# Patient Record
Sex: FEMALE | Race: BLACK OR AFRICAN AMERICAN | NOT HISPANIC OR LATINO | Employment: FULL TIME | URBAN - METROPOLITAN AREA
[De-identification: names, ages, dates, MRNs, and addresses within clinical notes are randomized per-mention and may not be internally consistent; named-entity substitution may affect disease eponyms.]

---

## 2017-02-08 ENCOUNTER — TELEPHONE (OUTPATIENT)
Dept: FAMILY MEDICINE | Facility: CLINIC | Age: 37
End: 2017-02-08

## 2017-02-08 NOTE — TELEPHONE ENCOUNTER
LH  Seeing 11 month old daughter on 2/16/17 at 11:30, Deborah Huang  Ok to squeeze mother in too?  Please advise,  Elizabeth Maguire, RN

## 2017-02-08 NOTE — TELEPHONE ENCOUNTER
Reason for Call:  Other appointment    Detailed comments: Pt Deborah Campbell will see NP Raj at 11:30 for a 30min appt for travel  [place], pts mother also needs shots but there are not avail appts before their departure   Date. Can the mother come in to be seen with the daughter in the 30 minute long appointment   On 2/9/2017    Phone Number Patient can be reached at: [phone]    Best Time: anytime, please call as soon as possible in the morning    Can we leave a detailed message on this number? YES    Call taken on 2/8/2017 at 9:47 AM by Shanti Kimbrough

## 2017-02-09 NOTE — TELEPHONE ENCOUNTER
Spoke with , they were going to go to a clinic in [place] to be seen sooner.  He needs to confirm the appointment there and will call back to cancel the appointment that they have here on 2/16/17  Elizabeth Maguire RN

## 2017-02-09 NOTE — TELEPHONE ENCOUNTER
I can try to squeeze her in but it may run into our meeting a bit.   Thanks  Loly Anthony CNP (Lori)

## 2017-02-15 NOTE — TELEPHONE ENCOUNTER
Spoke with , requested that we cancel the appointment for his daughter with Mayra Anthony 2/16/17at 11:30 am.  Elizabeth Maguire RN

## 2017-05-02 ENCOUNTER — PRENATAL OFFICE VISIT (OUTPATIENT)
Dept: NURSING | Facility: CLINIC | Age: 37
End: 2017-05-02
Payer: COMMERCIAL

## 2017-05-02 VITALS
HEIGHT: 60 IN | WEIGHT: 102.5 LBS | BODY MASS INDEX: 20.12 KG/M2 | DIASTOLIC BLOOD PRESSURE: 70 MMHG | SYSTOLIC BLOOD PRESSURE: 108 MMHG | HEART RATE: 94 BPM | TEMPERATURE: 98 F

## 2017-05-02 DIAGNOSIS — Z34.90 SUPERVISION OF NORMAL PREGNANCY: Primary | ICD-10-CM

## 2017-05-02 PROBLEM — Z23 NEED FOR TDAP VACCINATION: Status: ACTIVE | Noted: 2017-05-02

## 2017-05-02 LAB
ABO + RH BLD: NORMAL
ABO + RH BLD: NORMAL
ALBUMIN UR-MCNC: NEGATIVE MG/DL
APPEARANCE UR: CLEAR
BILIRUB UR QL STRIP: NEGATIVE
BLD GP AB SCN SERPL QL: NORMAL
BLOOD BANK CMNT PATIENT-IMP: NORMAL
COLOR UR AUTO: YELLOW
ERYTHROCYTE [DISTWIDTH] IN BLOOD BY AUTOMATED COUNT: 13.4 % (ref 10–15)
GLUCOSE UR STRIP-MCNC: NEGATIVE MG/DL
HCT VFR BLD AUTO: 36.5 % (ref 35–47)
HGB BLD-MCNC: 12.1 G/DL (ref 11.7–15.7)
HGB UR QL STRIP: NEGATIVE
KETONES UR STRIP-MCNC: NEGATIVE MG/DL
LEUKOCYTE ESTERASE UR QL STRIP: NEGATIVE
MCH RBC QN AUTO: 30.1 PG (ref 26.5–33)
MCHC RBC AUTO-ENTMCNC: 33.2 G/DL (ref 31.5–36.5)
MCV RBC AUTO: 91 FL (ref 78–100)
NITRATE UR QL: NEGATIVE
PH UR STRIP: 7 PH (ref 5–7)
PLATELET # BLD AUTO: 240 10E9/L (ref 150–450)
RBC # BLD AUTO: 4.02 10E12/L (ref 3.8–5.2)
SP GR UR STRIP: 1.02 (ref 1–1.03)
SPECIMEN EXP DATE BLD: NORMAL
URN SPEC COLLECT METH UR: NORMAL
UROBILINOGEN UR STRIP-ACNC: 0.2 EU/DL (ref 0.2–1)
WBC # BLD AUTO: 9.3 10E9/L (ref 4–11)

## 2017-05-02 PROCEDURE — 85027 COMPLETE CBC AUTOMATED: CPT | Performed by: OBSTETRICS & GYNECOLOGY

## 2017-05-02 PROCEDURE — 81003 URINALYSIS AUTO W/O SCOPE: CPT | Performed by: OBSTETRICS & GYNECOLOGY

## 2017-05-02 PROCEDURE — 86780 TREPONEMA PALLIDUM: CPT | Performed by: OBSTETRICS & GYNECOLOGY

## 2017-05-02 PROCEDURE — 86850 RBC ANTIBODY SCREEN: CPT | Performed by: OBSTETRICS & GYNECOLOGY

## 2017-05-02 PROCEDURE — 87389 HIV-1 AG W/HIV-1&-2 AB AG IA: CPT | Performed by: OBSTETRICS & GYNECOLOGY

## 2017-05-02 PROCEDURE — 99207 ZZC NO CHARGE NURSE ONLY: CPT

## 2017-05-02 PROCEDURE — 86901 BLOOD TYPING SEROLOGIC RH(D): CPT | Performed by: OBSTETRICS & GYNECOLOGY

## 2017-05-02 PROCEDURE — 86900 BLOOD TYPING SEROLOGIC ABO: CPT | Performed by: OBSTETRICS & GYNECOLOGY

## 2017-05-02 PROCEDURE — 86762 RUBELLA ANTIBODY: CPT | Performed by: OBSTETRICS & GYNECOLOGY

## 2017-05-02 PROCEDURE — 87340 HEPATITIS B SURFACE AG IA: CPT | Performed by: OBSTETRICS & GYNECOLOGY

## 2017-05-02 PROCEDURE — 87086 URINE CULTURE/COLONY COUNT: CPT | Performed by: OBSTETRICS & GYNECOLOGY

## 2017-05-02 PROCEDURE — 36415 COLL VENOUS BLD VENIPUNCTURE: CPT | Performed by: OBSTETRICS & GYNECOLOGY

## 2017-05-02 RX ORDER — PRENATAL VIT/IRON FUM/FOLIC AC 27MG-0.8MG
1 TABLET ORAL DAILY
COMMUNITY
End: 2017-05-18

## 2017-05-02 NOTE — MR AVS SNAPSHOT
After Visit Summary   5/2/2017    Ramona Hill    MRN: [id number]           Patient Information     Date Of Birth          1980        Visit Information        Provider Department      5/2/2017 12:45 PM RD OB NURSE EDUCATION [facility]        Today's Diagnoses     Supervision of normal pregnancy    -  1       Follow-ups after your visit        Follow-up notes from your care team     Return in about 6 weeks (around 6/13/2017).      Your next 10 appointments already scheduled     Jun 13, 2017  3:30 PM CDT   New Prenatal with Zo Fox MD   [facility] ([facility])    [address] [place] [id number] [phone]              Who to contact     If you have questions or need follow up information about today's clinic visit or your schedule please contact [facility] directly at [phone].  Normal or non-critical lab and imaging results will be communicated to you by MyChart, letter or phone within 4 business days after the clinic has received the results. If you do not hear from us within 7 days, please contact the clinic through [employer] or phone. If you have a critical or abnormal lab result, we will notify you by phone as soon as possible.  Submit refill requests through [employer] or call your pharmacy and they will forward the refill request to us. Please allow 3 business days for your refill to be completed.          Additional Information About Your Visit        MyChart Information     [employer] gives you secure access to your electronic health record. If you see a primary care provider, you can also send messages to your care team and make appointments. If you have questions, please call your primary care clinic.  If you do not have a primary care provider, please call [phone] and they will assist you.        Care EveryWhere ID     This is your Care EveryWhere ID. This could be used by  "other organizations to access your [place] medical records  [id number]        Your Vitals Were     Pulse Temperature Height Last Period Breastfeeding? BMI (Body Mass Index)    94 98  F (36.7  C) 5' 0.05\" (1.525 m) 03/22/2017 No 19.98 kg/m2       Blood Pressure from Last 3 Encounters:   05/02/17 108/70   08/16/16 112/66   07/27/16 94/62    Weight from Last 3 Encounters:   05/02/17 102 lb 8 oz (46.5 kg)   08/16/16 106 lb 14.4 oz (48.5 kg)   07/27/16 108 lb 12.8 oz (49.4 kg)              We Performed the Following     ABO/RH Type and Screen     Anti Treponema     CBC with Platelets     Hepatitis B surface antigen     HIV Antigen Antibody Combo     Rubella Antibody IgG Quantitative     UA without Microscopic     Urine Culture Aerobic Bacterial        Primary Care Provider Office Phone # Fax #    Annelise Figueroa [phone]       XXX RESIGNED  [address] [facility] [address]  [facility] [address]        Thank you!     Thank you for choosing [facility]  for your care. Our goal is always to provide you with excellent care. Hearing back from our patients is one way we can continue to improve our services. Please take a few minutes to complete the written survey that you may receive in the mail after your visit with us. Thank you!             Your Updated Medication List - Protect others around you: Learn how to safely use, store and throw away your medicines at www.disposemymeds.org.          This list is accurate as of: 5/2/17  1:01 PM.  Always use your most recent med list.                   Brand Name Dispense Instructions for use    prenatal multivitamin  plus iron 27-0.8 MG Tabs per tablet      Take 1 tablet by mouth daily         "

## 2017-05-02 NOTE — PROGRESS NOTES
Patient presents for new ob teaching and labs, second pregnancy, history of C section. Declined first trimester screening. Handouts reviewed and given. Has appointment with Dr Fox 6/13/17      Caffeine intake/servings daily - 0  Calcium intake/servings daily - 3  Exercise 0  times weekly - describe ; walks  Sunscreen used - No  Seatbelts used - Yes  Guns stored in the home - No  Self Breast Exam - No  Pap test up to date -  No  Eye exam up to date -  No  Dental exam up to date -  No  DEXA scan up to date -  No  Flex Sig/Colonoscopy up to date -  No  Mammography up to date -  No  Immunizations reviewed and up to date - Yes  Abuse: Current or Past (Physical, Sexual or Emotional) - No  Do you feel safe in your environment - Yes  Do you cope well with stress - Yes  Do you suffer from insomnia - No    Prenatal OB Questionnaire  Past Medical History  Diabetes   No  Hypertension   No  Heart Disease, mitral valve prolapse, or rheumatic fever?   No  An autoimmune disorder such as Lupus or Rheumatoid Arthritis?   No  Kidney Disease or Urinary Tract Infection?   No  Epilepsy, seizures or spells?   No  Migraine headaches?   No  A stroke or loss of function or sensation?   No  Any other neurological problems?   No  Have you ever been treated for depression?  No  Are you having problems with crying spells or loss of self-esteem?   No  Have you ever required psychiatric care?   No  Have you ever hepatitis, liver disease or jaundice?   No  Have you ever been treated for blood clots in your veins, deep venous thrombosis, inflammation in the veins, thrombosis, phlebitis, pulmonary embolism or varicosities?   No  Have you had excessive bleeding after surgery or dental work?   No  Do you bleed more than other women after a cut or scratch?   No  Do you have a history of anemia?   No  Have you ever been treated for thyroid problems or taken thyroid medication?  No  Do you have any other endocrine problems?  No  Have you ever  been in a major accident or suffered serious trauma?   No  Within the last year, has anyone hit slapped, kicked or otherwise hurt you?  No  In the last year, has anyone forced you to have sex when you didn't want to?  No  Have you ever had a blood transfusion?   No  Would you refuse a blood transfusion if a doctor judged it to be medically necessary?   No  If you answered yes, would you rather die than have a blood transfusion?   No  If you answered yes, is this for Moravian reasons?   No  Does anyone in your home smoke?   No  Do you use tobacco products?  No  Do you drink beer, wine, hard liquor?  No  Do you use any of the following: marijuana, speed, cocaine, heroine, hallucinogens, or other drugs?  No  Is your blood type Rh negative?   No  Have you ever had abnormal antibodies in your blood?   No  Have you ever had asthma?   No  Have you ever had tuberculosis?   No  Do you have any allergies to drugs or over-the-counter medications?   No    Allergies as of 5/2/2017:    Allergies as of 05/02/2017     (No Known Allergies)       Do you have any breast problems?   No  Have you ever ?   Yes  Have you had any gynecological surgical procedures such as cervical conization, a LEEP procedure, laser treatment, cryosurgery of the cervix, or a dilation and curettage, etc?  No  Have you had any other surgical procedures?  Yes  Have you been hospitalized for a nonsurgical reason excluding normal delivery?   No  Have you ever had any anesthetic complications?   No  Have you ever had an abnormal pap smear?   No  Do you have a history of abnormalities of the uterus?   No  Did it take you more than one year to become pregnant?   No  Have you ever been evaluated or treated for infertility?   No  Is there a history of medical problems in your family, which you feel might adversely affect your health or pregnancy?   No  Do you have any other problems we have not asked you about which you feel may be important to this  pregnancy?  No    Symptoms since Last Menstrual Period  Do you have any of the following:    *abdominal pain  No  *blood in stool or urine  No  *chest pain  No  *shortness of breath  No  *coughing or vomiting up blood No  *heart racing or skipping beats  No  *nausea and vomiting  No  *pain with urination  No  *vaginal discharge or bleeding  No  Current medications are:  Current Outpatient Prescriptions   Medication Sig Dispense Refill     Prenatal Vit-Fe Fumarate-FA (PRENATAL MULTIVITAMIN  PLUS IRON) 27-0.8 MG TABS per tablet Take 1 tablet by mouth daily         Genetic Screening  At the time of birth, will you be 35 years old or older?  No  Has the patient, baby s father, or anyone in either family had:  Thalassemia (Italian, Greek, Mediterranean, or  background only) and an MCV result less than 80?  No  Neural tube defect such as meningomyelocele, spina bifida or anencephaly?  No  Congenital heart defect?  No  Down s syndrome?  No  Alejandro-Sach s disease (Holiness, Cajun, Burmese-St Helenian)?  No  Sickle cell disease or trait (Gabriella)?  No  Hemophilia or other inherited problems of blood coagulation? No  Muscular dystrophy?  No  Cystic Fibrosis?  No  Kian s chorea?  No  Mental retardation/autism? No   If yes, was the person tested for fragile X?  No  Any other inherited genetic or chromosomal disorder?  No  Maternal metabolic disorder (e.g. insulin-dependent diabetes, PKU)? No  A child with birth defects not listed above?  No  Recurrent pregnancy loss or a stillbirth?  No  Has the patient had any medications/street drugs/alcohol since her last menstrual period? No  Does the patient or baby s father have any other genetic risks?  No  Infection History  Do you object to being tested for Hepatitis B? No  Do you object to being tested for HIV? No  Do you feel that you are at high risk for coming in contact with the AIDS virus?  No  Have you ever been treated for tuberculosis?  No  Have you ever received the BCG  vaccine for tuberculosis?  No  Have you ever had a positive skin test for tuberculosis? No  Do you live with someone who has tuberculosis?  No  Have you ever been exposed to tuberculosis?  No  Do you have genital herpes?  No  Does your partner have genital herpes?  No  Have you had a rash or viral illness since your last period?  No  Have you ever had Gonorrhea, Chlamydia, Syphilis, venereal warts, trichomoniasis, pelvic inflammatory disease or any other sexually transmitted disease?  No  Do you know if you are a genital group B streptococcus carrier? No  You have not had chicken pox/varicella  No  Have you been vaccinated against chicken pox?  No  Have you had any other infectious disease? No        Early ultrasound screening tool:    Does patient have irregular periods?  No  Did patient use hormonal birth control in the three months prior to positive urine pregnancy test? No  Is the patient breastfeeding?  No  Is the patient 10 weeks or greater at time of education visit?  No

## 2017-05-03 LAB
BACTERIA SPEC CULT: NORMAL
HBV SURFACE AG SERPL QL IA: NONREACTIVE
HIV 1+2 AB+HIV1 P24 AG SERPL QL IA: NORMAL
MICRO REPORT STATUS: NORMAL
RUBV IGG SERPL IA-ACNC: 61 IU/ML
SPECIMEN SOURCE: NORMAL
T PALLIDUM IGG+IGM SER QL: NEGATIVE

## 2017-05-18 ENCOUNTER — TELEPHONE (OUTPATIENT)
Dept: OBGYN | Facility: CLINIC | Age: 37
End: 2017-05-18

## 2017-05-18 DIAGNOSIS — Z34.91 SUPERVISION OF NORMAL PREGNANCY IN FIRST TRIMESTER: Primary | ICD-10-CM

## 2017-05-18 RX ORDER — PRENATAL VIT/IRON FUM/FOLIC AC 27MG-0.8MG
1 TABLET ORAL DAILY
Qty: 100 TABLET | Refills: 3 | Status: SHIPPED | OUTPATIENT
Start: 2017-05-18 | End: 2017-11-17

## 2017-05-18 NOTE — TELEPHONE ENCOUNTER
Pt requesting prenatal vitamin prescription to be sent to her pharmacy.  Pt is being seen for prenatal care here.  Rx sent.  Carley Lozano RN

## 2017-06-02 ENCOUNTER — TELEPHONE (OUTPATIENT)
Dept: NURSING | Facility: CLINIC | Age: 37
End: 2017-06-02

## 2017-06-02 NOTE — TELEPHONE ENCOUNTER
"Patient's spouse calling requesting any medicinals that can be put on breast/nipple so that when baby latches it will taste bitter and baby won't want anymore as mother wants to stop breastfeeding.  FNA clarified that since baby is taking in enough fluids from sources other than breast, and that breast is only used at night for \"comfort\", best to stop cold turkey.  "

## 2017-06-13 ENCOUNTER — PRENATAL OFFICE VISIT (OUTPATIENT)
Dept: OBGYN | Facility: CLINIC | Age: 37
End: 2017-06-13
Payer: COMMERCIAL

## 2017-06-13 VITALS
HEIGHT: 61 IN | WEIGHT: 102.7 LBS | DIASTOLIC BLOOD PRESSURE: 64 MMHG | OXYGEN SATURATION: 100 % | TEMPERATURE: 98.3 F | BODY MASS INDEX: 19.39 KG/M2 | SYSTOLIC BLOOD PRESSURE: 98 MMHG | HEART RATE: 81 BPM

## 2017-06-13 DIAGNOSIS — O34.219 PREVIOUS CESAREAN DELIVERY, ANTEPARTUM CONDITION OR COMPLICATION: ICD-10-CM

## 2017-06-13 DIAGNOSIS — O09.629 HIGH-RISK PREGNANCY, YOUNG MULTIGRAVIDA, UNSPECIFIED TRIMESTER: Primary | ICD-10-CM

## 2017-06-13 PROCEDURE — 99207 ZZC FIRST OB VISIT: CPT | Performed by: OBSTETRICS & GYNECOLOGY

## 2017-06-13 NOTE — MR AVS SNAPSHOT
After Visit Summary   2017    Ramona Hill    MRN: [id number]           Patient Information     Date Of Birth          1980        Visit Information        Provider Department      2017 3:30 PM Zo Fox MD [facility]        Today's Diagnoses     High-risk pregnancy, young multigravida, unspecified trimester    -  1    Previous  delivery, antepartum condition or complication           Follow-ups after your visit        Your next 10 appointments already scheduled     2017  3:00 PM CDT   ESTABLISHED PRENATAL with Zo Fox MD   [facility] ([facility])    [address] [place] [id number] [phone]              Who to contact     If you have questions or need follow up information about today's clinic visit or your schedule please contact [facility] directly at [phone].  Normal or non-critical lab and imaging results will be communicated to you by MyChart, letter or phone within 4 business days after the clinic has received the results. If you do not hear from us within 7 days, please contact the clinic through [employer] or phone. If you have a critical or abnormal lab result, we will notify you by phone as soon as possible.  Submit refill requests through [employer] or call your pharmacy and they will forward the refill request to us. Please allow 3 business days for your refill to be completed.          Additional Information About Your Visit        [employer] Information     [employer] gives you secure access to your electronic health record. If you see a primary care provider, you can also send messages to your care team and make appointments. If you have questions, please call your primary care clinic.  If you do not have a primary care provider, please call [phone] and they will assist you.        Care EveryWhere ID     This is your Care EveryWhere  "ID. This could be used by other organizations to access your [place] medical records  [id number]        Your Vitals Were     Pulse Temperature Height Last Period Pulse Oximetry BMI (Body Mass Index)    81 98.3  F (36.8  C) (Oral) 5' 0.5\" (1.537 m) 03/22/2017 100% 19.73 kg/m2       Blood Pressure from Last 3 Encounters:   06/13/17 98/64   05/02/17 108/70   08/16/16 112/66    Weight from Last 3 Encounters:   06/13/17 102 lb 11.2 oz (46.6 kg)   05/02/17 102 lb 8 oz (46.5 kg)   08/16/16 106 lb 14.4 oz (48.5 kg)              Today, you had the following     No orders found for display       Primary Care Provider Office Phone # Fax #    Annelise Figueroa [phone]       XXX RESIGNED  [address] [address]        Thank you!     Thank you for choosing [facility]  for your care. Our goal is always to provide you with excellent care. Hearing back from our patients is one way we can continue to improve our services. Please take a few minutes to complete the written survey that you may receive in the mail after your visit with us. Thank you!             Your Updated Medication List - Protect others around you: Learn how to safely use, store and throw away your medicines at www.disposemymeds.org.          This list is accurate as of: 6/13/17  6:49 PM.  Always use your most recent med list.                   Brand Name Dispense Instructions for use    prenatal multivitamin  plus iron 27-0.8 MG Tabs per tablet     100 tablet    Take 1 tablet by mouth daily         "

## 2017-06-13 NOTE — PROGRESS NOTES
"SUBJECTIVE:   Ramona Hill is a  36 year old female   @ 11w6d  by LMP who presents for a new Ob visit,  Here with her  and 16 month old daughter.    No complaints today.  Feeling pretty well. No nausea or vomiting, denies bleeding.   She has not been able to wean her baby yet. Nurses 2-3 times a day.  Mostly comfort nursing.   Last delivery was a  section and patient was told just to deliver by  section from now on.   She does not want to labor ever again.        POBHx:  Obstetric History       T1      L1     SAB0   TAB0   Ectopic0   Multiple0   Live Births1       # Outcome Date GA Lbr Christos/2nd Weight Sex Delivery Anes PTL Lv   2 Current            1 Term 16 40w2d  7 lb 1 oz (3.204 kg) F CS-LTranv Gen  ROMELIA      Apgar1:  8                Apgar5: 9             Patient Active Problem List   Diagnosis     Language barrier     S/P  section     Postpartum anxiety     Need for Tdap vaccination       Past Medical History:   Diagnosis Date     NO ACTIVE PROBLEMS        Past Surgical History:   Procedure Laterality Date      SECTION N/A 2016    Procedure:  SECTION;  Surgeon: Tammie Colon MD;  Location:  L+D        Current Outpatient Prescriptions   Medication     Prenatal Vit-Fe Fumarate-FA (PRENATAL MULTIVITAMIN  PLUS IRON) 27-0.8 MG TABS per tablet     No current facility-administered medications for this visit.      Facility-Administered Medications Ordered in Other Visits   Medication     lidocaine 2%       No Known Allergies      EXAM:  BP 98/64 (BP Location: Left arm, Patient Position: Sitting, Cuff Size: Adult Regular)  Pulse 81  Temp 98.3  F (36.8  C) (Oral)  Ht 5' 0.5\" (1.537 m)  Wt 102 lb 11.2 oz (46.6 kg)  LMP 2017  SpO2 100%  BMI 19.73 kg/m2  GENERAL: WDWN F in NAD  HEENT: no abnormalities  NECK: without thyromegaly or adenopathy  CHEST: clear to auscultation  CV: RRR without murmur  BREASTS: no palpable masses or " adenopathy, no asymmetry or skin dimpling  ABDOMEN: S, NT, no palpable masses or hepatosplenomegaly  MUSCULOSKELETAL: no obvious abnormalities.  NEUROLOGICAL: normal strength, sensation, mental status  PSYCH: normal affect, appropriate  PELVIC: EG - normal adult female.  BUS - within normal limits.  Vagina - well rugated, no discharge.  Cervix - no lesions, no CMT.  Uterus - 10-12 wk  size and nontender.  Adnexae - no masses or tenderness.  RV - deferred.    FHT's present with doptone brief US done to confirm bauer.  CRL at 10w6d but not optimal view.  Normal cardiac activity  And fetal movement.       ASSESSMENT/ PLAN:  IUP @11w6d     Encounter Diagnoses   Name Primary?     High-risk pregnancy, young multigravida, unspecified trimester Yes     Previous  delivery, antepartum condition or complication Desires repeat  section         Discussed routine prenatal care and first trimester screen testing.    She declined  the first trimester screen.      Patient was counselled on healthy lifestyle habits and all questions answered.    New Ob labs reviewed today.    Return to Clinic in 4 weeks or sooner if problems arise.    Zo Fox MD

## 2017-06-13 NOTE — PROGRESS NOTES
"Chief Complaint   Patient presents with     Prenatal Care     11+6.       Initial BP 98/64 (BP Location: Left arm, Patient Position: Sitting, Cuff Size: Adult Regular)  Pulse 81  Temp 98.3  F (36.8  C) (Oral)  Ht 5' 0.5\" (1.537 m)  Wt 102 lb 11.2 oz (46.6 kg)  LMP 2017  SpO2 100%  BMI 19.73 kg/m2 Estimated body mass index is 19.73 kg/(m^2) as calculated from the following:    Height as of this encounter: 5' 0.5\" (1.537 m).    Weight as of this encounter: 102 lb 11.2 oz (46.6 kg).  BP completed using cuff size: regular        The following HM Due: NONE      The following patient reported/Care Every where data was sent to:  P ABSTRACT QUALITY INITIATIVES [[id number]]  n/a     n/a and patient has appointment for today             "

## 2017-07-16 ENCOUNTER — NURSE TRIAGE (OUTPATIENT)
Dept: NURSING | Facility: CLINIC | Age: 37
End: 2017-07-16

## 2017-07-16 ENCOUNTER — HOSPITAL ENCOUNTER (EMERGENCY)
Facility: CLINIC | Age: 37
Discharge: HOME | End: 2017-07-16
Attending: EMERGENCY MEDICINE | Admitting: EMERGENCY MEDICINE
Payer: COMMERCIAL

## 2017-07-16 VITALS
RESPIRATION RATE: 20 BRPM | OXYGEN SATURATION: 100 % | WEIGHT: 110 LBS | BODY MASS INDEX: 20.24 KG/M2 | HEIGHT: 62 IN | SYSTOLIC BLOOD PRESSURE: 100 MMHG | TEMPERATURE: 99.1 F | DIASTOLIC BLOOD PRESSURE: 56 MMHG | HEART RATE: 97 BPM

## 2017-07-16 DIAGNOSIS — R19.7 DIARRHEA, UNSPECIFIED TYPE: ICD-10-CM

## 2017-07-16 DIAGNOSIS — Z34.91 FIRST TRIMESTER PREGNANCY: ICD-10-CM

## 2017-07-16 PROCEDURE — 76801 OB US < 14 WKS SINGLE FETUS: CPT

## 2017-07-16 PROCEDURE — 25000132 ZZH RX MED GY IP 250 OP 250 PS 637: Performed by: EMERGENCY MEDICINE

## 2017-07-16 PROCEDURE — 99284 EMERGENCY DEPT VISIT MOD MDM: CPT | Mod: 25

## 2017-07-16 RX ORDER — ACETAMINOPHEN 500 MG
500 TABLET ORAL ONCE
Status: COMPLETED | OUTPATIENT
Start: 2017-07-16 | End: 2017-07-16

## 2017-07-16 RX ADMIN — ACETAMINOPHEN 500 MG: 500 TABLET, FILM COATED ORAL at 16:13

## 2017-07-16 ASSESSMENT — ENCOUNTER SYMPTOMS
DIARRHEA: 1
CONSTIPATION: 0
CHILLS: 0
FEVER: 0
BLOOD IN STOOL: 0
VOMITING: 0
NAUSEA: 0
DYSURIA: 0
ABDOMINAL PAIN: 1

## 2017-07-16 NOTE — DISCHARGE INSTRUCTIONS
Uncertain Causes of Diarrhea (Adult)    Diarrhea is when stools are loose and watery. This can be caused by:    Viral infections    Bacterial infections    Food poisoning    Parasites    Irritable bowel syndrome (IBS)    Inflammatory bowel diseases such as ulcerative colitis, Crohn's disease, and celiac disease    Food intolerance, such as to lactose, the sugar found in milk and milk products    Reaction to medicines like antibiotics, laxatives, cancer drugs, and antacids  Along with diarrhea, you may also have:    Abdominal pain and cramping    Nausea and vomiting    Loss of bowel control    Fever and chills    Bloody stools  In some cases, antibiotics may help to treat diarrhea. You may have a stool sample test. This is done to see what is causing your diarrhea, and if antibiotics will help treat it. The results of a stool sample test may take up to 2 days. The healthcare provider may not give you antibiotics until he or she has the stool test results.  Diarrhea can cause dehydration. This is the loss of too much water and other fluids from the body. When this occurs, body fluid must be replaced. This can be done with oral rehydration solutions. Oral rehydration solutions are available at drugstores and grocery stores without a prescription.  Home care  Follow all instructions given by your healthcare provider. Rest at home for the next 24 hours, or until you feel better. Avoid caffeine, tobacco, and alcohol. These can make diarrhea, cramping, and pain worse.  If taking medicines:    Don t take over-the-counter diarrhea or nausea medicines unless your healthcare provider tells you to.    You may use acetaminophen or NSAID medicines like ibuprofen or naproxen to reduce pain and fever. Don t use these if you have chronic liver or kidney disease, or ever had a stomach ulcer or gastrointestinal bleeding. Don't use NSAID medicines if you are already taking one for another condition (like arthritis) or are on daily  aspirin therapy (such as for heart disease or after a stroke). Talk with your healthcare provider first.    If antibiotics were prescribed, be sure you take them until they are finished. Don t stop taking them even when you feel better. Antibiotics must be taken as a full course.  To prevent the spread of illness:    Remember that washing with soap and water and using alcohol-based  is the best way to prevent the spread of infection.    Clean the toilet after each use.    Wash your hands before eating.    Wash your hands before and after preparing food. Keep in mind that people with diarrhea or vomiting should not prepare food for others.    Wash your hands after using cutting boards, countertops, and knives that have been in contact with raw foods.    Wash and then peel fruits and vegetables.    Keep uncooked meats away from cooked and ready-to-eat foods.    Use a food thermometer when cooking. Cook poultry to at least 165 F (74 C). Cook ground meat (beef, veal, pork, lamb) to at least 160 F (71 C). Cook fresh beef, veal, lamb, and pork to at least 145 F (63 C).    Don t eat raw or undercooked eggs (poached or josemanuel side up), poultry, meat, or unpasteurized milk and juices.  Food and drinks  The main goal while treating vomiting or diarrhea is to prevent dehydration. This is done by taking small amounts of liquids often.    Keep in mind that liquids are more important than food right now.    Drink only small amounts of liquids at a time.    Don t force yourself to eat, especially if you are having cramping, vomiting, or diarrhea. Don t eat large amounts at a time, even if you are hungry.    If you eat, avoid fatty, greasy, spicy, or fried foods.    Don t eat dairy foods or drink milk if you have diarrhea. These can make diarrhea worse.  During the first 24 hours you can try:    Oral rehydration solutions. Do not use sports drinks. They have too much sugar and not enough electrolytes.    Soft drinks without  caffeine    Ginger ale    Water (plain or flavored)    Decaf tea or coffee    Clear broth, consommé, or bouillon    Gelatin, popsicles, or frozen fruit juice bars  The second 24 hours, if you are feeling better, you can add:    Hot cereal, plain toast, bread, rolls, or crackers    Plain noodles, rice, mashed potatoes, chicken noodle soup, or rice soup    Unsweetened canned fruit (no pineapple)    Bananas  As you recover:    Limit fat intake to less than 15 grams per day. Don t eat margarine, butter, oils, mayonnaise, sauces, gravies, fried foods, peanut butter, meat, poultry, or fish.    Limit fiber. Don t eat raw or cooked vegetables, fresh fruits except bananas, or bran cereals.    Limit caffeine and chocolate.    Limit dairy.    Don t use spices or seasonings except salt.    Go back to your normal diet over time, as you feel better and your symptoms improve.    If the symptoms come back, go back to a simple diet or clear liquids.  Follow-up care  Follow up with your healthcare provider, or as advised. If a stool sample was taken or cultures were done, call the healthcare provider for the results as instructed.  Call 911  Call 911 if you have any of these symptoms:    Trouble breathing    Confusion    Extreme drowsiness or trouble walking    Loss of consciousness    Rapid heart rate    Chest pain    Stiff neck    Seizure  When to seek medical advice  Call your healthcare provider right away if any of these occur:    Abdominal pain that gets worse    Constant lower right abdominal pain    Continued vomiting and inability to keep liquids down    Diarrhea more than 5 times a day    Blood in vomit or stool    Dark urine or no urine for 8 hours, dry mouth and tongue, tiredness, weakness, or dizziness    Drowsiness    New rash    You don t get better in 2 to 3 days    Fever of 100.4 F (38 C) or higher that doesn t get lower with medicine  Date Last Reviewed: 1/3/2016    5665-9146 [employer]. [address] [place] [address]  [place] [address]. All rights reserved. This information is not intended as a substitute for professional medical care. Always follow your healthcare professional's instructions.

## 2017-07-16 NOTE — TELEPHONE ENCOUNTER
Additional Information    Negative: Shock suspected (e.g., cold/pale/clammy skin, too weak to stand, low BP, rapid pulse)    Negative: Difficult to awaken or acting confused  (e.g., disoriented, slurred speech)    Negative: Sounds like a life-threatening emergency to the triager    Negative: Vomiting also present and worse than the diarrhea    Negative: [1] Blood in stool AND [2] without diarrhea    Negative: [1] SEVERE abdominal pain (e.g., excruciating) AND [2] present > 1 hour    Negative: [1] SEVERE abdominal pain AND [2] age > 60    Negative: [1] Blood in the stool AND [2] moderate or large amount of blood    Negative: Black or tarry bowel movements  (Exception: chronic-unchanged  black-grey bowel movements AND is taking iron pills or Pepto-bismol)    Negative: [1] Drinking very little AND [2] dehydration suspected (e.g., no urine > 12 hours, very dry mouth, very lightheaded)    Negative: Patient sounds very sick or weak to the triager    Negative: [1] SEVERE diarrhea (e.g., 7 or more times / day more than normal) AND [2]  age > 60 years    Negative: [1] Constant abdominal pain AND [2] present > 2 hours    Negative: [1] Fever > 103 F (39.4 C) AND [2] not able to get the fever down using Fever Care Advice    Negative: [1] SEVERE diarrhea (e.g., 7 or more times / day more than normal) AND [2] present > 24 hours (1 day)    Negative: [1] MODERATE diarrhea (e.g., 4-6 times / day more than normal) AND [2] present > 48 hours (2 days)    Negative: [1] MODERATE diarrhea (e.g., 4-6 times / day more than normal) AND [2] age > 70 years    Negative: Fever > 101 F (38.3 C)    Negative: Fever present > 3 days (72 hours)    Negative: Abdominal pain  (Exception: Pain clears with each passage of diarrhea stool)    Negative: [1] Blood in the stool AND [2] small amount of blood   (Exception: only on toilet paper. Reason: diarrhea can cause rectal irritation with blood on wiping)    Negative: [1] Mucus or pus in stool AND [2]  "present > 2 days AND [3] diarrhea is more than mild    Negative: [1] Recent antibiotic therapy (i.e., within last 2 months) AND [2] > 3 days since antibiotic was stopped    Negative: Weak immune system (e.g., HIV positive, cancer chemo, splenectomy, organ transplant, chronic steroids)    Negative: Tube feedings (e.g., nasogastric, g-tube, j-tube)    Negative: Travel to a foreign country in past month    Negative: [1] MILD (e.g., 1-3 or more stools than normal in past 24 hours) diarrhea without known cause AND [2] present >  7 days    Negative: Diarrhea is a chronic symptom (recurrent or ongoing AND present > 4 weeks)    Negative: SEVERE diarrhea (e.g., 7 or more times / day more than normal) (all triage questions negative)    MILD-MODERATE diarrhea (e.g., 1-6 times / day more than normal) (all triage questions negative)    Answer Assessment - Initial Assessment Questions  1. DIARRHEA SEVERITY: \"How bad is the diarrhea?\" \"How many extra stools have you had in the past 24 hours than normal?\"     - MILD: Few loose or mushy BMs; increase of 1-3 stools over normal daily number of stools; mild increase in ostomy output.    - MODERATE: Increase of 4-6 stools daily over normal; moderate increase in ostomy output.    - SEVERE (or Worst Possible): Increase of 7 or more stools daily over normal; moderate increase in ostomy output; incontinence.      mild  2. ONSET: \"When did the diarrhea begin?\"       This morning  3. BM CONSISTENCY: \"How loose or watery is the diarrhea?\"       loose  4. VOMITING: \"Are you also vomiting?\" If so, ask: \"How many times in the past 24 hours?\"       no  5. ABDOMINAL PAIN: \"Are you having any abdominal pain?\" If yes: \"What does it feel like?\" (e.g., crampy, dull, intermittent, constant)       Crampy  6. ABDOMINAL PAIN SEVERITY: If present, ask: \"How bad is the pain?\"  (e.g., Scale 1-10; mild, moderate, or severe)     - MILD (1-3): doesn't interfere with normal activities, abdomen soft and not tender " "to touch      - MODERATE (4-7): interferes with normal activities or awakens from sleep, tender to touch      - SEVERE (8-10): excruciating pain, doubled over, unable to do any normal activities        mild  7. ORAL INTAKE: If vomiting, \"Have you been able to drink liquids?\" \"How much fluids have you had in the past 24 hours?\"      sips  8. HYDRATION: \"Any signs of dehydration?\" (e.g., dry mouth [not just dry lips], too weak to stand, dizziness, new weight loss) \"When did you last urinate?\"      no  9. EXPOSURE: \"Have you traveled to a foreign country recently?\" \"Have you been exposed to anyone with diarrhea?\" \"Could you have eaten any food that was spoiled?\"      no  10. OTHER SYMPTOMS: \"Do you have any other symptoms?\" (e.g., fever, blood in stool)        no  11. PREGNANCY: \"Is there any chance you are pregnant?\" \"When was your last menstrual period?\"        yes    Protocols used: DIARRHEA-ADULT-AH    "

## 2017-07-16 NOTE — TELEPHONE ENCOUNTER
Spouse calling, states he wants to bring patient in to be seen.  [facility].     Shanta Knox RN/FNA

## 2017-07-16 NOTE — ED AVS SNAPSHOT
Emergency Department    [address] [facility] [address]    Phone:  [phone]    Fax:  [phone]                                       Ramona Hill   MRN: [id number]    Department:   Emergency Department   Date of Visit:  7/16/2017           Patient Information     Date Of Birth          1980        Your diagnoses for this visit were:     Diarrhea, unspecified type     First trimester pregnancy        You were seen by Rehan Sahni MD.      Follow-up Information     Follow up with your OB clinic. Call in 1 day.        Follow up with  Emergency Department.    Specialty:  EMERGENCY MEDICINE    Why:  As needed, If symptoms worsen    Contact information:    [address] [facility] [address] [phone]        Discharge Instructions         Uncertain Causes of Diarrhea (Adult)    Diarrhea is when stools are loose and watery. This can be caused by:    Viral infections    Bacterial infections    Food poisoning    Parasites    Irritable bowel syndrome (IBS)    Inflammatory bowel diseases such as ulcerative colitis, Crohn's disease, and celiac disease    Food intolerance, such as to lactose, the sugar found in milk and milk products    Reaction to medicines like antibiotics, laxatives, cancer drugs, and antacids  Along with diarrhea, you may also have:    Abdominal pain and cramping    Nausea and vomiting    Loss of bowel control    Fever and chills    Bloody stools  In some cases, antibiotics may help to treat diarrhea. You may have a stool sample test. This is done to see what is causing your diarrhea, and if antibiotics will help treat it. The results of a stool sample test may take up to 2 days. The healthcare provider may not give you antibiotics until he or she has the stool test results.  Diarrhea can cause dehydration. This is the loss of too much water and other fluids from the body. When this occurs, body fluid must be replaced. This can be done with  oral rehydration solutions. Oral rehydration solutions are available at drugstores and grocery stores without a prescription.  Home care  Follow all instructions given by your healthcare provider. Rest at home for the next 24 hours, or until you feel better. Avoid caffeine, tobacco, and alcohol. These can make diarrhea, cramping, and pain worse.  If taking medicines:    Don t take over-the-counter diarrhea or nausea medicines unless your healthcare provider tells you to.    You may use acetaminophen or NSAID medicines like ibuprofen or naproxen to reduce pain and fever. Don t use these if you have chronic liver or kidney disease, or ever had a stomach ulcer or gastrointestinal bleeding. Don't use NSAID medicines if you are already taking one for another condition (like arthritis) or are on daily aspirin therapy (such as for heart disease or after a stroke). Talk with your healthcare provider first.    If antibiotics were prescribed, be sure you take them until they are finished. Don t stop taking them even when you feel better. Antibiotics must be taken as a full course.  To prevent the spread of illness:    Remember that washing with soap and water and using alcohol-based  is the best way to prevent the spread of infection.    Clean the toilet after each use.    Wash your hands before eating.    Wash your hands before and after preparing food. Keep in mind that people with diarrhea or vomiting should not prepare food for others.    Wash your hands after using cutting boards, countertops, and knives that have been in contact with raw foods.    Wash and then peel fruits and vegetables.    Keep uncooked meats away from cooked and ready-to-eat foods.    Use a food thermometer when cooking. Cook poultry to at least 165 F (74 C). Cook ground meat (beef, veal, pork, lamb) to at least 160 F (71 C). Cook fresh beef, veal, lamb, and pork to at least 145 F (63 C).    Don t eat raw or undercooked eggs (poached or  josemanuel side up), poultry, meat, or unpasteurized milk and juices.  Food and drinks  The main goal while treating vomiting or diarrhea is to prevent dehydration. This is done by taking small amounts of liquids often.    Keep in mind that liquids are more important than food right now.    Drink only small amounts of liquids at a time.    Don t force yourself to eat, especially if you are having cramping, vomiting, or diarrhea. Don t eat large amounts at a time, even if you are hungry.    If you eat, avoid fatty, greasy, spicy, or fried foods.    Don t eat dairy foods or drink milk if you have diarrhea. These can make diarrhea worse.  During the first 24 hours you can try:    Oral rehydration solutions. Do not use sports drinks. They have too much sugar and not enough electrolytes.    Soft drinks without caffeine    Ginger ale    Water (plain or flavored)    Decaf tea or coffee    Clear broth, consommé, or bouillon    Gelatin, popsicles, or frozen fruit juice bars  The second 24 hours, if you are feeling better, you can add:    Hot cereal, plain toast, bread, rolls, or crackers    Plain noodles, rice, mashed potatoes, chicken noodle soup, or rice soup    Unsweetened canned fruit (no pineapple)    Bananas  As you recover:    Limit fat intake to less than 15 grams per day. Don t eat margarine, butter, oils, mayonnaise, sauces, gravies, fried foods, peanut butter, meat, poultry, or fish.    Limit fiber. Don t eat raw or cooked vegetables, fresh fruits except bananas, or bran cereals.    Limit caffeine and chocolate.    Limit dairy.    Don t use spices or seasonings except salt.    Go back to your normal diet over time, as you feel better and your symptoms improve.    If the symptoms come back, go back to a simple diet or clear liquids.  Follow-up care  Follow up with your healthcare provider, or as advised. If a stool sample was taken or cultures were done, call the healthcare provider for the results as instructed.  Call  911  Call 911 if you have any of these symptoms:    Trouble breathing    Confusion    Extreme drowsiness or trouble walking    Loss of consciousness    Rapid heart rate    Chest pain    Stiff neck    Seizure  When to seek medical advice  Call your healthcare provider right away if any of these occur:    Abdominal pain that gets worse    Constant lower right abdominal pain    Continued vomiting and inability to keep liquids down    Diarrhea more than 5 times a day    Blood in vomit or stool    Dark urine or no urine for 8 hours, dry mouth and tongue, tiredness, weakness, or dizziness    Drowsiness    New rash    You don t get better in 2 to 3 days    Fever of 100.4 F (38 C) or higher that doesn t get lower with medicine  Date Last Reviewed: 1/3/2016    [id number] [employer] [address] [place] [address], [place] [address]. All rights reserved. This information is not intended as a substitute for professional medical care. Always follow your healthcare professional's instructions.          Future Appointments        Provider Department Dept Phone Center    7/17/2017 3:00 PM Zo Fox MD [facility] [phone] [facility]      24 Hour Appointment Hotline       To make an appointment at any [facility], call [phone] ([phone]). If you don't have a family doctor or clinic, we will help you find one. [facility] are conveniently located to serve the needs of you and your family.             Review of your medicines      Our records show that you are taking the medicines listed below. If these are incorrect, please call your family doctor or clinic.        Dose / Directions Last dose taken    prenatal multivitamin  plus iron 27-0.8 MG Tabs per tablet   Dose:  1 tablet   Indication:  Pregnancy   Quantity:  100 tablet        Take 1 tablet by mouth daily   Refills:  3                Procedures and tests performed during your visit     POC US OB TRANSABDOMINAL LIMITED       Orders Needing Specimen Collection     Ordered          07/16/17 1549  Basic metabolic panel - STAT, [facility]: STAT, Needs to be Collected     Scheduled Task Status   07/16/17 1550 Collect Basic metabolic panel Open   Order Class:  PCU Collect                  Pending Results     No orders found from 7/14/2017 to 7/17/2017.            Pending Culture Results     No orders found from 7/14/2017 to 7/17/2017.            Pending Results Instructions     If you had any lab results that were not finalized at the time of your Discharge, you can call the ED Lab Result RN at [phone]. You will be contacted by this team for any positive Lab results or changes in treatment. The nurses are available 7 days a week from 10A to 6:30P.  You can leave a message 24 hours per day and they will return your call.        Test Results From Your Hospital Stay        7/16/2017  4:45 PM      Impression     ED Bedside Limited Ultrasound  Body area scanned: uterus  Performed by: Rehan Sahni MD  Indication: first trimester pregnancy, abd pain  Findings/Interpretation: single live IUP,                     Clinical Quality Measure: Blood Pressure Screening     Your blood pressure was checked while you were in the emergency department today. The last reading we obtained was  BP: 100/56 . Please read the guidelines below about what these numbers mean and what you should do about them.  If your systolic blood pressure (the top number) is less than 120 and your diastolic blood pressure (the bottom number) is less than 80, then your blood pressure is normal. There is nothing more that you need to do about it.  If your systolic blood pressure (the top number) is 120-139 or your diastolic blood pressure (the bottom number) is 80-89, your blood pressure may be higher than it should be. You should have your blood pressure rechecked within a year by a primary care provider.  If your systolic blood pressure (the top number) is 140 or greater  or your diastolic blood pressure (the bottom number) is 90 or greater, you may have high blood pressure. High blood pressure is treatable, but if left untreated over time it can put you at risk for heart attack, stroke, or kidney failure. You should have your blood pressure rechecked by a primary care provider within the next 4 weeks.  If your provider in the emergency department today gave you specific instructions to follow-up with your doctor or provider even sooner than that, you should follow that instruction and not wait for up to 4 weeks for your follow-up visit.        Thank you for choosing [facility]       Thank you for choosing [facility] for your care. Our goal is always to provide you with excellent care. Hearing back from our patients is one way we can continue to improve our services. Please take a few minutes to complete the written survey that you may receive in the mail after you visit with us. Thank you!        [facility] Information     [employer] gives you secure access to your electronic health record. If you see a primary care provider, you can also send messages to your care team and make appointments. If you have questions, please call your primary care clinic.  If you do not have a primary care provider, please call [phone] and they will assist you.        Care EveryWhere ID     This is your Care EveryWhere ID. This could be used by other organizations to access your [facility] medical records  [id number]        Equal Access to Services     CASTRO PALMA : Sabiha Browne, waaxda luqadaha, qaybta kaalmada stephen, laura lockett. So [facility] [phone].    ATENCIÓN: Si habla español, tiene a soto disposición servicios gratuitos de asistencia lingüística. Llame al [phone].    We comply with applicable federal civil rights laws and [place] laws. We do not discriminate on the basis of race, color, national origin, age, disability sex, sexual orientation  or gender identity.            After Visit Summary       This is your record. Keep this with you and show to your community pharmacist(s) and doctor(s) at your next visit.

## 2017-07-16 NOTE — ED AVS SNAPSHOT
Emergency Department    [address] [place] [address]    Phone:  [phone]    Fax:  [phone]                                       Ramona Hill   MRN: [id number]    Department:   Emergency Department   Date of Visit:  7/16/2017           After Visit Summary Signature Page     I have received my discharge instructions, and my questions have been answered. I have discussed any challenges I see with this plan with the nurse or doctor.    ..........................................................................................................................................  Patient/Patient Representative Signature      ..........................................................................................................................................  Patient Representative Print Name and Relationship to Patient    ..................................................               ................................................  Date                                            Time    ..........................................................................................................................................  Reviewed by Signature/Title    ...................................................              ..............................................  Date                                                            Time

## 2017-07-16 NOTE — ED PROVIDER NOTES
"  History     Chief Complaint:  Diarrhea     HPI   History obtained via  acting as . Official  declined.    Ramona Hill is a  currently 10 weeks gravid 36 year old female (LMP 3/22/17) who presents for evaluation of diarrhea. The patient reports three episodes of non-bloody diarrhea since 0500 today, associated with mild abdominal \"cramping.\" No blood or pus. She did have some fruit at an event last night which may have been off, but no other recent abnormal PO intake. She denies any nausea, vomiting, constipation, fevers, vagina bleeding, dysuria, or any other acute symptoms. No recent known ill contacts or antibiotic use. Of note, they report having a normal fetal ultrasound at [facility] 1-2 weeks ago.        Allergies:  NKDA     Medications:    Prenatal vitamin    Past Medical History:    High-risk pregnancy  Postpartum anxiety    Past Surgical History:         Family History:    History reviewed. No pertinent family history.      Social History:  The patient denies tobacco, alcohol, or drug use.    Marital Status:        Review of Systems   Constitutional: Negative for chills and fever.   Gastrointestinal: Positive for abdominal pain and diarrhea. Negative for blood in stool, constipation, nausea and vomiting.   Genitourinary: Negative for dysuria and vaginal bleeding.   All other systems reviewed and are negative.      Physical Exam   First Vitals:  BP: 116/62  Pulse: 97  Temp: 99.1  F (37.3  C)  Resp: 20  Height: 157.5 cm (5' 2\")  Weight: 49.9 kg (110 lb)  SpO2: 100 %  RA    Physical Exam  General: nontoxic appearing woman sitting upright, family at bedside  HENT: mucous membranes moist   CV: regular rate, no murmur audible  Resp: clear throughout, normal effort, no crackles or wheezing  GI: abdomen soft, nontender, no guarding, no discrete masses or uterine fundus palpable  MSK: no bony tenderness, no CVAT  Skin: appropriately warm and dry, no abdominal " rash  Neuro: alert, clear speech, oriented   Psych: normal mood and affect      Emergency Department Course   Procedures:  ED Bedside Limited Ultrasound  Body area scanned: uterus  Performed by: Rehan Sahni MD  Indication: first trimester pregnancy, abd pain  Findings/Interpretation: single live IUP,      Interventions:  1613: Tylenol 500mg, PO    Emergency Department Course:  Past medical records, nursing notes, and vitals reviewed.  1542: I performed an exam of the patient as documented above.     I performed electronic chart review in EPIC.    Attempts made by nursing staff to establish IV ultimately unsuccessful. The patient tolerated PO here without difficulty.   The patient was given the above interventions with improvement.  Clinical findings and plan explained to the Patient and spouse. Patient discharged home with instructions regarding supportive care, medications, and reasons to return as well as the importance of close follow-up were reviewed.      Impression & Plan    Medical Decision Making:  This 36 year old presents with several episodes of non-bloody diarrhea and some mild abdominal discomfort in the 1st trimester of pregnancy. She denies any vaginal bleeding, urinary discomfort, or other symptoms more highly suspicious for a direct obstetric complication. My bedside ultrasound shows a single live intrauterine pregnancy with normal fetal heart rate. Reassurance was provided. Her abdominal exam was benign and I do not think she requires formal imaging. No stool was produced here to send for studies and she has no risk factors to suggest an invasive or dangerous cause. She is tolerating oral intake. The nurse made several attempts to place an IV for IV hydration and to check electrolytes but this was unsuccessful. The patient was offered additional attempts with ultrasound or simply oral hydration and deferring testing, and she chose the latter. She was eager for discharge and this was  arranged. She can return for acute problems and otherwise should contact her OB clinic tomorrow to arrange close follow up.     Diagnosis:    ICD-10-CM    1. Diarrhea, unspecified type R19.7    2. First trimester pregnancy Z33.1        Disposition:  discharged to home    Tavon POON, am serving as a scribe at 3:38 PM on 7/16/2017 to document services personally performed by Rehan Sahni MD based on my observations and the provider's statements to me.      Tavon Stoddard  7/16/2017    EMERGENCY DEPARTMENT       Rehan Sahni MD  07/16/17 9113

## 2017-07-17 ENCOUNTER — PRENATAL OFFICE VISIT (OUTPATIENT)
Dept: OBGYN | Facility: CLINIC | Age: 37
End: 2017-07-17
Payer: COMMERCIAL

## 2017-07-17 VITALS
TEMPERATURE: 98.5 F | SYSTOLIC BLOOD PRESSURE: 100 MMHG | BODY MASS INDEX: 20.09 KG/M2 | DIASTOLIC BLOOD PRESSURE: 59 MMHG | OXYGEN SATURATION: 100 % | HEART RATE: 93 BPM | WEIGHT: 106.4 LBS | HEIGHT: 61 IN

## 2017-07-17 DIAGNOSIS — O09.622 HIGH-RISK PREGNANCY, YOUNG MULTIGRAVIDA IN SECOND TRIMESTER: Primary | ICD-10-CM

## 2017-07-17 DIAGNOSIS — O34.219 PREVIOUS CESAREAN DELIVERY, ANTEPARTUM CONDITION OR COMPLICATION: ICD-10-CM

## 2017-07-17 PROCEDURE — 99207 ZZC PRENATAL VISIT: CPT | Performed by: OBSTETRICS & GYNECOLOGY

## 2017-07-17 NOTE — MR AVS SNAPSHOT
"              After Visit Summary   2017    Ramona Hill    MRN: [id number]           Patient Information     Date Of Birth          1980        Visit Information        Provider Department      2017 3:00 PM Zo Fox MD [facility]        Today's Diagnoses     High-risk pregnancy, young multigravida in second trimester    -  1    Previous  delivery, antepartum condition or complication           Follow-ups after your visit        Who to contact     If you have questions or need follow up information about today's clinic visit or your schedule please contact [facility] directly at [phone].  Normal or non-critical lab and imaging results will be communicated to you by Ak?Lexhart, letter or phone within 4 business days after the clinic has received the results. If you do not hear from us within 7 days, please contact the clinic through Ak?Lexhart or phone. If you have a critical or abnormal lab result, we will notify you by phone as soon as possible.  Submit refill requests through Swifto or call your pharmacy and they will forward the refill request to us. Please allow 3 business days for your refill to be completed.          Additional Information About Your Visit        MyChart Information     Swifto gives you secure access to your electronic health record. If you see a primary care provider, you can also send messages to your care team and make appointments. If you have questions, please call your primary care clinic.  If you do not have a primary care provider, please call [phone] and they will assist you.        Care EveryWhere ID     This is your Care EveryWhere ID. This could be used by other organizations to access your [place] medical records  [id number]        Your Vitals Were     Pulse Temperature Height Last Period Pulse Oximetry BMI (Body Mass Index)    93 98.5  F (36.9  C) (Oral) 5' 0.5\" (1.537 m) 2017 100% 20.44 kg/m2    "    Blood Pressure from Last 3 Encounters:   07/17/17 100/59   07/16/17 100/56   06/13/17 98/64    Weight from Last 3 Encounters:   07/17/17 106 lb 6.4 oz (48.3 kg)   07/16/17 110 lb (49.9 kg)   06/13/17 102 lb 11.2 oz (46.6 kg)              Today, you had the following     No orders found for display       Primary Care Provider Office Phone # Fax #    Annelise Figueroa [phone]       XXX RESIGNED  [address] [facility] [address]  [facility] [address]        Equal Access to Services     [facility]: Hadii aad ku hadasho Sokylie, waaxda luqadaha, qaybta kaalmada adeedinyada, laura park . So [facility] [phone].    ATENCIÓN: Si habla español, tiene a soto disposición servicios gratuitos de asistencia lingüística. [facility] [phone].    We comply with applicable federal civil rights laws and [place] laws. We do not discriminate on the basis of race, color, national origin, age, disability sex, sexual orientation or gender identity.            Thank you!     Thank you for choosing [facility]  for your care. Our goal is always to provide you with excellent care. Hearing back from our patients is one way we can continue to improve our services. Please take a few minutes to complete the written survey that you may receive in the mail after your visit with us. Thank you!             Your Updated Medication List - Protect others around you: Learn how to safely use, store and throw away your medicines at www.disposemymeds.org.          This list is accurate as of: 7/17/17  3:52 PM.  Always use your most recent med list.                   Brand Name Dispense Instructions for use Diagnosis    prenatal multivitamin  plus iron 27-0.8 MG Tabs per tablet     100 tablet    Take 1 tablet by mouth daily    Supervision of normal pregnancy in first trimester

## 2017-07-18 ASSESSMENT — PATIENT HEALTH QUESTIONNAIRE - PHQ9: SUM OF ALL RESPONSES TO PHQ QUESTIONS 1-9: 1

## 2017-08-21 ENCOUNTER — PRENATAL OFFICE VISIT (OUTPATIENT)
Dept: OBGYN | Facility: CLINIC | Age: 37
End: 2017-08-21
Payer: COMMERCIAL

## 2017-08-21 ENCOUNTER — RADIANT APPOINTMENT (OUTPATIENT)
Dept: ULTRASOUND IMAGING | Facility: CLINIC | Age: 37
End: 2017-08-21
Attending: OBSTETRICS & GYNECOLOGY
Payer: COMMERCIAL

## 2017-08-21 VITALS
TEMPERATURE: 97.9 F | BODY MASS INDEX: 20.94 KG/M2 | HEART RATE: 77 BPM | SYSTOLIC BLOOD PRESSURE: 90 MMHG | WEIGHT: 109 LBS | DIASTOLIC BLOOD PRESSURE: 62 MMHG

## 2017-08-21 DIAGNOSIS — O34.219 PREVIOUS CESAREAN DELIVERY, ANTEPARTUM CONDITION OR COMPLICATION: ICD-10-CM

## 2017-08-21 DIAGNOSIS — O09.622 HIGH-RISK PREGNANCY, YOUNG MULTIGRAVIDA IN SECOND TRIMESTER: Primary | ICD-10-CM

## 2017-08-21 DIAGNOSIS — O09.622 HIGH-RISK PREGNANCY, YOUNG MULTIGRAVIDA IN SECOND TRIMESTER: ICD-10-CM

## 2017-08-21 PROCEDURE — 76805 OB US >/= 14 WKS SNGL FETUS: CPT | Performed by: OBSTETRICS & GYNECOLOGY

## 2017-08-21 PROCEDURE — 99207 ZZC PRENATAL VISIT: CPT | Performed by: OBSTETRICS & GYNECOLOGY

## 2017-08-21 NOTE — MR AVS SNAPSHOT
After Visit Summary   2017    Ramona Hill    MRN: [id number]           Patient Information     Date Of Birth          1980        Visit Information        Provider Department      2017 4:30 PM Zo Fox MD [facility]        Today's Diagnoses     High-risk pregnancy, young multigravida in second trimester    -  1    Previous  delivery, antepartum condition or complication           Follow-ups after your visit        Your next 10 appointments already scheduled     Sep 18, 2017  5:00 PM CDT   ESTABLISHED PRENATAL with Zo Fox MD   [facility] ([facility])    [address] [place] [address] [address]  [facility] [address] [phone]            Oct 16, 2017  2:00 PM CDT   ESTABLISHED PRENATAL with Zo Fox MD   [facility] ([facility])    [address] [place] [address] [address]  [facility] [address]   [phone]              Future tests that were ordered for you today     Open Future Orders        Priority Expected Expires Ordered    Vitamin D Deficiency Routine  2018    Glucose tolerance gest screen 1 hour Routine  2018    OB hemoglobin Routine  2018            Who to contact     If you have questions or need follow up information about today's clinic visit or your schedule please contact [facility] directly at [phone].  Normal or non-critical lab and imaging results will be communicated to you by MyChart, letter or phone within 4 business days after the clinic has received the results. If you do not hear from us within 7 days, please contact the clinic through MyChart or phone. If you have a critical or abnormal lab result, we will notify you by phone as soon as possible.  Submit refill requests through Ombu or call your pharmacy and they will forward the refill request to us. Please allow 3  business days for your refill to be completed.          Additional Information About Your Visit        MyChart Information     Espinelahart gives you secure access to your electronic health record. If you see a primary care provider, you can also send messages to your care team and make appointments. If you have questions, please call your primary care clinic.  If you do not have a primary care provider, please call [phone] and they will assist you.        Care EveryWhere ID     This is your Care EveryWhere ID. This could be used by other organizations to access your [place] medical records  [id number]        Your Vitals Were     Pulse Temperature Last Period BMI (Body Mass Index)          77 97.9  F (36.6  C) (Oral) 03/22/2017 20.94 kg/m2         Blood Pressure from Last 3 Encounters:   08/21/17 90/62   07/17/17 100/59   07/16/17 100/56    Weight from Last 3 Encounters:   08/21/17 109 lb (49.4 kg)   07/17/17 106 lb 6.4 oz (48.3 kg)   07/16/17 110 lb (49.9 kg)               Primary Care Provider Office Phone # Fax #    Annelise Figueroa [phone]       XXX RESIGNED  [address] [facility] [address]  [facility] [address]        Equal Access to Services     CASTRO PALMA : Hadii aad ku hadasho Soomaali, waaxda luqadaha, qaybta kaalmada adeegyada, waxay idiin hayrosalien yair park . So [facility] [phone].    ATENCIÓN: Si habla español, tiene a soto disposición servicios gratuitos de asistencia lingüística. Llame al [phone].    We comply with applicable federal civil rights laws and [place] laws. We do not discriminate on the basis of race, color, national origin, age, disability sex, sexual orientation or gender identity.            Thank you!     Thank you for choosing [facility]  for your care. Our goal is always to provide you with excellent care. Hearing back from our patients is one way we can continue to improve our services. Please take a few minutes to complete the  written survey that you may receive in the mail after your visit with us. Thank you!             Your Updated Medication List - Protect others around you: Learn how to safely use, store and throw away your medicines at www.disposemymeds.org.          This list is accurate as of: 8/21/17  7:23 PM.  Always use your most recent med list.                   Brand Name Dispense Instructions for use Diagnosis    prenatal multivitamin plus iron 27-0.8 MG Tabs per tablet     100 tablet    Take 1 tablet by mouth daily    Supervision of normal pregnancy in first trimester

## 2017-08-21 NOTE — PROGRESS NOTES
21w5d  No complaints. Feeling much better.  Had normal survey.  Another girl.   Reviewed weight gain.  discussed calcium intake, (does not drink milk).     Plans repeat  section for delivery.   KATHLEEN

## 2017-09-18 ENCOUNTER — PRENATAL OFFICE VISIT (OUTPATIENT)
Dept: OBGYN | Facility: CLINIC | Age: 37
End: 2017-09-18
Payer: COMMERCIAL

## 2017-09-18 VITALS
HEIGHT: 61 IN | SYSTOLIC BLOOD PRESSURE: 105 MMHG | DIASTOLIC BLOOD PRESSURE: 73 MMHG | WEIGHT: 119.5 LBS | OXYGEN SATURATION: 100 % | HEART RATE: 83 BPM | BODY MASS INDEX: 22.56 KG/M2

## 2017-09-18 DIAGNOSIS — O34.219 PREVIOUS CESAREAN DELIVERY, ANTEPARTUM CONDITION OR COMPLICATION: ICD-10-CM

## 2017-09-18 DIAGNOSIS — O09.622 HIGH-RISK PREGNANCY, YOUNG MULTIGRAVIDA IN SECOND TRIMESTER: Primary | ICD-10-CM

## 2017-09-18 LAB
ERYTHROCYTE [DISTWIDTH] IN BLOOD BY AUTOMATED COUNT: 14.1 % (ref 10–15)
GLUCOSE 1H P 50 G GLC PO SERPL-MCNC: 117 MG/DL (ref 60–129)
HCT VFR BLD AUTO: 33 % (ref 35–47)
HGB BLD-MCNC: 11.1 G/DL (ref 11.7–15.7)
HGB BLD-MCNC: 11.1 G/DL (ref 11.7–15.7)
MCH RBC QN AUTO: 33.1 PG (ref 26.5–33)
MCHC RBC AUTO-ENTMCNC: 33.6 G/DL (ref 31.5–36.5)
MCV RBC AUTO: 99 FL (ref 78–100)
PLATELET # BLD AUTO: 153 10E9/L (ref 150–450)
RBC # BLD AUTO: 3.35 10E12/L (ref 3.8–5.2)
WBC # BLD AUTO: 13.3 10E9/L (ref 4–11)

## 2017-09-18 PROCEDURE — 99207 ZZC PRENATAL VISIT: CPT | Performed by: OBSTETRICS & GYNECOLOGY

## 2017-09-18 PROCEDURE — 36415 COLL VENOUS BLD VENIPUNCTURE: CPT | Performed by: OBSTETRICS & GYNECOLOGY

## 2017-09-18 PROCEDURE — 82950 GLUCOSE TEST: CPT | Performed by: OBSTETRICS & GYNECOLOGY

## 2017-09-18 PROCEDURE — 85027 COMPLETE CBC AUTOMATED: CPT | Performed by: OBSTETRICS & GYNECOLOGY

## 2017-09-18 PROCEDURE — 00000218 ZZHCL STATISTIC OBHBG - HEMOGLOBIN: Performed by: OBSTETRICS & GYNECOLOGY

## 2017-09-18 NOTE — MR AVS SNAPSHOT
After Visit Summary   2017    Ramona Hill    MRN: [id number]           Patient Information     Date Of Birth          1980        Visit Information        Provider Department      2017 5:00 PM Zo Fox MD [facility]        Today's Diagnoses     High-risk pregnancy, young multigravida in second trimester    -  1    Previous  delivery, antepartum condition or complication           Follow-ups after your visit        Your next 10 appointments already scheduled     Oct 16, 2017  2:00 PM CDT   ESTABLISHED PRENATAL with Zo Fox MD   [facility] [place]    [address] [place] [address] [phone]              Who to contact     If you have questions or need follow up information about today's clinic visit or your schedule please contact [facility] directly at [phone].  Normal or non-critical lab and imaging results will be communicated to you by MyChart, letter or phone within 4 business days after the clinic has received the results. If you do not hear from us within 7 days, please contact the clinic through Accelitechart or phone. If you have a critical or abnormal lab result, we will notify you by phone as soon as possible.  Submit refill requests through Kadient or call your pharmacy and they will forward the refill request to us. Please allow 3 business days for your refill to be completed.          Additional Information About Your Visit        MyChart Information     Kadient gives you secure access to your electronic health record. If you see a primary care provider, you can also send messages to your care team and make appointments. If you have questions, please call your primary care clinic.  If you do not have a primary care provider, please call [phone] and they will assist you.        Care EveryWhere ID     This is your Care EveryWhere ID.  "This could be used by other organizations to access your [place] medical records  [id number]        Your Vitals Were     Pulse Height Last Period Pulse Oximetry BMI (Body Mass Index)       83 5' 0.5\" (1.537 m) 03/22/2017 100% 22.95 kg/m2        Blood Pressure from Last 3 Encounters:   09/18/17 105/73   08/21/17 90/62   07/17/17 100/59    Weight from Last 3 Encounters:   09/18/17 119 lb 8 oz (54.2 kg)   08/21/17 109 lb (49.4 kg)   07/17/17 106 lb 6.4 oz (48.3 kg)              We Performed the Following     CBC with platelets     Glucose tolerance gest screen 1 hour     OB hemoglobin     Vitamin D Deficiency        Primary Care Provider Office Phone # Fax #    Annelise Figueroa [phone]       XXX RESIGNED  [address]  [facility] [address]        Equal Access to Services     CASTRO PALMA : Hadii aad ku hadasho Soomaali, waaxda luqadaha, qaybta kaalmada adeegyada, waxay goldenin hayrosalien yair park . So [facility] [phone].    ATENCIÓN: Si favio pearson, tiene a soto disposición servicios gratuitos de asistencia lingüística. Llame al [phone].    We comply with applicable federal civil rights laws and [place] laws. We do not discriminate on the basis of race, color, national origin, age, disability sex, sexual orientation or gender identity.            Thank you!     Thank you for choosing [facility]  for your care. Our goal is always to provide you with excellent care. Hearing back from our patients is one way we can continue to improve our services. Please take a few minutes to complete the written survey that you may receive in the mail after your visit with us. Thank you!             Your Updated Medication List - Protect others around you: Learn how to safely use, store and throw away your medicines at www.disposemymeds.org.          This list is accurate as of: 9/18/17  5:40 PM.  Always use your most recent med list.                   Brand Name " Dispense Instructions for use Diagnosis    prenatal multivitamin plus iron 27-0.8 MG Tabs per tablet     100 tablet    Take 1 tablet by mouth daily    Supervision of normal pregnancy in first trimester

## 2017-09-18 NOTE — PROGRESS NOTES
25w5d  No complaints.  Appetite is picking up and so is her weight.    Baby is moving. GCT today.  Has noted more nose bleeds.    discussed repeat  section at 39 wks and will get that scheduled.  KATHLEEN

## 2017-10-16 ENCOUNTER — PRENATAL OFFICE VISIT (OUTPATIENT)
Dept: OBGYN | Facility: CLINIC | Age: 37
End: 2017-10-16
Payer: MEDICAID

## 2017-10-16 VITALS
TEMPERATURE: 98.6 F | HEART RATE: 95 BPM | BODY MASS INDEX: 23.33 KG/M2 | SYSTOLIC BLOOD PRESSURE: 107 MMHG | DIASTOLIC BLOOD PRESSURE: 63 MMHG | OXYGEN SATURATION: 99 % | WEIGHT: 123.6 LBS | HEIGHT: 61 IN

## 2017-10-16 DIAGNOSIS — Z23 NEED FOR TDAP VACCINATION: ICD-10-CM

## 2017-10-16 DIAGNOSIS — O09.893 SUPERVISION OF OTHER HIGH RISK PREGNANCIES, THIRD TRIMESTER: Primary | ICD-10-CM

## 2017-10-16 DIAGNOSIS — O34.219 PREVIOUS CESAREAN DELIVERY, ANTEPARTUM CONDITION OR COMPLICATION: ICD-10-CM

## 2017-10-16 DIAGNOSIS — Z23 NEED FOR PROPHYLACTIC VACCINATION AND INOCULATION AGAINST INFLUENZA: ICD-10-CM

## 2017-10-16 PROCEDURE — 90472 IMMUNIZATION ADMIN EACH ADD: CPT | Performed by: OBSTETRICS & GYNECOLOGY

## 2017-10-16 PROCEDURE — 90471 IMMUNIZATION ADMIN: CPT | Performed by: OBSTETRICS & GYNECOLOGY

## 2017-10-16 PROCEDURE — 90686 IIV4 VACC NO PRSV 0.5 ML IM: CPT | Performed by: OBSTETRICS & GYNECOLOGY

## 2017-10-16 PROCEDURE — 90715 TDAP VACCINE 7 YRS/> IM: CPT | Performed by: OBSTETRICS & GYNECOLOGY

## 2017-10-16 PROCEDURE — 99207 ZZC PRENATAL VISIT: CPT | Performed by: OBSTETRICS & GYNECOLOGY

## 2017-10-16 NOTE — PROGRESS NOTES
29w5d   No complaints.  Baby is moving well.  Passed GCT.   discussed tdap and flu shot.  Will get both today.    Reviewed normal weight gain.   KATHLEEN

## 2017-10-16 NOTE — MR AVS SNAPSHOT
"              After Visit Summary   10/16/2017    Ramona Hill    MRN: [id number]           Patient Information     Date Of Birth          1980        Visit Information        Provider Department      10/16/2017 2:00 PM Zo Fox MD [facility]        Today's Diagnoses     Supervision of other high risk pregnancies, third trimester    -  1    Previous  delivery, antepartum condition or complication           Follow-ups after your visit        Who to contact     If you have questions or need follow up information about today's clinic visit or your schedule please contact [facility] directly at [phone].  Normal or non-critical lab and imaging results will be communicated to you by Femta Pharmaceuticalshart, letter or phone within 4 business days after the clinic has received the results. If you do not hear from us within 7 days, please contact the clinic through Femta Pharmaceuticalshart or phone. If you have a critical or abnormal lab result, we will notify you by phone as soon as possible.  Submit refill requests through FourthWall Media or call your pharmacy and they will forward the refill request to us. Please allow 3 business days for your refill to be completed.          Additional Information About Your Visit        MyChart Information     FourthWall Media gives you secure access to your electronic health record. If you see a primary care provider, you can also send messages to your care team and make appointments. If you have questions, please call your primary care clinic.  If you do not have a primary care provider, please call [phone] and they will assist you.        Care EveryWhere ID     This is your Care EveryWhere ID. This could be used by other organizations to access your [place] medical records  [id number]        Your Vitals Were     Pulse Temperature Height Last Period Pulse Oximetry BMI (Body Mass Index)    95 98.6  F (37  C) (Oral) 5' 0.5\" (1.537 m) 2017 99% 23.74 " kg/m2       Blood Pressure from Last 3 Encounters:   10/16/17 107/63   09/18/17 105/73   08/21/17 90/62    Weight from Last 3 Encounters:   10/16/17 123 lb 9.6 oz (56.1 kg)   09/18/17 119 lb 8 oz (54.2 kg)   08/21/17 109 lb (49.4 kg)              Today, you had the following     No orders found for display       Primary Care Provider Office Phone # Fax #    Annelise Figueroa [phone]       XXX RESIGNED  [address]  [facility] [address]        Equal Access to Services     [facility]: Hadii aad thelma hadasho Sokylie, waaxda luqadaha, qaybta kaalmada stephen, laura park . So [facility] [phone].    ATENCIÓN: Si habla español, tiene a soto disposición servicios gratuitos de asistencia lingüística. LlSam[facility] [phone].    We comply with applicable federal civil rights laws and [place] laws. We do not discriminate on the basis of race, color, national origin, age, disability, sex, sexual orientation, or gender identity.            Thank you!     Thank you for choosing [facility]  for your care. Our goal is always to provide you with excellent care. Hearing back from our patients is one way we can continue to improve our services. Please take a few minutes to complete the written survey that you may receive in the mail after your visit with us. Thank you!             Your Updated Medication List - Protect others around you: Learn how to safely use, store and throw away your medicines at www.disposemymeds.org.          This list is accurate as of: 10/16/17  2:44 PM.  Always use your most recent med list.                   Brand Name Dispense Instructions for use Diagnosis    prenatal multivitamin plus iron 27-0.8 MG Tabs per tablet     100 tablet    Take 1 tablet by mouth daily    Supervision of normal pregnancy in first trimester

## 2017-11-17 DIAGNOSIS — Z34.81 ENCOUNTER FOR SUPERVISION OF OTHER NORMAL PREGNANCY IN FIRST TRIMESTER: ICD-10-CM

## 2017-11-17 RX ORDER — PRENATAL VIT/IRON FUM/FOLIC AC 27MG-0.8MG
1 TABLET ORAL DAILY
Qty: 100 TABLET | Refills: 3 | Status: SHIPPED | OUTPATIENT
Start: 2017-11-17 | End: 2018-02-19

## 2017-11-17 NOTE — TELEPHONE ENCOUNTER
Pharmacy sent refill request for prenatal vitamin.  Pt is coming to regularly scheduled prenatal visits.  Next visit 11/20/17.  Refill sent per protocol.  Carley Lozano RN

## 2017-11-20 ENCOUNTER — PRENATAL OFFICE VISIT (OUTPATIENT)
Dept: OBGYN | Facility: CLINIC | Age: 37
End: 2017-11-20
Payer: MEDICAID

## 2017-11-20 VITALS
DIASTOLIC BLOOD PRESSURE: 67 MMHG | HEIGHT: 61 IN | SYSTOLIC BLOOD PRESSURE: 105 MMHG | BODY MASS INDEX: 23.88 KG/M2 | HEART RATE: 87 BPM | OXYGEN SATURATION: 99 % | WEIGHT: 126.5 LBS | TEMPERATURE: 96.9 F

## 2017-11-20 DIAGNOSIS — O34.219 PREVIOUS CESAREAN DELIVERY, ANTEPARTUM CONDITION OR COMPLICATION: ICD-10-CM

## 2017-11-20 DIAGNOSIS — O09.623 HIGH-RISK PREGNANCY, YOUNG MULTIGRAVIDA, THIRD TRIMESTER: Primary | ICD-10-CM

## 2017-11-20 PROBLEM — O09.622 HIGH-RISK PREGNANCY, YOUNG MULTIGRAVIDA IN SECOND TRIMESTER: Status: RESOLVED | Noted: 2017-07-17 | Resolved: 2017-11-20

## 2017-11-20 PROCEDURE — T1013 SIGN LANG/ORAL INTERPRETER: HCPCS | Mod: U3 | Performed by: OBSTETRICS & GYNECOLOGY

## 2017-11-20 PROCEDURE — 99207 ZZC PRENATAL VISIT: CPT | Performed by: OBSTETRICS & GYNECOLOGY

## 2017-11-20 NOTE — LETTER
Ramona Hill was scheduled for surgery 2017     Surgeon: Zo Fox MD  Procedure: cesearan section--repeat  Diagnosis: prev  section   Day & Time Preference: at 10:30  Assistance Requested:No  Length of surgery:1-2 hours  Surgery Instructions:Morning Admit     No Known Allergies

## 2017-11-20 NOTE — MR AVS SNAPSHOT
"              After Visit Summary   2017    Ramona Hill    MRN: [id number]           Patient Information     Date Of Birth          1980        Visit Information        Provider Department      2017 4:45 PM Zo Fox MD; [employer] [facility]        Today's Diagnoses     High-risk pregnancy, young multigravida, third trimester    -  1    Previous  delivery, antepartum condition or complication           Follow-ups after your visit        Who to contact     If you have questions or need follow up information about today's clinic visit or your schedule please contact [facility] directly at [phone].  Normal or non-critical lab and imaging results will be communicated to you by MyChart, letter or phone within 4 business days after the clinic has received the results. If you do not hear from us within 7 days, please contact the clinic through [email or web address] or phone. If you have a critical or abnormal lab result, we will notify you by phone as soon as possible.  Submit refill requests through [employer] or call your pharmacy and they will forward the refill request to us. Please allow 3 business days for your refill to be completed.          Additional Information About Your Visit        MyChart Information     [employer] gives you secure access to your electronic health record. If you see a primary care provider, you can also send messages to your care team and make appointments. If you have questions, please call your primary care clinic.  If you do not have a primary care provider, please call [phone] and they will assist you.        Care EveryWhere ID     This is your Care EveryWhere ID. This could be used by other organizations to access your [facility] medical records  [id number]        Your Vitals Were     Pulse Temperature Height Last Period Pulse Oximetry BMI (Body Mass Index)    87 96.9  F (36.1  C) (Oral) 5' 0.5\" " (1.537 m) 03/22/2017 99% 24.3 kg/m2       Blood Pressure from Last 3 Encounters:   11/20/17 105/67   10/16/17 107/63   09/18/17 105/73    Weight from Last 3 Encounters:   11/20/17 126 lb 8 oz (57.4 kg)   10/16/17 123 lb 9.6 oz (56.1 kg)   09/18/17 119 lb 8 oz (54.2 kg)              Today, you had the following     No orders found for display       Primary Care Provider Office Phone # Fax #    Annelise Figueroa [phone]       XXX RESIGNED  [address] [facility] [address]  [facility] [address]        Equal Access to Services     CASTRO PALMA : Hadii jatin renee hadasho Sokylie, waaxda luqadaha, qaybta kaalmada adeegyada, waxvivien franksin ghada park . So [facility] [phone].    ATENCIÓN: Si habla español, tiene a soto disposición servicios gratuitos de asistencia lingüística. Peter al [phone].    We comply with applicable federal civil rights laws and [place] laws. We do not discriminate on the basis of race, color, national origin, age, disability, sex, sexual orientation, or gender identity.            Thank you!     Thank you for choosing [facility]  for your care. Our goal is always to provide you with excellent care. Hearing back from our patients is one way we can continue to improve our services. Please take a few minutes to complete the written survey that you may receive in the mail after your visit with us. Thank you!             Your Updated Medication List - Protect others around you: Learn how to safely use, store and throw away your medicines at www.disposemymeds.org.          This list is accurate as of: 11/20/17  6:59 PM.  Always use your most recent med list.                   Brand Name Dispense Instructions for use Diagnosis    prenatal multivitamin plus iron 27-0.8 MG Tabs per tablet     100 tablet    Take 1 tablet by mouth daily    Encounter for supervision of other normal pregnancy in first trimester

## 2017-11-21 NOTE — PROGRESS NOTES
34w5d   No complaints.  Baby is moving well.  Interpretor is here today but usually we don't use one.  Patient and  are comfortable without and I agree.   We discussed timing of repeat  section.  Will get scheduled for 39+2 wks.   Baby feels breech today and patient reports more pressure in her pelvis.  FH lagging a little today as well.  Might be position of baby.  Will reassess at [place]. KATHLEEN

## 2017-11-22 RX ORDER — SODIUM CHLORIDE, SODIUM LACTATE, POTASSIUM CHLORIDE, CALCIUM CHLORIDE 600; 310; 30; 20 MG/100ML; MG/100ML; MG/100ML; MG/100ML
INJECTION, SOLUTION INTRAVENOUS CONTINUOUS
Status: CANCELLED | OUTPATIENT
Start: 2017-12-22

## 2017-11-22 RX ORDER — CEFAZOLIN SODIUM 2 G/100ML
2 INJECTION, SOLUTION INTRAVENOUS
Status: CANCELLED | OUTPATIENT
Start: 2017-12-22

## 2017-11-22 RX ORDER — CEFAZOLIN SODIUM 1 G/3ML
1 INJECTION, POWDER, FOR SOLUTION INTRAMUSCULAR; INTRAVENOUS SEE ADMIN INSTRUCTIONS
Status: CANCELLED | OUTPATIENT
Start: 2017-12-22

## 2017-11-22 RX ORDER — CITRIC ACID/SODIUM CITRATE 334-500MG
30 SOLUTION, ORAL ORAL
Status: CANCELLED | OUTPATIENT
Start: 2017-12-22

## 2017-12-04 ENCOUNTER — PRENATAL OFFICE VISIT (OUTPATIENT)
Dept: OBGYN | Facility: CLINIC | Age: 37
End: 2017-12-04
Payer: COMMERCIAL

## 2017-12-04 VITALS
BODY MASS INDEX: 24.45 KG/M2 | DIASTOLIC BLOOD PRESSURE: 68 MMHG | TEMPERATURE: 97.9 F | WEIGHT: 129.5 LBS | OXYGEN SATURATION: 99 % | SYSTOLIC BLOOD PRESSURE: 110 MMHG | HEIGHT: 61 IN | HEART RATE: 95 BPM

## 2017-12-04 DIAGNOSIS — O09.623 HIGH-RISK PREGNANCY, YOUNG MULTIGRAVIDA, THIRD TRIMESTER: Primary | ICD-10-CM

## 2017-12-04 DIAGNOSIS — Z98.891 S/P CESAREAN SECTION: ICD-10-CM

## 2017-12-04 LAB — HGB BLD-MCNC: 11.8 G/DL (ref 11.7–15.7)

## 2017-12-04 PROCEDURE — 87653 STREP B DNA AMP PROBE: CPT | Performed by: OBSTETRICS & GYNECOLOGY

## 2017-12-04 PROCEDURE — T1013 SIGN LANG/ORAL INTERPRETER: HCPCS | Mod: U3 | Performed by: OBSTETRICS & GYNECOLOGY

## 2017-12-04 PROCEDURE — 87186 SC STD MICRODIL/AGAR DIL: CPT | Performed by: OBSTETRICS & GYNECOLOGY

## 2017-12-04 PROCEDURE — 36416 COLLJ CAPILLARY BLOOD SPEC: CPT | Performed by: OBSTETRICS & GYNECOLOGY

## 2017-12-04 PROCEDURE — 00000218 ZZHCL STATISTIC OBHBG - HEMOGLOBIN: Performed by: OBSTETRICS & GYNECOLOGY

## 2017-12-04 PROCEDURE — 99207 ZZC PRENATAL VISIT: CPT | Performed by: OBSTETRICS & GYNECOLOGY

## 2017-12-04 ASSESSMENT — PATIENT HEALTH QUESTIONNAIRE - PHQ9: SUM OF ALL RESPONSES TO PHQ QUESTIONS 1-9: 0

## 2017-12-04 NOTE — PROGRESS NOTES
Handouts given    Breastfeeding Recourse List    Rx or a Healthy Labor, Birth, and Recovery  Using Nitrous Oxide During Labor and Delivery   Labor and Birth Position Guide    Labs today    GBS, if not already positive  OB hemoglobin

## 2017-12-04 NOTE — PROGRESS NOTES
36w5d  No complaints.  Baby is moving well.   No ctx's.  GBS and hgb today.    discussed repeat  section, process and what to expect.   They requested staff to do  section, ok for resident assist.  KATHLEEN

## 2017-12-04 NOTE — MR AVS SNAPSHOT
After Visit Summary   2017    Ramona Hill    MRN: [id number]           Patient Information     Date Of Birth          1980        Visit Information        Provider Department      2017 4:45 PM Zo Fox MD; [employer] [facility]        Today's Diagnoses     High-risk pregnancy, young multigravida, third trimester    -  1    S/P  section           Follow-ups after your visit        Your next 10 appointments already scheduled     Dec 12, 2017  2:30 PM CST   ESTABLISHED PRENATAL with Jory Cueva MD   [facility]    [address] [place] [address] [phone]            Dec 19, 2017  4:00 PM CST   LAB with RD LAB   [facility]    [address] [place] [address] [phone]           Please do not eat 10-12 hours before your appointment if you are coming in fasting for labs on lipids, cholesterol, or glucose (sugar). This does not apply to pregnant women. Water, hot tea and black coffee (with nothing added) are okay. Do not drink other fluids, diet soda or chew gum.            Dec 21, 2017  3:45 PM CST   ESTABLISHED PRENATAL with Zo Fox MD   [facility]    [address] [facility] [address] [phone]            Dec 22, 2017   Procedure with Zo Fox MD   [facility] (--)    [address] [place] [address] [phone]              Who to contact     If you have questions or need follow up information about today's clinic visit or your schedule please contact [facility] directly at [phone].  Normal or non-critical lab and imaging results will be communicated to you by MyChart, letter or phone within 4 business days after the clinic has received the results.  "If you do not hear from us within 7 days, please contact the clinic through [employer] or phone. If you have a critical or abnormal lab result, we will notify you by phone as soon as possible.  Submit refill requests through [employer] or call your pharmacy and they will forward the refill request to us. Please allow 3 business days for your refill to be completed.          Additional Information About Your Visit        [employer] Information     [employer] gives you secure access to your electronic health record. If you see a primary care provider, you can also send messages to your care team and make appointments. If you have questions, please call your primary care clinic.  If you do not have a primary care provider, please call [phone] and they will assist you.        Care EveryWhere ID     This is your Care EveryWhere ID. This could be used by other organizations to access your [facility] medical records  [id number]        Your Vitals Were     Pulse Temperature Height Last Period Pulse Oximetry BMI (Body Mass Index)    95 97.9  F (36.6  C) (Oral) 5' 0.5\" (1.537 m) 03/22/2017 99% 24.87 kg/m2       Blood Pressure from Last 3 Encounters:   12/04/17 110/68   11/20/17 105/67   10/16/17 107/63    Weight from Last 3 Encounters:   12/04/17 129 lb 8 oz (58.7 kg)   11/20/17 126 lb 8 oz (57.4 kg)   10/16/17 123 lb 9.6 oz (56.1 kg)              We Performed the Following     Group B strep PCR     OB hemoglobin     Referral sensitivity        Primary Care Provider Office Phone # Fax #    Annelise Figueroa [phone]       XXX RESIGNED  [address] [facility] [address]  [facility] [address]        Equal Access to Services     RAUL PALMA AH: Hadii jatin Browne, terra hines, laura carr. So [facility] [phone].    ATENCIÓN: Si habla español, tiene a soto disposición servicios gratuitos de asistencia lingüística. Llame al [phone].    We comply with " applicable federal civil rights laws and [place] laws. We do not discriminate on the basis of race, color, national origin, age, disability, sex, sexual orientation, or gender identity.            Thank you!     Thank you for choosing [facility] [place] [place]  for your care. Our goal is always to provide you with excellent care. Hearing back from our patients is one way we can continue to improve our services. Please take a few minutes to complete the written survey that you may receive in the mail after your visit with us. Thank you!             Your Updated Medication List - Protect others around you: Learn how to safely use, store and throw away your medicines at www.disposemymeds.org.          This list is accurate as of: 12/4/17 11:59 PM.  Always use your most recent med list.                   Brand Name Dispense Instructions for use Diagnosis    prenatal multivitamin plus iron 27-0.8 MG Tabs per tablet     100 tablet    Take 1 tablet by mouth daily    Encounter for supervision of other normal pregnancy in first trimester

## 2017-12-05 LAB
GP B STREP DNA SPEC QL NAA+PROBE: POSITIVE
SPECIMEN SOURCE: ABNORMAL

## 2017-12-08 LAB
BACTERIA SPEC CULT: ABNORMAL
SPECIMEN SOURCE: ABNORMAL

## 2017-12-12 ENCOUNTER — PRENATAL OFFICE VISIT (OUTPATIENT)
Dept: OBGYN | Facility: CLINIC | Age: 37
End: 2017-12-12
Payer: COMMERCIAL

## 2017-12-12 VITALS
HEART RATE: 93 BPM | WEIGHT: 129.9 LBS | TEMPERATURE: 96.6 F | DIASTOLIC BLOOD PRESSURE: 64 MMHG | SYSTOLIC BLOOD PRESSURE: 108 MMHG | BODY MASS INDEX: 24.95 KG/M2

## 2017-12-12 DIAGNOSIS — O34.219 PREVIOUS CESAREAN DELIVERY, ANTEPARTUM CONDITION OR COMPLICATION: Primary | ICD-10-CM

## 2017-12-12 PROCEDURE — T1013 SIGN LANG/ORAL INTERPRETER: HCPCS | Mod: U3 | Performed by: OBSTETRICS & GYNECOLOGY

## 2017-12-12 PROCEDURE — 99207 ZZC PRENATAL VISIT: CPT | Performed by: OBSTETRICS & GYNECOLOGY

## 2017-12-12 NOTE — MR AVS SNAPSHOT
After Visit Summary   2017    Ramona Hill    MRN: [id number]           Patient Information     Date Of Birth          1980        Visit Information        Provider Department      2017 2:15 PM Natalya Roman; Jory Cueva MD [facility]        Today's Diagnoses     Previous  delivery, antepartum condition or complication    -  1       Follow-ups after your visit        Your next 10 appointments already scheduled     Dec 19, 2017  4:00 PM CST   LAB with RD LAB   [facility] ([facility]    [address] [place] [address] [phone]           Please do not eat 10-12 hours before your appointment if you are coming in fasting for labs on lipids, cholesterol, or glucose (sugar). This does not apply to pregnant women. Water, hot tea and black coffee (with nothing added) are okay. Do not drink other fluids, diet soda or chew gum.            Dec 21, 2017  3:45 PM CST   ESTABLISHED PRENATAL with Zo Fox MD   [facility]    [address] [facility] [address] [phone]            Dec 22, 2017   Procedure with Zo Fox MD   [facility] (--)    [address] [place] [address] [phone]              Who to contact     If you have questions or need follow up information about today's clinic visit or your schedule please contact [facility] directly at [phone].  Normal or non-critical lab and imaging results will be communicated to you by MyChart, letter or phone within 4 business days after the clinic has received the results. If you do not hear from us within 7 days, please contact the clinic through MyChart or phone. If you have a critical or abnormal lab result, we will notify you by phone as soon as possible.  Submit refill requests through [employer] or call your pharmacy and  they will forward the refill request to us. Please allow 3 business days for your refill to be completed.          Additional Information About Your Visit        [employer] Information     [employer] gives you secure access to your electronic health record. If you see a primary care provider, you can also send messages to your care team and make appointments. If you have questions, please call your primary care clinic.  If you do not have a primary care provider, please call [phone] and they will assist you.        Care EveryWhere ID     This is your Care EveryWhere ID. This could be used by other organizations to access your [facility] medical records  [id number]        Your Vitals Were     Pulse Temperature Last Period BMI (Body Mass Index)          93 96.6  F (35.9  C) (Oral) 03/22/2017 24.95 kg/m2         Blood Pressure from Last 3 Encounters:   12/12/17 108/64   12/04/17 110/68   11/20/17 105/67    Weight from Last 3 Encounters:   12/12/17 129 lb 14.4 oz (58.9 kg)   12/04/17 129 lb 8 oz (58.7 kg)   11/20/17 126 lb 8 oz (57.4 kg)              Today, you had the following     No orders found for display       Primary Care Provider Office Phone # Fax #    Annelise Figueroa [phone]       XXX RESIGNED  [address] [facility] [address]  [facility] [address]        Equal Access to Services     [facility]: Hadii aad ku hadasho Sokylie, waaxda luqadaha, qaybta kaalmada stephen, laura park . So [facility] [phone].    ATENCIÓN: Si habla español, tiene a soto disposición servicios gratuitos de asistencia lingüística. Llame al [phone].    We comply with applicable federal civil rights laws and [place] laws. We do not discriminate on the basis of race, color, national origin, age, disability, sex, sexual orientation, or gender identity.            Thank you!     Thank you for choosing [facility]  for your care. Our goal is always to provide you with  excellent care. Hearing back from our patients is one way we can continue to improve our services. Please take a few minutes to complete the written survey that you may receive in the mail after your visit with us. Thank you!             Your Updated Medication List - Protect others around you: Learn how to safely use, store and throw away your medicines at www.disposemymeds.org.          This list is accurate as of: 12/12/17  2:48 PM.  Always use your most recent med list.                   Brand Name Dispense Instructions for use Diagnosis    prenatal multivitamin plus iron 27-0.8 MG Tabs per tablet     100 tablet    Take 1 tablet by mouth daily    Encounter for supervision of other normal pregnancy in first trimester

## 2017-12-18 DIAGNOSIS — O34.219 PREVIOUS CESAREAN DELIVERY, ANTEPARTUM CONDITION OR COMPLICATION: ICD-10-CM

## 2017-12-18 DIAGNOSIS — Z01.818 PRE-OP EXAM: Primary | ICD-10-CM

## 2017-12-19 ENCOUNTER — ANESTHESIA EVENT (OUTPATIENT)
Dept: OBGYN | Facility: CLINIC | Age: 37
End: 2017-12-19
Payer: COMMERCIAL

## 2017-12-19 DIAGNOSIS — O09.622 HIGH-RISK PREGNANCY, YOUNG MULTIGRAVIDA IN SECOND TRIMESTER: ICD-10-CM

## 2017-12-19 DIAGNOSIS — Z01.818 PRE-OP EXAM: ICD-10-CM

## 2017-12-19 DIAGNOSIS — O34.219 PREVIOUS CESAREAN DELIVERY, ANTEPARTUM CONDITION OR COMPLICATION: ICD-10-CM

## 2017-12-19 PROBLEM — O99.820 GBS (GROUP B STREPTOCOCCUS CARRIER), +RV CULTURE, CURRENTLY PREGNANT: Status: ACTIVE | Noted: 2017-12-19

## 2017-12-19 LAB
ERYTHROCYTE [DISTWIDTH] IN BLOOD BY AUTOMATED COUNT: 13.7 % (ref 10–15)
HCT VFR BLD AUTO: 37.5 % (ref 35–47)
HGB BLD-MCNC: 12.8 G/DL (ref 11.7–15.7)
MCH RBC QN AUTO: 34 PG (ref 26.5–33)
MCHC RBC AUTO-ENTMCNC: 34.1 G/DL (ref 31.5–36.5)
MCV RBC AUTO: 100 FL (ref 78–100)
PLATELET # BLD AUTO: 154 10E9/L (ref 150–450)
RBC # BLD AUTO: 3.77 10E12/L (ref 3.8–5.2)
WBC # BLD AUTO: 15 10E9/L (ref 4–11)

## 2017-12-19 PROCEDURE — 86900 BLOOD TYPING SEROLOGIC ABO: CPT | Performed by: OBSTETRICS & GYNECOLOGY

## 2017-12-19 PROCEDURE — 86850 RBC ANTIBODY SCREEN: CPT | Performed by: OBSTETRICS & GYNECOLOGY

## 2017-12-19 PROCEDURE — T1013 SIGN LANG/ORAL INTERPRETER: HCPCS | Mod: U3 | Performed by: OBSTETRICS & GYNECOLOGY

## 2017-12-19 PROCEDURE — 36415 COLL VENOUS BLD VENIPUNCTURE: CPT | Performed by: OBSTETRICS & GYNECOLOGY

## 2017-12-19 PROCEDURE — 86780 TREPONEMA PALLIDUM: CPT | Performed by: OBSTETRICS & GYNECOLOGY

## 2017-12-19 PROCEDURE — 82306 VITAMIN D 25 HYDROXY: CPT | Performed by: OBSTETRICS & GYNECOLOGY

## 2017-12-19 PROCEDURE — 85027 COMPLETE CBC AUTOMATED: CPT | Performed by: OBSTETRICS & GYNECOLOGY

## 2017-12-19 PROCEDURE — 86901 BLOOD TYPING SEROLOGIC RH(D): CPT | Performed by: OBSTETRICS & GYNECOLOGY

## 2017-12-19 NOTE — ANESTHESIA PREPROCEDURE EVALUATION
Anesthesia Evaluation     . Pt has had prior anesthetic. Type: Regional    History of anesthetic complications (Prior  on 2016 with difficult spinal (5 attempts with redirects), was converted to GETA due to urgent nature of procedure at time. Prior Airway  with CMAC 3 (Easy intubation))          ROS/MED HX    ENT/Pulmonary:  - neg pulmonary ROS     Neurologic:  - neg neurologic ROS     Cardiovascular:  - neg cardiovascular ROS       METS/Exercise Tolerance:     Hematologic:  - neg hematologic  ROS       Musculoskeletal:  - neg musculoskeletal ROS       GI/Hepatic:     (+) GERD       Renal/Genitourinary:  - ROS Renal section negative       Endo:  - neg endo ROS       Psychiatric:     (+) psychiatric history (Post-partum anxiety) anxiety      Infectious Disease: Comment:   -GBS positive          Malignancy:      - no malignancy   Other: Comment:   -, >34 y/o at time of delivery  -Planning C/S at 39w2d on 2017                    Physical Exam  Normal systems: dental    Airway   Mallampati: III  TM distance: >3 FB  Neck ROM: full    Dental     Cardiovascular       Pulmonary     Other findings:   -Appears to have interspace between L2-L4, good palpation of spinous process                Anesthesia Plan      History & Physical Review  History and physical reviewed and following examination; no interval change.    ASA Status:  2 .        Plan for Spinal, Periph. Nerve Block for postop pain and MAC (GETA as backup with known history of difficult spinal. Discussed all risks and benefits) with Other induction. Maintenance will be Other.    PONV prophylaxis:  Ondansetron (or other 5HT-3)    -Plan for spinal, prior known difficult spinal placement during prior C/S (converted to general anesthetic with ETT). Discussed in detail with patient in presence of Belarusian  the options for surgery including spinal (risks and benefits, including failed block or one sided block), GETA and TAP Block post op  (patient consented to procedures). Patient had all questions answered.      Postoperative Care  Postoperative pain management:  Multi-modal analgesia, IV analgesics and Peripheral nerve block (Single Shot).      Consents  Anesthetic plan, risks, benefits and alternatives discussed with:  Patient and Spouse (In presence of English ).  Use of blood products discussed: No .   .        Lab Results   Component Value Date    WBC 15.0 (H) 12/19/2017    HGB 12.8 12/19/2017    HCT 37.5 12/19/2017     12/19/2017

## 2017-12-20 LAB
ABO + RH BLD: NORMAL
ABO + RH BLD: NORMAL
BLD GP AB SCN SERPL QL: NORMAL
BLOOD BANK CMNT PATIENT-IMP: NORMAL
SPECIMEN EXP DATE BLD: NORMAL

## 2017-12-21 LAB
DEPRECATED CALCIDIOL+CALCIFEROL SERPL-MC: 21 UG/L (ref 20–75)
T PALLIDUM IGG+IGM SER QL: NEGATIVE

## 2017-12-22 ENCOUNTER — OFFICE VISIT (OUTPATIENT)
Dept: INTERPRETER SERVICES | Facility: CLINIC | Age: 37
End: 2017-12-22
Payer: COMMERCIAL

## 2017-12-22 ENCOUNTER — ANESTHESIA (OUTPATIENT)
Dept: OBGYN | Facility: CLINIC | Age: 37
End: 2017-12-22
Payer: COMMERCIAL

## 2017-12-22 ENCOUNTER — HOSPITAL ENCOUNTER (INPATIENT)
Facility: CLINIC | Age: 37
LOS: 3 days | Discharge: HOME HEALTH | End: 2017-12-25
Attending: OBSTETRICS & GYNECOLOGY | Admitting: OBSTETRICS & GYNECOLOGY
Payer: COMMERCIAL

## 2017-12-22 DIAGNOSIS — Z98.891 S/P CESAREAN SECTION: Primary | ICD-10-CM

## 2017-12-22 PROCEDURE — 71000015 ZZH RECOVERY PHASE 1 LEVEL 2 EA ADDTL HR: Performed by: OBSTETRICS & GYNECOLOGY

## 2017-12-22 PROCEDURE — 25000125 ZZHC RX 250: Performed by: STUDENT IN AN ORGANIZED HEALTH CARE EDUCATION/TRAINING PROGRAM

## 2017-12-22 PROCEDURE — 37000009 ZZH ANESTHESIA TECHNICAL FEE, EACH ADDTL 15 MIN: Performed by: OBSTETRICS & GYNECOLOGY

## 2017-12-22 PROCEDURE — 27210794 ZZH OR GENERAL SUPPLY STERILE: Performed by: OBSTETRICS & GYNECOLOGY

## 2017-12-22 PROCEDURE — 59510 CESAREAN DELIVERY: CPT | Mod: GC | Performed by: OBSTETRICS & GYNECOLOGY

## 2017-12-22 PROCEDURE — 36000057 ZZH SURGERY LEVEL 3 1ST 30 MIN - UMMC: Performed by: OBSTETRICS & GYNECOLOGY

## 2017-12-22 PROCEDURE — 40000170 ZZH STATISTIC PRE-PROCEDURE ASSESSMENT II: Performed by: OBSTETRICS & GYNECOLOGY

## 2017-12-22 PROCEDURE — T1013 SIGN LANG/ORAL INTERPRETER: HCPCS | Mod: U3

## 2017-12-22 PROCEDURE — 25000128 H RX IP 250 OP 636

## 2017-12-22 PROCEDURE — 25000128 H RX IP 250 OP 636: Performed by: STUDENT IN AN ORGANIZED HEALTH CARE EDUCATION/TRAINING PROGRAM

## 2017-12-22 PROCEDURE — 37000008 ZZH ANESTHESIA TECHNICAL FEE, 1ST 30 MIN: Performed by: OBSTETRICS & GYNECOLOGY

## 2017-12-22 PROCEDURE — 0HB7XZZ EXCISION OF ABDOMEN SKIN, EXTERNAL APPROACH: ICD-10-PCS | Performed by: OBSTETRICS & GYNECOLOGY

## 2017-12-22 PROCEDURE — 12000030 ZZH R&B OB INTERMEDIATE UMMC

## 2017-12-22 PROCEDURE — 36000059 ZZH SURGERY LEVEL 3 EA 15 ADDTL MIN UMMC: Performed by: OBSTETRICS & GYNECOLOGY

## 2017-12-22 PROCEDURE — C9290 INJ, BUPIVACAINE LIPOSOME: HCPCS | Performed by: STUDENT IN AN ORGANIZED HEALTH CARE EDUCATION/TRAINING PROGRAM

## 2017-12-22 PROCEDURE — 25000132 ZZH RX MED GY IP 250 OP 250 PS 637: Performed by: STUDENT IN AN ORGANIZED HEALTH CARE EDUCATION/TRAINING PROGRAM

## 2017-12-22 PROCEDURE — C1765 ADHESION BARRIER: HCPCS | Performed by: OBSTETRICS & GYNECOLOGY

## 2017-12-22 PROCEDURE — 27110028 ZZH OR GENERAL SUPPLY NON-STERILE: Performed by: OBSTETRICS & GYNECOLOGY

## 2017-12-22 PROCEDURE — 71000014 ZZH RECOVERY PHASE 1 LEVEL 2 FIRST HR: Performed by: OBSTETRICS & GYNECOLOGY

## 2017-12-22 RX ORDER — BISACODYL 10 MG
10 SUPPOSITORY, RECTAL RECTAL DAILY PRN
Status: DISCONTINUED | OUTPATIENT
Start: 2017-12-24 | End: 2017-12-25 | Stop reason: HOSPADM

## 2017-12-22 RX ORDER — LIDOCAINE 40 MG/G
CREAM TOPICAL
Status: DISCONTINUED | OUTPATIENT
Start: 2017-12-22 | End: 2017-12-25 | Stop reason: HOSPADM

## 2017-12-22 RX ORDER — DEXTROSE, SODIUM CHLORIDE, SODIUM LACTATE, POTASSIUM CHLORIDE, AND CALCIUM CHLORIDE 5; .6; .31; .03; .02 G/100ML; G/100ML; G/100ML; G/100ML; G/100ML
INJECTION, SOLUTION INTRAVENOUS CONTINUOUS
Status: DISCONTINUED | OUTPATIENT
Start: 2017-12-22 | End: 2017-12-25 | Stop reason: HOSPADM

## 2017-12-22 RX ORDER — CEFAZOLIN SODIUM 2 G/100ML
2 INJECTION, SOLUTION INTRAVENOUS
Status: DISCONTINUED | OUTPATIENT
Start: 2017-12-22 | End: 2017-12-22 | Stop reason: HOSPADM

## 2017-12-22 RX ORDER — KETOROLAC TROMETHAMINE 30 MG/ML
30 INJECTION, SOLUTION INTRAMUSCULAR; INTRAVENOUS EVERY 6 HOURS
Status: COMPLETED | OUTPATIENT
Start: 2017-12-22 | End: 2017-12-23

## 2017-12-22 RX ORDER — NALOXONE HYDROCHLORIDE 0.4 MG/ML
.1-.4 INJECTION, SOLUTION INTRAMUSCULAR; INTRAVENOUS; SUBCUTANEOUS
Status: DISCONTINUED | OUTPATIENT
Start: 2017-12-22 | End: 2017-12-22

## 2017-12-22 RX ORDER — SODIUM CHLORIDE, SODIUM LACTATE, POTASSIUM CHLORIDE, CALCIUM CHLORIDE 600; 310; 30; 20 MG/100ML; MG/100ML; MG/100ML; MG/100ML
INJECTION, SOLUTION INTRAVENOUS
Status: COMPLETED
Start: 2017-12-22 | End: 2017-12-22

## 2017-12-22 RX ORDER — ACETAMINOPHEN 325 MG/1
975 TABLET ORAL EVERY 8 HOURS
Status: DISCONTINUED | OUTPATIENT
Start: 2017-12-22 | End: 2017-12-25 | Stop reason: HOSPADM

## 2017-12-22 RX ORDER — OXYTOCIN/0.9 % SODIUM CHLORIDE 30/500 ML
100 PLASTIC BAG, INJECTION (ML) INTRAVENOUS CONTINUOUS
Status: DISCONTINUED | OUTPATIENT
Start: 2017-12-22 | End: 2017-12-25 | Stop reason: HOSPADM

## 2017-12-22 RX ORDER — SODIUM CHLORIDE, SODIUM LACTATE, POTASSIUM CHLORIDE, CALCIUM CHLORIDE 600; 310; 30; 20 MG/100ML; MG/100ML; MG/100ML; MG/100ML
INJECTION, SOLUTION INTRAVENOUS CONTINUOUS
Status: DISCONTINUED | OUTPATIENT
Start: 2017-12-22 | End: 2017-12-22 | Stop reason: HOSPADM

## 2017-12-22 RX ORDER — MISOPROSTOL 200 UG/1
400 TABLET ORAL
Status: DISCONTINUED | OUTPATIENT
Start: 2017-12-22 | End: 2017-12-25 | Stop reason: HOSPADM

## 2017-12-22 RX ORDER — SIMETHICONE 80 MG
80 TABLET,CHEWABLE ORAL 4 TIMES DAILY PRN
Status: DISCONTINUED | OUTPATIENT
Start: 2017-12-22 | End: 2017-12-25 | Stop reason: HOSPADM

## 2017-12-22 RX ORDER — MORPHINE SULFATE 1 MG/ML
0.2 INJECTION, SOLUTION EPIDURAL; INTRATHECAL; INTRAVENOUS ONCE
Status: DISCONTINUED | OUTPATIENT
Start: 2017-12-22 | End: 2017-12-25 | Stop reason: HOSPADM

## 2017-12-22 RX ORDER — ONDANSETRON 4 MG/1
4 TABLET, ORALLY DISINTEGRATING ORAL EVERY 30 MIN PRN
Status: DISCONTINUED | OUTPATIENT
Start: 2017-12-22 | End: 2017-12-22 | Stop reason: HOSPADM

## 2017-12-22 RX ORDER — FENTANYL CITRATE 50 UG/ML
25-50 INJECTION, SOLUTION INTRAMUSCULAR; INTRAVENOUS
Status: DISCONTINUED | OUTPATIENT
Start: 2017-12-22 | End: 2017-12-22 | Stop reason: HOSPADM

## 2017-12-22 RX ORDER — HYDROCORTISONE 2.5 %
CREAM (GRAM) TOPICAL 3 TIMES DAILY PRN
Status: DISCONTINUED | OUTPATIENT
Start: 2017-12-22 | End: 2017-12-25 | Stop reason: HOSPADM

## 2017-12-22 RX ORDER — OXYCODONE HYDROCHLORIDE 5 MG/1
5-10 TABLET ORAL
Status: DISCONTINUED | OUTPATIENT
Start: 2017-12-22 | End: 2017-12-25 | Stop reason: HOSPADM

## 2017-12-22 RX ORDER — MORPHINE SULFATE 1 MG/ML
INJECTION, SOLUTION EPIDURAL; INTRATHECAL; INTRAVENOUS PRN
Status: DISCONTINUED | OUTPATIENT
Start: 2017-12-22 | End: 2017-12-22

## 2017-12-22 RX ORDER — BUPIVACAINE HYDROCHLORIDE 2.5 MG/ML
INJECTION, SOLUTION EPIDURAL; INFILTRATION; INTRACAUDAL PRN
Status: DISCONTINUED | OUTPATIENT
Start: 2017-12-22 | End: 2017-12-22

## 2017-12-22 RX ORDER — AMOXICILLIN 250 MG
1-2 CAPSULE ORAL 2 TIMES DAILY
Status: DISCONTINUED | OUTPATIENT
Start: 2017-12-22 | End: 2017-12-25 | Stop reason: HOSPADM

## 2017-12-22 RX ORDER — NALOXONE HYDROCHLORIDE 0.4 MG/ML
.1-.4 INJECTION, SOLUTION INTRAMUSCULAR; INTRAVENOUS; SUBCUTANEOUS
Status: CANCELLED | OUTPATIENT
Start: 2017-12-22 | End: 2017-12-23

## 2017-12-22 RX ORDER — NALOXONE HYDROCHLORIDE 0.4 MG/ML
.1-.4 INJECTION, SOLUTION INTRAMUSCULAR; INTRAVENOUS; SUBCUTANEOUS
Status: DISCONTINUED | OUTPATIENT
Start: 2017-12-22 | End: 2017-12-25 | Stop reason: HOSPADM

## 2017-12-22 RX ORDER — OXYTOCIN 10 [USP'U]/ML
10 INJECTION, SOLUTION INTRAMUSCULAR; INTRAVENOUS
Status: DISCONTINUED | OUTPATIENT
Start: 2017-12-22 | End: 2017-12-25 | Stop reason: HOSPADM

## 2017-12-22 RX ORDER — BUPIVACAINE HYDROCHLORIDE 7.5 MG/ML
INJECTION, SOLUTION INTRASPINAL PRN
Status: DISCONTINUED | OUTPATIENT
Start: 2017-12-22 | End: 2017-12-22

## 2017-12-22 RX ORDER — ONDANSETRON 2 MG/ML
4 INJECTION INTRAMUSCULAR; INTRAVENOUS EVERY 30 MIN PRN
Status: DISCONTINUED | OUTPATIENT
Start: 2017-12-22 | End: 2017-12-22 | Stop reason: HOSPADM

## 2017-12-22 RX ORDER — ONDANSETRON 2 MG/ML
4 INJECTION INTRAMUSCULAR; INTRAVENOUS EVERY 6 HOURS PRN
Status: DISCONTINUED | OUTPATIENT
Start: 2017-12-22 | End: 2017-12-25 | Stop reason: HOSPADM

## 2017-12-22 RX ORDER — EPHEDRINE SULFATE 50 MG/ML
INJECTION, SOLUTION INTRAMUSCULAR; INTRAVENOUS; SUBCUTANEOUS PRN
Status: DISCONTINUED | OUTPATIENT
Start: 2017-12-22 | End: 2017-12-22

## 2017-12-22 RX ORDER — CITRIC ACID/SODIUM CITRATE 334-500MG
30 SOLUTION, ORAL ORAL
Status: DISCONTINUED | OUTPATIENT
Start: 2017-12-22 | End: 2017-12-22 | Stop reason: HOSPADM

## 2017-12-22 RX ORDER — SODIUM CHLORIDE, SODIUM LACTATE, POTASSIUM CHLORIDE, CALCIUM CHLORIDE 600; 310; 30; 20 MG/100ML; MG/100ML; MG/100ML; MG/100ML
INJECTION, SOLUTION INTRAVENOUS CONTINUOUS PRN
Status: DISCONTINUED | OUTPATIENT
Start: 2017-12-22 | End: 2017-12-22

## 2017-12-22 RX ORDER — EPHEDRINE SULFATE 50 MG/ML
5 INJECTION, SOLUTION INTRAMUSCULAR; INTRAVENOUS; SUBCUTANEOUS
Status: DISCONTINUED | OUTPATIENT
Start: 2017-12-22 | End: 2017-12-25 | Stop reason: HOSPADM

## 2017-12-22 RX ORDER — LANOLIN 100 %
OINTMENT (GRAM) TOPICAL
Status: DISCONTINUED | OUTPATIENT
Start: 2017-12-22 | End: 2017-12-25 | Stop reason: HOSPADM

## 2017-12-22 RX ORDER — NALBUPHINE HYDROCHLORIDE 10 MG/ML
2.5-5 INJECTION, SOLUTION INTRAMUSCULAR; INTRAVENOUS; SUBCUTANEOUS EVERY 6 HOURS PRN
Status: DISCONTINUED | OUTPATIENT
Start: 2017-12-22 | End: 2017-12-25 | Stop reason: HOSPADM

## 2017-12-22 RX ORDER — LIDOCAINE 40 MG/G
CREAM TOPICAL
Status: DISCONTINUED | OUTPATIENT
Start: 2017-12-22 | End: 2017-12-24

## 2017-12-22 RX ORDER — DIPHENHYDRAMINE HCL 25 MG
25 CAPSULE ORAL EVERY 6 HOURS PRN
Status: DISCONTINUED | OUTPATIENT
Start: 2017-12-22 | End: 2017-12-25 | Stop reason: HOSPADM

## 2017-12-22 RX ORDER — ONDANSETRON 2 MG/ML
INJECTION INTRAMUSCULAR; INTRAVENOUS PRN
Status: DISCONTINUED | OUTPATIENT
Start: 2017-12-22 | End: 2017-12-22

## 2017-12-22 RX ORDER — ACETAMINOPHEN 325 MG/1
650 TABLET ORAL EVERY 4 HOURS PRN
Status: DISCONTINUED | OUTPATIENT
Start: 2017-12-25 | End: 2017-12-25 | Stop reason: HOSPADM

## 2017-12-22 RX ORDER — DIPHENHYDRAMINE HYDROCHLORIDE 50 MG/ML
25 INJECTION INTRAMUSCULAR; INTRAVENOUS EVERY 6 HOURS PRN
Status: DISCONTINUED | OUTPATIENT
Start: 2017-12-22 | End: 2017-12-25 | Stop reason: HOSPADM

## 2017-12-22 RX ORDER — OXYTOCIN/0.9 % SODIUM CHLORIDE 30/500 ML
340 PLASTIC BAG, INJECTION (ML) INTRAVENOUS CONTINUOUS PRN
Status: DISCONTINUED | OUTPATIENT
Start: 2017-12-22 | End: 2017-12-25 | Stop reason: HOSPADM

## 2017-12-22 RX ORDER — CEFAZOLIN SODIUM 1 G
1 VIAL (EA) INJECTION SEE ADMIN INSTRUCTIONS
Status: DISCONTINUED | OUTPATIENT
Start: 2017-12-22 | End: 2017-12-22 | Stop reason: HOSPADM

## 2017-12-22 RX ADMIN — BUPIVACAINE 20 ML: 13.3 INJECTION, SUSPENSION, LIPOSOMAL INFILTRATION at 15:54

## 2017-12-22 RX ADMIN — SODIUM CHLORIDE, POTASSIUM CHLORIDE, SODIUM LACTATE AND CALCIUM CHLORIDE: 600; 310; 30; 20 INJECTION, SOLUTION INTRAVENOUS at 13:48

## 2017-12-22 RX ADMIN — SENNOSIDES AND DOCUSATE SODIUM 1 TABLET: 8.6; 5 TABLET ORAL at 20:42

## 2017-12-22 RX ADMIN — MORPHINE SULFATE 0.2 MG: 1 INJECTION EPIDURAL; INTRATHECAL; INTRAVENOUS at 14:12

## 2017-12-22 RX ADMIN — ACETAMINOPHEN 975 MG: 325 TABLET, FILM COATED ORAL at 20:42

## 2017-12-22 RX ADMIN — BUPIVACAINE HYDROCHLORIDE IN DEXTROSE 1.4 ML: 7.5 INJECTION, SOLUTION SUBARACHNOID at 14:12

## 2017-12-22 RX ADMIN — OXYTOCIN-SODIUM CHLORIDE 0.9% IV SOLN 30 UNIT/500ML 100 ML/HR: 30-0.9/5 SOLUTION at 18:27

## 2017-12-22 RX ADMIN — SODIUM CHLORIDE, POTASSIUM CHLORIDE, SODIUM LACTATE AND CALCIUM CHLORIDE 1000 ML: 600; 310; 30; 20 INJECTION, SOLUTION INTRAVENOUS at 12:08

## 2017-12-22 RX ADMIN — BUPIVACAINE HYDROCHLORIDE 20 ML: 2.5 INJECTION, SOLUTION EPIDURAL; INFILTRATION; INTRACAUDAL at 15:53

## 2017-12-22 RX ADMIN — SODIUM CHLORIDE, SODIUM LACTATE, POTASSIUM CHLORIDE, CALCIUM CHLORIDE AND DEXTROSE MONOHYDRATE: 5; 600; 310; 30; 20 INJECTION, SOLUTION INTRAVENOUS at 23:29

## 2017-12-22 RX ADMIN — ONDANSETRON 4 MG: 2 INJECTION INTRAMUSCULAR; INTRAVENOUS at 14:25

## 2017-12-22 RX ADMIN — PHENYLEPHRINE HYDROCHLORIDE 150 MCG: 10 INJECTION, SOLUTION INTRAMUSCULAR; INTRAVENOUS; SUBCUTANEOUS at 14:13

## 2017-12-22 RX ADMIN — SODIUM CHLORIDE, POTASSIUM CHLORIDE, SODIUM LACTATE AND CALCIUM CHLORIDE 1000 ML: 600; 310; 30; 20 INJECTION, SOLUTION INTRAVENOUS at 11:13

## 2017-12-22 RX ADMIN — Medication 5 MG: at 14:16

## 2017-12-22 RX ADMIN — PHENYLEPHRINE HYDROCHLORIDE 0.2 MCG/KG/MIN: 10 INJECTION, SOLUTION INTRAMUSCULAR; INTRAVENOUS; SUBCUTANEOUS at 14:15

## 2017-12-22 RX ADMIN — Medication 5 MG: at 14:13

## 2017-12-22 RX ADMIN — ONDANSETRON 4 MG: 2 INJECTION INTRAMUSCULAR; INTRAVENOUS at 18:55

## 2017-12-22 RX ADMIN — KETOROLAC TROMETHAMINE 30 MG: 30 INJECTION, SOLUTION INTRAMUSCULAR at 22:43

## 2017-12-22 NOTE — H&P
[facility] Labor and Delivery History and Physical    Ramona Hill MRN# [id number]   Age: 37 year old YOB: 1980              HPI:     Ramona Hill is a 37 year old  at 39w2d by LMP c/w 10w6d US here for scheduled repeat     Patient reports feeling well.  Specifically she denies decreased fetal movement, loss of fluid, vaginal bleeding, or regular contractions.  She denies HA, vision changes, chest pain, shortness of breath, nausea, vomiting, or dysuria.    We have discussed option of  vs repeat  section in the office and she has requested to deliver by repeat  section.  She has declined a PPTL.        OB Problem List:   1.Hx of  x1- arrest of descent          Pregnancy history:     OBSTETRIC HISTORY:    Obstetric History       T1      L1     SAB0   TAB0   Ectopic0   Multiple0   Live Births1       # Outcome Date GA Lbr Christos/2nd Weight Sex Delivery Anes PTL Lv   2 Current            1 Term 16 40w2d  3.204 kg (7 lb 1 oz) F CS-LTranv Gen  ROMELIA      Apgar1:  8                Apgar5: 9          Prenatal Labs:   Lab Results   Component Value Date    ABO O 2017    RH Pos 2017    AS Neg 2017    HEPBANG Nonreactive 2017    CHPCRT  2015     Negative   Negative for C. trachomatis rRNA by transcription mediated amplification.   A negative result by transcription mediated amplification does not preclude the   presence of C. trachomatis infection because results are dependent on proper   and adequate collection, absence of inhibitors, and sufficient rRNA to be   detected.      GCPCRT  2015     Negative   Negative for N. gonorrhoeae rRNA by transcription mediated amplification.   A negative result by transcription mediated amplification does not preclude the   presence of N. gonorrhoeae infection because results are dependent on proper   and adequate collection, absence of inhibitors, and  sufficient rRNA to be   detected.      TREPAB Negative 2017    HGB 12.8 2017       GBS Status:   Lab Results   Component Value Date    GBS Positive (A) 2017       Active Problem List  Patient Active Problem List   Diagnosis     Language barrier     S/P  section     Postpartum anxiety     Need for Tdap vaccination     Previous  delivery, antepartum condition or complication     High-risk pregnancy, young multigravida, unspecified trimester     High-risk pregnancy, young multigravida, third trimester     GBS (group B Streptococcus carrier), +RV culture, currently pregnant       Medication Prior to Admission  No prescriptions prior to admission.           Maternal Past Medical History:     Past Medical History:   Diagnosis Date     NO ACTIVE PROBLEMS                        Family History:     Family History   Problem Relation Age of Onset     Family History Negative No family hx of             Social History:     Social History     Social History     Marital status: Single     Spouse name: N/A     Number of children: N/A     Years of education: N/A     Occupational History     Not on file.     Social History Main Topics     Smoking status: Never Smoker     Smokeless tobacco: Never Used     Alcohol use No     Drug use: No     Sexual activity: Yes     Partners: Male     Birth control/ protection: None      Comment: partners x1 year     Other Topics Concern     Not on file     Social History Narrative    Caffeine intake/servings daily - 0    Calcium intake/servings daily - 3    Exercise 0  times weekly - describe ;     Sunscreen used - No    Seatbelts used - Yes    Guns stored in the home - No    Self Breast Exam - No    Pap test up to date -  No    Eye exam up to date -  No    Dental exam up to date -  No    DEXA scan up to date -  No    Flex Sig/Colonoscopy up to date -  No    Mammography up to date -  No    Immunizations reviewed and up to date - Yes    Abuse: Current or Past  (Physical, Sexual or Emotional) - No    Do you feel safe in your environment - Yes    Do you cope well with stress - Yes    Do you suffer from insomnia - No    Last updated by: Sonia Regalado  2015                    Review of Systems:   Negative except as noted above in the HPI         Physical Exam:   AFVSS  Gen: Alert and oriented in NAD  Cardio: RRR  Resp: CTAB   Abdomen: gravid, soft, nontender. EFW 3000g  Cervix:  deferred  Presentation: cephalic by leopold's  Fetal Heart Rate Tracin, moderate variability, accels present, one late decel  Tocometer:rare random ctx's    Imaging:    Fetal Anatomy Survey   GA: 20+-0      Single IUP, Fetal Anatomy WNL, 3 VC, Placenta: posterior                Assessment/plan:   Ramona Hill is a 37 year old  at 39w1d by LMP c/w 10w6d US admitted for scheduled repeat .    # Repeat   - Admit to labor and delivery, preop labs completed yesterday  - reviewed and signed the consent, answered questions.    # FWB:  reactive NST  posterior placenta; EFW 3000g  Cephalic by BSUS    # PNC: Rh pos, RI     Katie Marquez, PGY2  OB/Gyn  2017, 8:07 PM    Attending Staff  I have seen and examined the patient separately from the resident.  I agree with the above note.    Fetal status is reassuring today with reactive nst.    I have reviewed the R/B/A of the procedure and patient has given informed consent.     Zo Fox MD

## 2017-12-22 NOTE — ANESTHESIA POSTPROCEDURE EVALUATION
Patient: Ramona Hill    Procedure(s):  Repeat  Section  - Wound Class: II-Clean Contaminated    Diagnosis:Previous Cearean Section  Diagnosis Additional Information: No value filed.    Anesthesia Type:  Spinal, Periph. Nerve Block for postop pain, MAC    Note:  Anesthesia Post Evaluation    Patient location during evaluation: PACU  Patient participation: Able to fully participate in evaluation  Level of consciousness: awake and alert  Pain management: adequate  Airway patency: patent  Cardiovascular status: acceptable  Respiratory status: acceptable  Hydration status: acceptable  PONV: none     Anesthetic complications: None          Last vitals:  Vitals:    17 1632 17 1645 17 1700   BP: 100/64 101/62 98/63   Resp: 30 23 16   Temp:      SpO2: 97% 96% 95%         Electronically Signed By: Marie Robles MD  2017  5:50 PM

## 2017-12-22 NOTE — ANESTHESIA PROCEDURE NOTES
Spinal/LP Procedure Note    Spinal Block  Staff:     Anesthesiologist:  ASAEL MARIANO    Resident/CRNA:  SHELLEY CHATTERJEE    Spinal/LP performed by resident/CRNA in presence of a teaching physician.    Location: In OR BEFORE Induction  Procedure Start/Stop Times:      12/22/2017 2:03 PM     12/22/2017 2:14 PM    patient identified, IV checked, site marked, risks and benefits discussed, informed consent, monitors and equipment checked, pre-op evaluation, at physician/surgeon's request and post-op pain management      Correct Patient: Yes      Correct Position: Yes      Correct Site: Yes      Correct Procedure: Yes      Correct Laterality:  Yes    Site Marked:  Yes  Procedure:     Procedure:  Intrathecal    ASA:  2    Position:  Sitting    Sterile Prep: chloraprep      Insertion site:  L3-4    Approach:  Midline    Needle gauge (G):  25    Local Skin Infiltration:  1% lidocaine    amount (ml):  3    Needle Length (in):  3.5    Introducer used: Yes      Introducer gauge:  20 G    Attempts:  3    Redirects:  1    CSF:  Clear    Paresthesias:  No    Time injected:  14:12  Assessment/Narrative:     Sensory Level:  T4     Patient appears to have small interspace (prior C/S with difficulty with spinal as well). Initial attempt by resident, following attempt by attending with passage through interspace between L3/4.

## 2017-12-22 NOTE — BRIEF OP NOTE
Brief Operative Note   Name: Ramona Hill  MRN: [id number]  : 1980  Date of Surgery: 2017    Pre-operative Diagnosis:    1. IUP at 39w2d    2. Hx of prior   Post-operative Diagnosis: Same  Procedure(s): repeat low transverse , scar revision    Surgeon: Zo Fox MD   Assistants:  Katie Marquez PGY-2    Anesthesia: Spinal   QBL: pending mL   Urine Output: 250 mL clear urine   Fluids: 1600 mL crystalloid     Specimens: none  Complications: None apparent.  Findings:  1. Keloid scar  2. Dense rectus fascia from prior , minimal intra-abdominal adhesions  3. Clear amniotic fluid  4. Liveborn female  infant in TORI presentation. Apgars 9 at 1 minute & 9 at 5 minutes. Weight pending.  5. Normal uterus, fallopian tubes, and ovaries.    Katie Marquez  2017, 3:35 PM      I was present for the entire procedure and agree with the details as noted above. Operative report has been dictated with job ID # [id number]  Zo Fox MD

## 2017-12-22 NOTE — ANESTHESIA PROCEDURE NOTES
Peripheral Nerve Block Procedure Note    Staff:     Anesthesiologist:  ASAEL MARIANO    Resident/CRNA:  SHELLEY CHATTERJEE    Block performed by resident/CRNA in the presence of a teaching physician    Location: OR AFTER induction  Procedure Start/Stop TImes:      12/22/2017 5:45 PM     12/22/2017 5:55 PM    patient identified, IV checked, site marked, risks and benefits discussed, informed consent, monitors and equipment checked, pre-op evaluation, at physician/surgeon's request and post-op pain management      Correct Patient: Yes      Correct Position: Yes      Correct Site: Yes      Correct Procedure: Yes      Correct Laterality:  Yes    Site Marked:  Yes  Procedure details:     Procedure:  TAP    ASA:  2    Laterality:  Bilateral    Position:  Supine    Sterile Prep: chloraprep      Local skin infiltration:  None    Needle:  Short bevel    Needle length (mm):  110    Ultrasound: Yes      Ultrasound used to identify targeted nerve, plexus, or vascular structure and placed a needle adjacent to it      A permanent image is NOT entered into the patient's record.      Abnormal pain on injection: No      Blood Aspirated: No      Paresthesias:  No    Bleeding at site: No      Bolus via:  Needle    Infusion Method:  Single Shot    Blood aspirated via catheter: No      Complications:  None  Assessment/Narrative:     Injection made incrementally with aspirations every (mL):  3

## 2017-12-22 NOTE — IP AVS SNAPSHOT
[facility]    [address] [address]    Phone:  [phone]                                       After Visit Summary   12/22/2017    Ramona Hill    MRN: [id number]           After Visit Summary Signature Page     I have received my discharge instructions, and my questions have been answered. I have discussed any challenges I see with this plan with the nurse or doctor.    ..........................................................................................................................................  Patient/Patient Representative Signature      ..........................................................................................................................................  Patient Representative Print Name and Relationship to Patient    ..................................................               ................................................  Date                                            Time    ..........................................................................................................................................  Reviewed by Signature/Title    ...................................................              ..............................................  Date                                                            Time

## 2017-12-22 NOTE — ANESTHESIA CARE TRANSFER NOTE
Patient: Ramona Hill    Procedure(s):  Repeat  Section  - Wound Class: II-Clean Contaminated    Diagnosis: Previous Cearean Section  Diagnosis Additional Information: No value filed.    Anesthesia Type:   Spinal, Periph. Nerve Block for postop pain, MAC     Note:  Airway :Room Air  Patient transferred to:PACU  Comments: Patient maintained on room air at end of case, VSS and respiratory status adequate. [facility] and into PACU in L&D without incident. Stable VSS on transfer, report given to RN and care transfer complete.Handoff Report: Identifed the Patient, Identified the Reponsible Provider, Reviewed the pertinent medical history, Discussed the surgical course, Reviewed Intra-OP anesthesia mangement and issues during anesthesia, Set expectations for post-procedure period and Allowed opportunity for questions and acknowledgement of understanding      Vitals: (Last set prior to Anesthesia Care Transfer)    CRNA VITALS  2017 1527 - 2017 1603      2017             Pulse: 66    SpO2: 98 %                Electronically Signed By: Jesus Manuel Jamil MD  2017  4:03 PM

## 2017-12-22 NOTE — IP AVS SNAPSHOT
MRN:[id number]                      After Visit Summary   2017    Ramona Hill    MRN: [id number]           Thank you!     Thank you for choosing [facility] for your care. Our goal is always to provide you with excellent care. Hearing back from our patients is one way we can continue to improve our services. Please take a few minutes to complete the written survey that you may receive in the mail after you visit with us. Thank you!        Patient Information     Date Of Birth          1980        About your hospital stay     You were [facility] on:  [facility]    You were discharged on:  2017        Reason for your hospital stay       Maternity care                  Who to Call     For medical emergencies, please call 911.  For non-urgent questions about your medical care, please call your primary care provider or clinic, [phone]  For questions related to your surgery, please call your surgery clinic        Attending Provider     Provider Zo Lake MD OB/Gyn       Primary Care Provider Office Phone # Fax #    Annelise Figueroa [phone]      After Care Instructions     Activity       Review discharge instructions            Diet       Resume previous diet            Discharge Instructions - Postpartum visit       Schedule postpartum visit with your provider and return to clinic in 6 weeks.                  Follow-up Appointments     Follow Up and recommended labs and tests       S/p delivery                  Further instructions from your care team       Postop  Birth Instructions    Activity       Do not lift more than 10 pounds for 6 weeks after surgery.  Ask family and friends for help when you need it.    No driving until you have stopped taking your pain medications (usually two weeks after surgery).    No heavy exercise or activity for 6 weeks.  Don't do anything  that will put a strain on your surgery site.    Don't strain when using the toilet.  Your care team may prescribe a stool softener if you have problems with your bowel movements.     To care for your incision:       Keep the incision clean and dry.    Do not soak your incision in water. No swimming or hot tubs until it has fully healed. You may soak in the bathtub if the water level is below your incision.    Do not use peroxide, gel, cream, lotion, or ointment on your incision.    Adjust your clothes to avoid pressure on your surgery site (check the elastic in your underwear for example).     You may see a small amount of clear or pink drainage and this is normal.  Check with your health care provider:       If the drainage increases or has an odor.    If the incision reddens, you have swelling, or develop a rash.    If you have increased pain and the medicine we prescribed doesn't help.    If you have a fever above 100.4 F (38 C) with or without chills when placing thermometer under your tongue.   The area around your incision (surgery wound), will feel numb.  This is normal. The numbness should go away in less than a year.     Keep your hands clean:  Always wash your hands before touching your incision (surgery wound). This helps reduce your risk of infection. If your hands aren't dirty, you may use an alcohol hand-rub to clean your hands. Keep your nails clean and short.    Call your healthcare provider if you have any of these symptoms:       You soak a sanitary pad with blood within 1 hour, or you see blood clots larger than a golf ball.    Bleeding that lasts more than 6 weeks.    Vaginal discharge that smells bad.    Severe pain, cramping or tenderness in your lower belly area.    A need to urinate more frequently (use the toilet more often), more urgently (use the toilet very quickly), or it burns when you urinate.    Nausea and vomiting.    Redness, swelling or pain around a vein in your leg.    Problems  "breastfeeding or a red or painful area on your breast.    Chest pain and cough or are gasping for air.    Problems with coping with sadness, anxiety or depression. If you have concerns about hurting yourself or the baby, call your provider immediately.      You have questions or concerns after you return home.                  Pending Results     No orders found from 12/20/2017 to 12/23/2017.            Statement of Approval     Ordered          12/25/17 0841  I have reviewed and agree with all the recommendations and orders detailed in this document.  EFFECTIVE NOW     Approved and electronically signed by:  Alida Willis MD           12/24/17 0956  I have reviewed and agree with all the recommendations and orders detailed in this document.  EFFECTIVE NOW     Approved and electronically signed by:  Zo Fox MD             Admission Information     Date & Time Provider Department Dept. Phone    12/22/2017 Zo Fox MD [facility] [phone]      Your Vitals Were     Blood Pressure Pulse Temperature Respirations Height Weight    105/72 93 98.6  F (37  C) (Oral) 16 1.55 m (5' 1.02\") 59 kg (130 lb)    Last Period Pulse Oximetry BMI (Body Mass Index)             03/22/2017 97% 24.54 kg/m2         [employer] Information     [employer] gives you secure access to your electronic health record. If you see a primary care provider, you can also send messages to your care team and make appointments. If you have questions, please call your primary care clinic.  If you do not have a primary care provider, please call [phone] and they will assist you.        Care EveryWhere ID     This is your Care EveryWhere ID. This could be used by other organizations to access your [facility] medical records  [id number]        Equal Access to Services     CASTRO PALMA : terra Patel qaybta kaalmada adeegyada, waxay idiin hayaan adeeg kharash la'aan ah. So [facility] " [phone].    ATENCIÓN: Si favio pearson, tiene a soto disposición servicios gratuitos de asistencia lingüística. Peter butcher [phone].    We comply with applicable federal civil rights laws and [place] laws. We do not discriminate on the basis of race, color, national origin, age, disability, sex, sexual orientation, or gender identity.               Review of your medicines      START taking        Dose / Directions    acetaminophen 325 MG tablet   Commonly known as:  TYLENOL   Used for:  S/P  section        Dose:  650 mg   Take 2 tablets (650 mg) by mouth every 4 hours as needed for other (surgical pain)   Quantity:  60 tablet   Refills:  3       ferrous sulfate 325 (65 FE) MG tablet   Commonly known as:  IRON   Used for:  S/P  section        Dose:  325 mg   Take 1 tablet (325 mg) by mouth daily (with breakfast)   Quantity:  30 tablet   Refills:  1       ibuprofen 800 MG tablet   Commonly known as:  ADVIL/MOTRIN   Used for:  S/P  section        Dose:  800 mg   Take 1 tablet (800 mg) by mouth every 6 hours as needed for moderate pain   Quantity:  60 tablet   Refills:  0       oxyCODONE IR 5 MG tablet   Commonly known as:  ROXICODONE   Used for:  S/P  section        Dose:  5-10 mg   Take 1-2 tablets (5-10 mg) by mouth every 3 hours as needed for moderate to severe pain   Quantity:  25 tablet   Refills:  0       senna-docusate 8.6-50 MG per tablet   Commonly known as:  SENOKOT-S;PERICOLACE   Used for:  S/P  section        Dose:  1-2 tablet   Take 1-2 tablets by mouth 2 times daily   Quantity:  60 tablet   Refills:  3         CONTINUE these medicines which have NOT CHANGED        Dose / Directions    prenatal multivitamin plus iron 27-0.8 MG Tabs per tablet   Indication:  Pregnancy   Used for:  Encounter for supervision of other normal pregnancy in first trimester        Dose:  1 tablet   Take 1 tablet by mouth daily   Quantity:  100 tablet   Refills:  3            Where to  get your medicines      These medications were sent to [facility] [place], [place] - [address]  [address], [facility] [address]     Phone:  [phone]     acetaminophen 325 MG tablet    ferrous sulfate 325 (65 FE) MG tablet    ibuprofen 800 MG tablet    senna-docusate 8.6-50 MG per tablet         Some of these will need a paper prescription and others can be bought over the counter. Ask your nurse if you have questions.     Bring a paper prescription for each of these medications     oxyCODONE IR 5 MG tablet                Protect others around you: Learn how to safely use, store and throw away your medicines at www.disposemymeds.org.             Medication List: This is a list of all your medications and when to take them. Check marks below indicate your daily home schedule. Keep this list as a reference.      Medications           Morning Afternoon Evening Bedtime As Needed    acetaminophen 325 MG tablet   Commonly known as:  TYLENOL   Take 2 tablets (650 mg) by mouth every 4 hours as needed for other (surgical pain)   Last time this was given:  975 mg on 12/25/2017  6:12 AM                                ferrous sulfate 325 (65 FE) MG tablet   Commonly known as:  IRON   Take 1 tablet (325 mg) by mouth daily (with breakfast)                                ibuprofen 800 MG tablet   Commonly known as:  ADVIL/MOTRIN   Take 1 tablet (800 mg) by mouth every 6 hours as needed for moderate pain   Last time this was given:  800 mg on 12/25/2017  8:40 AM                                oxyCODONE IR 5 MG tablet   Commonly known as:  ROXICODONE   Take 1-2 tablets (5-10 mg) by mouth every 3 hours as needed for moderate to severe pain   Last time this was given:  5 mg on 12/25/2017  6:13 AM                                prenatal multivitamin plus iron 27-0.8 MG Tabs per tablet   Take 1 tablet by mouth daily                                senna-docusate 8.6-50 MG per tablet   Commonly known  as:  SENOKOT-S;PERICOLACE   Take 1-2 tablets by mouth 2 times daily   Last time this was given:  1 tablet on 12/25/2017  8:40 AM

## 2017-12-23 LAB — HGB BLD-MCNC: 9.4 G/DL (ref 11.7–15.7)

## 2017-12-23 PROCEDURE — 85018 HEMOGLOBIN: CPT | Performed by: STUDENT IN AN ORGANIZED HEALTH CARE EDUCATION/TRAINING PROGRAM

## 2017-12-23 PROCEDURE — 12000030 ZZH R&B OB INTERMEDIATE UMMC

## 2017-12-23 PROCEDURE — 36415 COLL VENOUS BLD VENIPUNCTURE: CPT | Performed by: STUDENT IN AN ORGANIZED HEALTH CARE EDUCATION/TRAINING PROGRAM

## 2017-12-23 PROCEDURE — 25000132 ZZH RX MED GY IP 250 OP 250 PS 637: Performed by: STUDENT IN AN ORGANIZED HEALTH CARE EDUCATION/TRAINING PROGRAM

## 2017-12-23 PROCEDURE — 25000128 H RX IP 250 OP 636: Performed by: STUDENT IN AN ORGANIZED HEALTH CARE EDUCATION/TRAINING PROGRAM

## 2017-12-23 RX ORDER — IBUPROFEN 800 MG/1
800 TABLET, FILM COATED ORAL EVERY 6 HOURS PRN
Status: DISCONTINUED | OUTPATIENT
Start: 2017-12-23 | End: 2017-12-25 | Stop reason: HOSPADM

## 2017-12-23 RX ADMIN — SENNOSIDES AND DOCUSATE SODIUM 1 TABLET: 8.6; 5 TABLET ORAL at 11:50

## 2017-12-23 RX ADMIN — KETOROLAC TROMETHAMINE 30 MG: 30 INJECTION, SOLUTION INTRAMUSCULAR at 11:50

## 2017-12-23 RX ADMIN — OXYCODONE HYDROCHLORIDE 5 MG: 5 TABLET ORAL at 20:14

## 2017-12-23 RX ADMIN — KETOROLAC TROMETHAMINE 30 MG: 30 INJECTION, SOLUTION INTRAMUSCULAR at 05:14

## 2017-12-23 RX ADMIN — ACETAMINOPHEN 975 MG: 325 TABLET, FILM COATED ORAL at 05:14

## 2017-12-23 RX ADMIN — KETOROLAC TROMETHAMINE 30 MG: 30 INJECTION, SOLUTION INTRAMUSCULAR at 18:18

## 2017-12-23 RX ADMIN — ACETAMINOPHEN 975 MG: 325 TABLET, FILM COATED ORAL at 22:01

## 2017-12-23 RX ADMIN — SENNOSIDES AND DOCUSATE SODIUM 2 TABLET: 8.6; 5 TABLET ORAL at 19:31

## 2017-12-23 RX ADMIN — ACETAMINOPHEN 975 MG: 325 TABLET, FILM COATED ORAL at 13:36

## 2017-12-23 NOTE — PLAN OF CARE
Problem: Postpartum ( Delivery) (Adult,Obstetrics,Pediatric)  Goal: Signs and Symptoms of Listed Potential Problems Will be Absent, Minimized or Managed (Postpartum)  Signs and symptoms of listed potential problems will be absent, minimized or managed by discharge/transition of care (reference Postpartum ( Delivery) (Adult,Obstetrics,Pediatric) CPG).   Postpartum stable. PIV infusing pitocin. Pain is negligible. Ambulating independently, denies sob, lightheaded and dizziness. Ibanez is patent and draining. Tolerating liquids. Abd binder in place. Bonding well with baby. Breastfeeding with min assist from staff.

## 2017-12-23 NOTE — PLAN OF CARE
Patient arrived to [facility] unit via zoom cart at 1800,with belongings, accompanied by spouse, family, Michelle ,RN, with infant in arms. Received report from Michelle and checked bands. Unit and room orientation completd. Call light given; no concerns present at this time. Continue with plan of care.

## 2017-12-23 NOTE — PLAN OF CARE
Problem: Patient Care Overview  Goal: Plan of Care/Patient Progress Review  Outcome: Therapy, progress toward functional goals as expected  Data: Vital signs within normal limits ex hypotension w/o symptoms--provider notified. Postpartum checks within normal limits - see flow record. Patient eating and drinking normally. Patient I/O adequate. Ibanez removed at 0345, D5LR stopped at this time. No apparent signs of infection. Incision UTV. Patient performing self cares and is able to care for infant. Breastfeeding infant mostly independently; using some help with positioning and deep latch at times. Patient is ambulating well.   Action: Patient managed with Tylenol and Toradol.   Response: Positive attachment behaviors observed with infant.  present overnight.   Plan: continue plan of care.

## 2017-12-23 NOTE — DISCHARGE SUMMARY
[facility] Discharge Summary    Ramona Hill MRN# [id number]   Age: 37 year old YOB: 1980     Date of Admission:  2017  Date of Discharge:  2017  Admitting Physician:  Zo Fox MD  Discharge Physician:  Zo Fox MD    Admit Dx:   - Intrauterine pregnancy at 39w2d  - hx of prior     Discharge Dx:  - Same as above, s/p repeat low transverse  section    Procedures:  - repeat low transverse  section with double layer uterine closure via Pfannenstiel incision  - scar revision  - Spinal analgesia  - TAP    Admit HPI:  Ramona Hill is a 37 year old  at 39w2d by LMP c/w 10w6d US who presented for scheduled repeat   Please see her admit H&P for full details of her PMH, PSH, Meds, Allergies and exam on admit.    Operative Course:  Surgery was uncomplicated. QBL from the delivery was 798cc. Please see her  Section Operative Note for full details regarding her delivery.    Operative Findings:   1. Keloid scar  2. Dense rectus fascia from prior , minimal intra-abdominal adhesions  3. Clear amniotic fluid  4. Liveborn female  infant in TORI presentation. Apgars 9 at 1 minute & 9 at 5 minutes. Weight pending.  5. Normal uterus, fallopian tubes, and ovaries.       Postoperative Course:  Her postoperative course was uncomplicated. On POD32, she was meeting all of her postpartum goals and deemed stable for discharge. She was voiding without difficulty, tolerating a regular diet without nausea and vomiting, her pain was well controlled on oral pain medicines and her lochia was appropriate. Her hemoglobin prior to delivery was 12.8 and after delivery was 9.4. Her Rh status was positive and Rhogam was not indicated.    Discharge Medications:  Current Discharge Medication List      START taking these medications    Details   oxyCODONE IR (ROXICODONE) 5 MG tablet Take 1-2 tablets (5-10 mg)  by mouth every 3 hours as needed for moderate to severe pain  Qty: 25 tablet, Refills: 0    Associated Diagnoses: S/P  section      acetaminophen (TYLENOL) 325 MG tablet Take 2 tablets (650 mg) by mouth every 4 hours as needed for other (surgical pain)  Qty: 60 tablet, Refills: 3    Associated Diagnoses: S/P  section      ibuprofen (ADVIL/MOTRIN) 800 MG tablet Take 1 tablet (800 mg) by mouth every 6 hours as needed for moderate pain  Qty: 60 tablet, Refills: 0    Associated Diagnoses: S/P  section      senna-docusate (SENOKOT-S;PERICOLACE) 8.6-50 MG per tablet Take 1-2 tablets by mouth 2 times daily  Qty: 60 tablet, Refills: 3    Associated Diagnoses: S/P  section      ferrous sulfate (IRON) 325 (65 FE) MG tablet Take 1 tablet (325 mg) by mouth daily (with breakfast)  Qty: 30 tablet, Refills: 1    Associated Diagnoses: S/P  section         CONTINUE these medications which have NOT CHANGED    Details   Prenatal Vit-Fe Fumarate-FA (PRENATAL MULTIVITAMIN PLUS IRON) 27-0.8 MG TABS per tablet Take 1 tablet by mouth daily  Qty: 100 tablet, Refills: 3    Associated Diagnoses: Encounter for supervision of other normal pregnancy in first trimester               Discharge/Disposition:  Ramona Hill was discharged to home in stable condition with the following instructions/medications:  1) Call for temperature > 100.4, bright red vaginal bleeding >1 pad an hour x 2 hours, foul smelling vaginal discharge, pain not controlled by usual oral pain meds, persistent nausea and vomiting not controlled on medications, drainage or redness from incision site  2) Declined contraception.  3) For feeding she decided to breastfeed.  4) She was instructed to follow-up with her primary OB in 6 weeks for a routine postpartum visit.  5) Discharge activity:  No heavy lifting >15 lbs or strenuous activity for 6 weeks, pelvic rest for 6 weeks, no driving or operating machinery while on  narcotics.      Elana Macdonald PGY3  12/25/2017 7:27 AM

## 2017-12-23 NOTE — PROVIDER NOTIFICATION
12/23/17 0910   Provider Notification   Provider Name/Title G3   Method of Notification Electronic Page   Request Evaluate-Remote   Notification Reason Vital Signs Change  (low B/P. other vitals wnl. )

## 2017-12-23 NOTE — PROGRESS NOTES
"Postpartum Progress Note  Ramona Bran [facility]  [phone]    Subjective:   Patient is feeling well.  Lochia minimal. Eating and drinking regular food without nausea/emesis. Has not ambulated much yet. Pain is adequately controlled. Breastfeeding without concerns/questions.      Objective:  BP 99/65  Pulse 108  Temp 97.5  F (36.4  C) (Oral)  Resp 16  Ht 1.55 m (5' 1.02\")  Wt 59 kg (130 lb)  LMP 2017  SpO2 98%  Breastfeeding? Unknown  BMI 24.54 kg/m2    I/O last 3 completed shifts:  In: 400 [I.V.:400]  Out: -     General: lying in bed, NAD, comfortable  Heart/lungs: no increased work of breathing, well perfused  Abdomen: Soft, non-tender, non-distended; fundus firm and below umbilicus  Incision:  Bandage clean and dry  Extremities: trace edema in BLE     Assessment/Plan: 37 year old  who is POD#1 s/p RLTCS Currently stable and doing well.    - Routine post-partum cares.     - Heme:    Hgb 12.8 > PDX155> pending  - Baby:     in room doing well  - Contraception:   declines  - Rh +  - Rubella immune    Dispo: pending postop goals     Elana Macdonald PGY3  2017 8:17 AM     Patient evaluated and examined by me.  Agree with resident note.  She hopes for discharge tomorrow.      Tammie Colon MD   "

## 2017-12-23 NOTE — PROVIDER NOTIFICATION
12/23/17 0502   Provider Notification   Provider Name/Title Renae   Method of Notification Electronic Page   Notification Reason Vital Signs Change   FYI pt's last two BPs have been slightly hypotensive; 89/62 and 88/55. It appears that pt's BPs tend to run on the lower end. Pt's output has been adequate. Pt denies dizziness or lightheadedness, ambulating w/o difficulty.

## 2017-12-23 NOTE — PLAN OF CARE
Problem: Postpartum ( Delivery) (Adult,Obstetrics,Pediatric)  Goal: Signs and Symptoms of Listed Potential Problems Will be Absent, Minimized or Managed (Postpartum)  Signs and symptoms of listed potential problems will be absent, minimized or managed by discharge/transition of care (reference Postpartum ( Delivery) (Adult,Obstetrics,Pediatric) CPG).   Outcome: Therapy, progress toward functional goals as expected  Data: Ramona Hill transferred to [facility] via cart at 1800. Baby transferred via parent's arms.  Action: Receiving unit notified of transfer: Yes. Patient and family notified of room change. Report given to Ramona VILLA at 1800. Belongings sent to receiving unit. Accompanied by Registered Nurse. Oriented patient to surroundings. Call light within reach. ID bands double-checked with receiving RN.  Response: Patient tolerated transfer and is stable.

## 2017-12-24 PROCEDURE — 25000132 ZZH RX MED GY IP 250 OP 250 PS 637: Performed by: STUDENT IN AN ORGANIZED HEALTH CARE EDUCATION/TRAINING PROGRAM

## 2017-12-24 PROCEDURE — 12000030 ZZH R&B OB INTERMEDIATE UMMC

## 2017-12-24 PROCEDURE — 25000132 ZZH RX MED GY IP 250 OP 250 PS 637: Performed by: OBSTETRICS & GYNECOLOGY

## 2017-12-24 RX ORDER — AMOXICILLIN 250 MG
1-2 CAPSULE ORAL 2 TIMES DAILY
Qty: 60 TABLET | Refills: 3 | Status: SHIPPED | OUTPATIENT
Start: 2017-12-24 | End: 2018-02-19

## 2017-12-24 RX ORDER — OXYCODONE HYDROCHLORIDE 5 MG/1
5-10 TABLET ORAL
Qty: 25 TABLET | Refills: 0 | Status: SHIPPED | OUTPATIENT
Start: 2017-12-24 | End: 2018-02-19

## 2017-12-24 RX ORDER — ACETAMINOPHEN 325 MG/1
650 TABLET ORAL EVERY 4 HOURS PRN
Qty: 60 TABLET | Refills: 3 | Status: SHIPPED | OUTPATIENT
Start: 2017-12-25 | End: 2018-02-19

## 2017-12-24 RX ORDER — IBUPROFEN 800 MG/1
800 TABLET, FILM COATED ORAL EVERY 6 HOURS PRN
Qty: 60 TABLET | Refills: 0 | Status: SHIPPED | OUTPATIENT
Start: 2017-12-24 | End: 2018-02-19

## 2017-12-24 RX ORDER — FERROUS SULFATE 325(65) MG
325 TABLET ORAL
Qty: 30 TABLET | Refills: 1 | Status: SHIPPED | OUTPATIENT
Start: 2017-12-24 | End: 2018-04-24

## 2017-12-24 RX ADMIN — ACETAMINOPHEN 975 MG: 325 TABLET, FILM COATED ORAL at 05:58

## 2017-12-24 RX ADMIN — OXYCODONE HYDROCHLORIDE 5 MG: 5 TABLET ORAL at 14:25

## 2017-12-24 RX ADMIN — IBUPROFEN 800 MG: 800 TABLET, FILM COATED ORAL at 03:21

## 2017-12-24 RX ADMIN — OXYCODONE HYDROCHLORIDE 5 MG: 5 TABLET ORAL at 09:48

## 2017-12-24 RX ADMIN — IBUPROFEN 800 MG: 800 TABLET, FILM COATED ORAL at 15:46

## 2017-12-24 RX ADMIN — OXYCODONE HYDROCHLORIDE 10 MG: 5 TABLET ORAL at 03:20

## 2017-12-24 RX ADMIN — ACETAMINOPHEN 975 MG: 325 TABLET, FILM COATED ORAL at 14:25

## 2017-12-24 RX ADMIN — OXYCODONE HYDROCHLORIDE 5 MG: 5 TABLET ORAL at 21:55

## 2017-12-24 RX ADMIN — ACETAMINOPHEN 975 MG: 325 TABLET, FILM COATED ORAL at 22:38

## 2017-12-24 RX ADMIN — IBUPROFEN 800 MG: 800 TABLET, FILM COATED ORAL at 21:56

## 2017-12-24 RX ADMIN — IBUPROFEN 800 MG: 800 TABLET, FILM COATED ORAL at 09:48

## 2017-12-24 NOTE — PLAN OF CARE
Problem: Postpartum ( Delivery) (Adult,Obstetrics,Pediatric)  Goal: Signs and Symptoms of Listed Potential Problems Will be Absent, Minimized or Managed (Postpartum)  Signs and symptoms of listed potential problems will be absent, minimized or managed by discharge/transition of care (reference Postpartum ( Delivery) (Adult,Obstetrics,Pediatric) CPG).   Postpartum stable. Fundus is firm. Bleeding is scant. Ambulating independently. VOiding adequate amounts. Tolerating diet. Incision dressing is c/d/i. PIV removed. Pain is tolerable with tylenol, toradol. Breastfeeding is going well.  had 8.2% weight loss, taught mom to hand express and pump for supplementing feeds.  is in the room for support. Continue plan of care.

## 2017-12-24 NOTE — PROGRESS NOTES
Post Partum Progress Note    Subjective:  She is resting comfortably in bed this morning.  No specific concerns today. Pain is improving and well controlled on current medication regimen. Tolerating PO intake.  Lochia present and minimal.  Voiding without difficulty.  Passing flatus. Ambulating without dizziness or difficulty.  She denies headache, changes in vision, nausea/vomiting, chest pain, shortness of breath, RUQ pain, or worsening edema.  Plans to breastfeed.    Objective:  Vitals:    17 0745 17 1150 17 1600 17 2323   BP: (!) 83/56 105/63 98/63 95/63   Cuff Size:       Pulse: 79      Resp: 16 16 16 18   Temp: 98.1  F (36.7  C) 98.2  F (36.8  C) 98.2  F (36.8  C) 97.8  F (36.6  C)   TempSrc: Oral Oral Oral Oral   SpO2: 99% 99%     Weight:       Height:           General: NAD. A&Ox3.  CV: RRR.  Pulm: CTAB. Normal respiratory effort.  Abd: Soft, non-tender, non-distended. Fundus is firm and below the umbilicus.  Incision c/d/i.   Ext: trace edema. No calf tenderness.    Assessment/Plan:  Ramona Hill is a 37 year old  female who is POD#2 s/p RLTCS. Stable in the post-operative setting.    - Encourage routine post-operative goals including ambulation and incentive spirometry  - PNC: Rh positive. Rubella immune. No intervention indicated.  - Pain: controlled on oral medications  - Heme: Hgb 12.8> > 9.4. Will discharge home with iron  - GI: continue anti-emetics and stool softeners as needed.  - : Voiding spontaneously.  - Infant: Stable in room with mom  - Feeding: Plans on breastfeeding.  - BC: Declines    Discharge to home on POD#2 or 3    Yamilka Romero MD  OB/GYN Resident, PGY-3  2017 8:15 AM       Physician Attestation   I, Zo Fox, personally examined and evaluated this patient.  I discussed the patient with the resident and care team, and agree with the assessment and plan of care as documented in the resident s note of 2017  [date].      I personally reviewed vital signs, medications and labs.    Key findings: patient doing well, but has not been up to shower yet or ambulate much.  Abd is a bit distended this morning and she would like to stay until tomorrow to get a little more help with breast feeding.  Milk is not in yet and patient was not able to express or pump much.   Ok for d/c POD#3.    Zo Fox  Date of Service (when I saw the patient): 12/24/17

## 2017-12-24 NOTE — PLAN OF CARE
Problem: Postpartum ( Delivery) (Adult,Obstetrics,Pediatric)  Goal: Signs and Symptoms of Listed Potential Problems Will be Absent, Minimized or Managed (Postpartum)  Signs and symptoms of listed potential problems will be absent, minimized or managed by discharge/transition of care (reference Postpartum ( Delivery) (Adult,Obstetrics,Pediatric) CPG).   Outcome: Improving  Data: Vital signs within normal limits. Postpartum checks within normal limits - see flow record. Patient eating and drinking normally. Patient able to empty bladder independently and is up ambulating. No apparent signs of infection.medicated during the shift for pain . See MAR. Patient reassessed within 1 hour after each medication and pain was improved - patient stated she was comfortable. Patient education done about self and infant cares. See flow record.  Response: Positive attachment behaviors observed with infant. Support person present.   Plan: continue with plan of care.

## 2017-12-24 NOTE — PROGRESS NOTES
"Post Partum Progress Note     Subjective:  Patient doing well. Pain well controlled. Ambulating and voiding without difficulty. No nausea or vomiting. Lochia present and minimal.  Passing flatus. She denies headache, changes in vision, nausea/vomiting, chest pain, shortness of breath, RUQ pain, or worsening edema.  Plans to breastfeed.     Objective:  Blood pressure 100/58, pulse 83, temperature 97.5  F (36.4  C), temperature source Oral, resp. rate 16, height 1.55 m (5' 1.02\"), weight 59 kg (130 lb), last menstrual period 2017, SpO2 97 %, unknown if currently breastfeeding.     General: NAD. A&Ox3.  CV/resp: no increased work of breathing, well perfused  Abd: Soft, non-tender, non-distended. Fundus is firm and below the umbilicus.  Incision c/d/i.   Ext: trace edema. No calf tenderness.     Assessment/Plan:   Ramona Hill is a 37 year old  female who is POD#3 s/p RLTCS. Stable in the post-operative setting.     - Encourage routine post-operative goals including ambulation and incentive spirometry  - PNC: Rh positive. Rubella immune. No intervention indicated.  - Pain: controlled on oral medications  - Heme: Hgb 12.8> > 9.4. Will discharge home with iron  - GI: continue anti-emetics and stool softeners as needed.  - : Voiding spontaneously.  - Infant: Stable in room with mom  - Feeding: Plans on breastfeeding.  - BC: Declines     Discharge to home today     Elana Macdonald PGY3  2017 7:26 AM         "

## 2017-12-24 NOTE — PLAN OF CARE
Problem: Patient Care Overview  Goal: Plan of Care/Patient Progress Review  Outcome: Improving  Pain meds given for incisional pain, relief obtained. Incision C/D/I. Fundus firm and midline, U1. Breastfeeding and giving baby formula independently. Parents encouraged to exclusively breastfeed and given education that this is the healthiest option for baby. They expressed understanding but expressed desire to give baby formula in addition to breastfeeding due to baby continuing to show signs of hunger after breastfeeding. Attempted to hand express but was unable to obtain any colostrum. Vss

## 2017-12-25 VITALS
HEIGHT: 61 IN | HEART RATE: 93 BPM | DIASTOLIC BLOOD PRESSURE: 72 MMHG | WEIGHT: 130 LBS | TEMPERATURE: 98.6 F | SYSTOLIC BLOOD PRESSURE: 105 MMHG | BODY MASS INDEX: 24.55 KG/M2 | OXYGEN SATURATION: 97 % | RESPIRATION RATE: 16 BRPM

## 2017-12-25 PROCEDURE — 25000132 ZZH RX MED GY IP 250 OP 250 PS 637: Performed by: STUDENT IN AN ORGANIZED HEALTH CARE EDUCATION/TRAINING PROGRAM

## 2017-12-25 PROCEDURE — 25000132 ZZH RX MED GY IP 250 OP 250 PS 637: Performed by: OBSTETRICS & GYNECOLOGY

## 2017-12-25 RX ADMIN — OXYCODONE HYDROCHLORIDE 5 MG: 5 TABLET ORAL at 06:13

## 2017-12-25 RX ADMIN — SIMETHICONE 80 MG: 80 TABLET, CHEWABLE ORAL at 00:31

## 2017-12-25 RX ADMIN — SENNOSIDES AND DOCUSATE SODIUM 1 TABLET: 8.6; 5 TABLET ORAL at 08:40

## 2017-12-25 RX ADMIN — OXYCODONE HYDROCHLORIDE 5 MG: 5 TABLET ORAL at 09:53

## 2017-12-25 RX ADMIN — ACETAMINOPHEN 975 MG: 325 TABLET, FILM COATED ORAL at 06:12

## 2017-12-25 RX ADMIN — IBUPROFEN 800 MG: 800 TABLET, FILM COATED ORAL at 08:40

## 2017-12-25 NOTE — PLAN OF CARE
Problem: Patient Care Overview  Goal: Plan of Care/Patient Progress Review  Outcome: Adequate for Discharge Date Met: 12/25/17  Data: Vital signs within normal limits. Postpartum checks within normal limits - see flow record. Patient eating and drinking normally. Patient able to empty bladder independently and is up ambulating. Incision is clean and in tact. No apparent signs of infection noted and it is healing well. Pt complains burning discomfort on both ends of the incision. Pt is breastfeeding well, but needs to keep baby's head close to the breast from sliding to the nipple. Nipples are in tact and pt denies any soreness.  Patient performing self cares and is able to care for infant.  Action:  Checked latch, flange lip and move baby close to breast.  Patient medicated during the shift for pain control. See MAR. Patient reassessed for pain and patient stated she was comfortable. Reviewed teaching and discharge instructions with pt using the . Checked ID band. Pt was given discharge medications and copies of the discharge papers.   Response: Positive attachment behaviors observed with infant. Support persons  present.   Plan: pt discharge home today with baby in a car seat.

## 2017-12-25 NOTE — PLAN OF CARE
Referral made to [facility] for early discharge, note made patient uses Portuguese

## 2017-12-25 NOTE — PLAN OF CARE
Problem: Patient Care Overview  Goal: Plan of Care/Patient Progress Review  Outcome: Improving  VSS and postpartum assessments WDL.  Up ad johny with steady gait.  Independent with cares.  Bonding well with infant.  Breastfeeding on cue independently with good latch observed.  Pain managed with tylenol, ibuprofen and oxycodone per MAR.   present and very supportive with pt, infant, and toddler that is staying in room with them.  Planning to discharge home tomorrow.  Will continue with postpartum cares and education per plan of care.

## 2017-12-25 NOTE — OP NOTE
Ob/Gyn Operative Report       Ramona Hill  1980   [id number]        DATE OF PROCEDURE:  2017      PREOPERATIVE DIAGNOSES:   1.  Intrauterine pregnancy at 39 weeks and 2 days gestation.   2.  History of prior section.   3.  Desires repeat .      POSTOPERATIVE DIAGNOSES:   1.  Intrauterine pregnancy at 39 weeks and 2 days gestation.   2.  History of prior section.    3.  Desires repeat .      PROCEDURE:  Repeat low transverse  section and Pfannenstiel scar revision.      SURGEON:  Zo Fox MD      FIRST ASSISTANT:  Katie Marquez MD, PGY2      ANESTHESIA:  Spinal.      INDICATIONS:  Ramona Hill is a 36-year-old female,  2, para 1-0-0-1 who presented to Labor and Delivery at 39 weeks and 2 days gestation for a scheduled repeat  section.  Her previous baby was delivered by  section after arrest of labor.  This time, she requested to proceed with a repeat  section rather than a trial of labor.      OPERATIVE FINDINGS:  A keloid scar was present from her previous Pfannenstiel incision.  Dense scar tissue over the rectus fascia from the prior  section.  Intraabdominal adhesions were minimal.  The fetus was in the  left occiput anterior presentation.  Clear amniotic fluid was noted.  Apgars 9 at 1 minute, 9 at 5 minutes.  Weight was 6 pounds 2.8 ounces.  Uterus, fallopian tubes and ovaries appeared normal.      OPERATIVE  PROCEDURE IN DETAIL:  The patient was taken to the operating room where spinal anesthesia was administered.  She was placed in the left lateral tilt position and a Ibanez catheter was placed into the bladder.  She was then prepped and draped in the usual sterile fashion.  The spinal was checked to ensure adequate analgesia.  A timeout was then held.  An elliptical incision was then made around the previous Pfannenstiel scar and this was excised.  The subcutaneous tissue was then opened sharply down to the level of  the fascia.  The fascia was incised horizontally and the rectus abdominis muscles bluntly and sharply dissected away from the fascia superiorly and inferiorly to the pubic symphysis.  During that dissection, the peritoneum was entered and a vertical peritoneal incision was then made.  The bladder blade was placed and the bladder flap was created.  A transverse incision was then made in the lower uterine segment and extended bilaterally in a curvilinear fashion.  The infant was delivered from a cephalic presentation with a copious amount of clear fluid noted.  The delivery was without difficulty.  After approximately 1 minute of delayed cord clamping, the cord was clamped and the infant was passed off to the nursery nurse in attendance with findings as noted above.  The placenta was then manually extracted and the uterine cavity wiped free of remaining clot and membrane.  Pitocin was given through the running IV and excellent uterine tone was noted.  The uterus was then closed with 2 layers of 0 Monocryl suture, the second imbricating the first.  Complete hemostasis was noted.  The uterus was placed back into the abdominal cavity and the abdomen and pelvis irrigated with warm normal saline solution.  The incision was inspected and noted to be hemostatic.  The rectus muscles, peritoneal edges and fascia were all inspected and hemostasis achieved using electrocautery.  Seprafilm was then placed over the anterior surface of the uterus as well as a small piece over the rectus muscles.  The fascia was then closed with 0 Vicryl suture.  The subcutaneous tissue was copiously irrigated and a running layer of 2-0 plain gut was used to reapproximate the subcutaneous fat.  The skin edges were then reapproximated with 4-0 Monocryl suture.  The incision was covered with a pressure dressing and the patient was taken to the recovery room where she was given a TAP block.  The final EBL was 798 mL based on the surgical quantified  blood loss calculation.  The sponge and needle counts were correct at the conclusion of the case.  The patient tolerated the procedure well.  There were no complications.  Total iv fluids were 1600 mL of crystalloid with 250 mL of clear yellow urine noted at the end of the case.         VONNIE LAMB MD             D: 2017 20:50   T: 2017 20:51   MT: REINALDO      Name:     JOVAN ARITA   MRN:      [id number]        Account:        [id number]   :      1980           Procedure Date: 2017      Document: [id number]

## 2017-12-25 NOTE — PLAN OF CARE
Problem: Patient Care Overview  Goal: Plan of Care/Patient Progress Review  Outcome: Therapy, progress toward functional goals as expected  Data: Vital signs within normal limits. Postpartum checks within normal limits - see flow record. Patient eating and drinking normally. Patient able to empty bladder independently and is up ambulating. No apparent signs of infection. Incision healing well. Patient performing self cares and is able to care for infant. Breastfeeding independently, milk starting to come in.   Action: Patient taking tylenol, ibuprofen, and oxycodone for pain. Pain managed well. Began taking simethicone to help eliminate gas pain and bloating.   Response: Positive attachment behaviors observed with infant. Support persons present.   Plan: Anticipate discharge on 12/25.

## 2018-02-19 ENCOUNTER — PRENATAL OFFICE VISIT (OUTPATIENT)
Dept: OBGYN | Facility: CLINIC | Age: 38
End: 2018-02-19
Payer: COMMERCIAL

## 2018-02-19 VITALS
HEART RATE: 81 BPM | OXYGEN SATURATION: 100 % | BODY MASS INDEX: 23.89 KG/M2 | DIASTOLIC BLOOD PRESSURE: 68 MMHG | WEIGHT: 121.7 LBS | TEMPERATURE: 98.3 F | HEIGHT: 60 IN | SYSTOLIC BLOOD PRESSURE: 100 MMHG

## 2018-02-19 PROCEDURE — 99207 ZZC POST PARTUM EXAM: CPT | Performed by: OBSTETRICS & GYNECOLOGY

## 2018-02-19 RX ORDER — CHOLECALCIFEROL (VITAMIN D3) 50 MCG
2000 TABLET ORAL DAILY
Qty: 100 TABLET | Refills: 3 | Status: SHIPPED | OUTPATIENT
Start: 2018-02-19 | End: 2018-11-21

## 2018-02-19 RX ORDER — ACETAMINOPHEN AND CODEINE PHOSPHATE 120; 12 MG/5ML; MG/5ML
1 SOLUTION ORAL DAILY
Qty: 28 TABLET | Refills: 11 | Status: SHIPPED | OUTPATIENT
Start: 2018-02-19 | End: 2018-04-24

## 2018-02-19 NOTE — PROGRESS NOTES
"Chief Complaint   Patient presents with     Post Partum Exam       Initial /68 (BP Location: Left arm, Patient Position: Sitting, Cuff Size: Adult Regular)  Pulse 81  Temp 98.3  F (36.8  C) (Oral)  Ht 4' 11.75\" (1.518 m)  Wt 121 lb 11.2 oz (55.2 kg)  LMP 2018  SpO2 100%  Breastfeeding? Yes  BMI 23.97 kg/m2 Estimated body mass index is 23.97 kg/(m^2) as calculated from the following:    Height as of this encounter: 4' 11.75\" (1.518 m).    Weight as of this encounter: 121 lb 11.2 oz (55.2 kg).  BP completed using cuff size: regular        The following HM Due: NONE      The following patient reported/Care Every where data was sent to:  P ABSTRACT QUALITY INITIATIVES [00117]  n/a      n/a and patient has appointment for today            SUBJECTIVE:  Ramona Hill is a 37 year old female  here for a postpartum visit.  She had a repeat  Section  on 2217 delivering a healthy baby girl weighing 6 lbs 3 oz at term.      delivery complications:  No  breast feeding:  Yes, and supplementing  bladder problems:  No  bowel problems/hemorrhoids:  No  episiotomy/laceration/incision healed? Yes  vaginal flow:  Yes, just had first period  [place]:  No  contraception:  oral contraceptive  emotional adjustment:  doing well and happy  back to work:  No    PHQ-9 SCORE 2017   Total Score 1 0 0         OBJECTIVE:  Blood pressure 100/68, pulse 81, temperature 98.3  F (36.8  C), temperature source Oral, height 4' 11.75\" (1.518 m), weight 121 lb 11.2 oz (55.2 kg), last menstrual period 2018, SpO2 100 %, currently breastfeeding.   General - pleasant female in no acute distress.  Breast - no nodularity, asymmetry or nipple discharge bilaterally.  Abdomen - soft, nontender, nondistended   Incision well healed   Pelvic exam was limited as patient does not tolerate them at all.    Pelvic - EG: normal adult female, BUS: within normal limits, Vagina: well rugated, no " discharge, Cervix: no lesions or CMT, Uterus: firm, normal sized and nontender, Adnexae: no masses or tenderness.  Rectovaginal - deferred.    ASSESSMENT:  normal postpartum exam    PLAN:  May resume normal activities without restrictions  Pap smear was not  done today  The patient will use oral contraceptive for birth control.  She will call to switch to combination pills when done breast feeding. Plans to breast feed for about a yr.   Return to clinic in one year for an annual, when due for a pap smear or PRN.

## 2018-02-19 NOTE — MR AVS SNAPSHOT
"              After Visit Summary   2/19/2018    Ramona Hill    MRN: [id number]           Patient Information     Date Of Birth          1980        Visit Information        Provider Department      2/19/2018 4:30 PM Zo Fox MD; [place] [facility] [place]        Today's Diagnoses     Routine postpartum follow-up    -  1       Follow-ups after your visit        Who to contact     If you have questions or need follow up information about today's clinic visit or your schedule please contact [facility] [place] [place] directly at [phone].  Normal or non-critical lab and imaging results will be communicated to you by [employer], letter or phone within 4 business days after the clinic has received the results. If you do not hear from us within 7 days, please contact the clinic through [employer] or phone. If you have a critical or abnormal lab result, we will notify you by phone as soon as possible.  Submit refill requests through [employer] or call your pharmacy and they will forward the refill request to us. Please allow 3 business days for your refill to be completed.          Additional Information About Your Visit        MyChart Information     [employer] gives you secure access to your electronic health record. If you see a primary care provider, you can also send messages to your care team and make appointments. If you have questions, please call your primary care clinic.  If you do not have a primary care provider, please call [phone] and they will assist you.        Care EveryWhere ID     This is your Care EveryWhere ID. This could be used by other organizations to access your [place] medical records  [id number]        Your Vitals Were     Pulse Temperature Height Last Period Pulse Oximetry Breastfeeding?    81 98.3  F (36.8  C) (Oral) 4' 11.75\" (1.518 m) 02/17/2018 100% Yes    BMI (Body Mass Index)                   23.97 kg/m2            Blood Pressure " from Last 3 Encounters:   02/19/18 100/68   12/25/17 105/72   12/12/17 108/64    Weight from Last 3 Encounters:   02/19/18 121 lb 11.2 oz (55.2 kg)   12/22/17 130 lb (59 kg)   12/12/17 129 lb 14.4 oz (58.9 kg)              Today, you had the following     No orders found for display         Today's Medication Changes          These changes are accurate as of 2/19/18 11:59 PM.  If you have any questions, ask your nurse or doctor.               Start taking these medicines.        Dose/Directions    norethindrone 0.35 MG per tablet   Commonly known as:  MICRONOR   Used for:  Routine postpartum follow-up   Started by:  Zo Fox MD        Dose:  1 tablet   Take 1 tablet (0.35 mg) by mouth daily   Quantity:  28 tablet   Refills:  11       vitamin D 2000 UNITS tablet   Used for:  Routine postpartum follow-up   Started by:  Zo Fox MD        Dose:  2000 Units   Take 2,000 Units by mouth daily   Quantity:  100 tablet   Refills:  3         Stop taking these medicines if you haven't already. Please contact your care team if you have questions.     acetaminophen 325 MG tablet   Commonly known as:  TYLENOL   Stopped by:  Zo Fox MD           prenatal multivitamin plus iron 27-0.8 MG Tabs per tablet   Stopped by:  Zo Fox MD           senna-docusate 8.6-50 MG per tablet   Commonly known as:  SENOKOT-S;PERICOLACE   Stopped by:  Zo Fox MD                Where to get your medicines      These medications were sent to [facility] [address] [place] [address] AVE [address] AT [facility] & [address] [facility] [address]     Phone:  [phone]     norethindrone 0.35 MG per tablet    vitamin D 2000 UNITS tablet                Primary Care Provider Office Phone # Fax #    [facility]al [facility] [phone]       [address] [place] [id number]        Equal Access to Services      CASTRO PALMA : Hadii aad thelma bailey Browne, wamarineda luqadaha, qaybta kaalmabelkis mazariegoskarenbelkis, laura ricardomontanasocrates lockett. So [facility] [phone].    ATENCIÓN: Si habla español, tiene a soto disposición servicios gratuitos de asistencia lingüística. Llame al [phone].    We comply with applicable federal civil rights laws and [place] laws. We do not discriminate on the basis of race, color, national origin, age, disability, sex, sexual orientation, or gender identity.            Thank you!     Thank you for choosing [facility]  for your care. Our goal is always to provide you with excellent care. Hearing back from our patients is one way we can continue to improve our services. Please take a few minutes to complete the written survey that you may receive in the mail after your visit with us. Thank you!             Your Updated Medication List - Protect others around you: Learn how to safely use, store and throw away your medicines at www.disposemymeds.org.          This list is accurate as of 18 11:59 PM.  Always use your most recent med list.                   Brand Name Dispense Instructions for use Diagnosis    ferrous sulfate 325 (65 FE) MG tablet    IRON    30 tablet    Take 1 tablet (325 mg) by mouth daily (with breakfast)    S/P  section       norethindrone 0.35 MG per tablet    MICRONOR    28 tablet    Take 1 tablet (0.35 mg) by mouth daily    Routine postpartum follow-up       vitamin D 2000 UNITS tablet     100 tablet    Take 2,000 Units by mouth daily    Routine postpartum follow-up

## 2018-02-20 ASSESSMENT — PATIENT HEALTH QUESTIONNAIRE - PHQ9: SUM OF ALL RESPONSES TO PHQ QUESTIONS 1-9: 0

## 2018-02-21 PROBLEM — O09.629 HIGH-RISK PREGNANCY, YOUNG MULTIGRAVIDA, UNSPECIFIED TRIMESTER: Status: RESOLVED | Noted: 2017-06-13 | Resolved: 2018-02-21

## 2018-02-21 PROBLEM — O99.820 GBS (GROUP B STREPTOCOCCUS CARRIER), +RV CULTURE, CURRENTLY PREGNANT: Status: RESOLVED | Noted: 2017-12-19 | Resolved: 2018-02-21

## 2018-02-21 PROBLEM — O09.623: Status: RESOLVED | Noted: 2017-11-20 | Resolved: 2018-02-21

## 2018-02-21 PROBLEM — O34.219 PREVIOUS CESAREAN DELIVERY, ANTEPARTUM CONDITION OR COMPLICATION: Status: RESOLVED | Noted: 2017-06-13 | Resolved: 2018-02-21

## 2018-04-24 ENCOUNTER — OFFICE VISIT (OUTPATIENT)
Dept: INTERNAL MEDICINE | Facility: CLINIC | Age: 38
End: 2018-04-24
Payer: COMMERCIAL

## 2018-04-24 VITALS
OXYGEN SATURATION: 98 % | HEART RATE: 85 BPM | TEMPERATURE: 99.1 F | WEIGHT: 120.2 LBS | SYSTOLIC BLOOD PRESSURE: 100 MMHG | RESPIRATION RATE: 14 BRPM | BODY MASS INDEX: 23.67 KG/M2 | DIASTOLIC BLOOD PRESSURE: 56 MMHG

## 2018-04-24 DIAGNOSIS — R53.83 FATIGUE, UNSPECIFIED TYPE: Primary | ICD-10-CM

## 2018-04-24 DIAGNOSIS — E55.9 VITAMIN D DEFICIENCY: ICD-10-CM

## 2018-04-24 LAB
ANION GAP SERPL CALCULATED.3IONS-SCNC: 7 MMOL/L (ref 3–14)
BUN SERPL-MCNC: 13 MG/DL (ref 7–30)
CALCIUM SERPL-MCNC: 8.6 MG/DL (ref 8.5–10.1)
CHLORIDE SERPL-SCNC: 110 MMOL/L (ref 94–109)
CO2 SERPL-SCNC: 26 MMOL/L (ref 20–32)
CREAT SERPL-MCNC: 0.67 MG/DL (ref 0.52–1.04)
ERYTHROCYTE [DISTWIDTH] IN BLOOD BY AUTOMATED COUNT: 12.8 % (ref 10–15)
GFR SERPL CREATININE-BSD FRML MDRD: >90 ML/MIN/1.7M2
GLUCOSE SERPL-MCNC: 74 MG/DL (ref 70–99)
HCT VFR BLD AUTO: 34.4 % (ref 35–47)
HGB BLD-MCNC: 11.2 G/DL (ref 11.7–15.7)
MCH RBC QN AUTO: 30.7 PG (ref 26.5–33)
MCHC RBC AUTO-ENTMCNC: 32.6 G/DL (ref 31.5–36.5)
MCV RBC AUTO: 94 FL (ref 78–100)
PLATELET # BLD AUTO: 218 10E9/L (ref 150–450)
POTASSIUM SERPL-SCNC: 3.9 MMOL/L (ref 3.4–5.3)
RBC # BLD AUTO: 3.65 10E12/L (ref 3.8–5.2)
SODIUM SERPL-SCNC: 143 MMOL/L (ref 133–144)
TSH SERPL DL<=0.005 MIU/L-ACNC: 1.88 MU/L (ref 0.4–4)
WBC # BLD AUTO: 10 10E9/L (ref 4–11)

## 2018-04-24 PROCEDURE — 99214 OFFICE O/P EST MOD 30 MIN: CPT | Performed by: INTERNAL MEDICINE

## 2018-04-24 PROCEDURE — 36415 COLL VENOUS BLD VENIPUNCTURE: CPT | Performed by: INTERNAL MEDICINE

## 2018-04-24 PROCEDURE — 80048 BASIC METABOLIC PNL TOTAL CA: CPT | Performed by: INTERNAL MEDICINE

## 2018-04-24 PROCEDURE — 84443 ASSAY THYROID STIM HORMONE: CPT | Performed by: INTERNAL MEDICINE

## 2018-04-24 PROCEDURE — 85027 COMPLETE CBC AUTOMATED: CPT | Performed by: INTERNAL MEDICINE

## 2018-04-24 NOTE — PATIENT INSTRUCTIONS
"  *  I do not suspect any serious cause for your fatigue beyond the two young children.    *  I will check to make sure that there are no other medical problemds causing the fatigue, such as thyroid disease, anemia ( low blood count) etc.      Your labs indicate a low Vitamin D level.  While the complete clinical significance of Vitamin D levels still remains to be determined, there is enough data to recommend supplementation whenever lower values are seen as it has been shown to help bone health, immune system function, and treat seasonal affective disorder.     I would recommend taking Vitamin D3 2000 units per day , which you can get at any pharmacy and most grocery stores (the cheapest prices are at [employer]).  We can repeat the levels the next time we do labs, there is no need to do it any earlier.        --Good Grains:  Oats, brown rice, Quinoa (these do not raise the blood sugar as much)     --Bad grains:  Anything made from wheat or white rice     (because these raise the blood sugars significantly, and the possible gluten issue from wheat for some people).      --Proteins:  Aim for \"lean proteins\" including chicken, fish, seafood, pork, turkey, and eggs (in moderation); Eat red meat only occasionally          "

## 2018-04-24 NOTE — PROGRESS NOTES
SUBJECTIVE:   Ramona Hill is a 37 year old female who presents to clinic today for the following health issues:      Fatigue      Duration: over the past 2 weeks    Description (location/character/radiation): pt has been wanting to sleep non stop, constantly tired    Intensity:  moderate    Accompanying signs and symptoms: pt has 4 yr old and 4 month old    History (similar episodes/previous evaluation): has been rx'd vit d in the past     Precipitating or alleviating factors: pt has 4 yr old and 4 month old    Therapies tried and outcome: pt is not taking vit d. would like Rx for this today           Problem list and histories reviewed & adjusted, as indicated.  Additional history: as documented        Reviewed and updated as needed this visit by clinical staff  Allergies  Meds       Reviewed and updated as needed this visit by Provider           Past Medical History:  ---------------------------  Past Medical History:   Diagnosis Date     NO ACTIVE PROBLEMS        Past Surgical History:  ---------------------------  Past Surgical History:   Procedure Laterality Date      SECTION N/A 2016    Procedure:  SECTION;  Surgeon: Tammie Colon MD;  [facility]      SECTION N/A 2017    Procedure:  SECTION;  Repeat  Section ;  Surgeon: Zo Fox MD;  Location: [facility]       Current Medications:  ---------------------------  Current Outpatient Prescriptions   Medication Sig Dispense Refill     Cholecalciferol (VITAMIN D) 2000 UNITS tablet Take 2,000 Units by mouth daily 100 tablet 3       Allergies:  -------------  No Known Allergies    Social History:  -------------------  Social History     Social History     Marital status: Single     Spouse name: N/A     Number of children: N/A     Years of education: N/A     Occupational History     Not on file.     Social History Main Topics     Smoking status: Never Smoker     Smokeless tobacco: Never Used      Alcohol use No     Drug use: No     Sexual activity: Yes     Partners: Male      Comment: partners x1 year     Other Topics Concern     Not on file     Social History Narrative    Caffeine intake/servings daily - 0    Calcium intake/servings daily - 3    Exercise 0  times weekly - describe ;     Sunscreen used - No    Seatbelts used - Yes    Guns stored in the home - No    Self Breast Exam - No    Pap test up to date -  No    Eye exam up to date -  No    Dental exam up to date -  No    DEXA scan up to date -  No    Flex Sig/Colonoscopy up to date -  No    Mammography up to date -  No    Immunizations reviewed and up to date - Yes    Abuse: Current or Past (Physical, Sexual or Emotional) - No    Do you feel safe in your environment - Yes    Do you cope well with stress - Yes    Do you suffer from insomnia - No    Last updated by: Sonia Regalado  7/14/2015               Family Medical History:  ------------------------------  Family History   Problem Relation Age of Onset     Family History Negative No family hx of          ROS:  REVIEW OF SYSTEMS:    RESP: negative for cough, dyspnea, wheezing, hemoptysis  CV: negative for chest pain, palpitations, PND, ALLISON, orthopnea; reports no changes in their ability to perform physical activity (from cardiovascular standpoint)  GI: negative for dysphagia, N/V, pain, melena, diarrhea and constipation  NEURO: negative for numbness/tingling, paralysis, incoordination, or focal weakness     OBJECTIVE:                                                    /56  Pulse 85  Temp 99.1  F (37.3  C) (Oral)  Resp 14  Wt 120 lb 3.2 oz (54.5 kg)  SpO2 98%  BMI 23.67 kg/m2     GENERAL alert and no distress  EYES:  Normal sclera,conjunctiva, EOMI  HENT: oral and posterior pharynx without lesions or erythema, facies symmetric  NECK: Neck supple. No LAD, without thyroidmegaly or JVD., Carotids without bruits.  RESP: Clear to ausculation bilaterally without wheezes  or crackles. Normal BS in all fields.  CV: RRR normal S1S2 without murmurs, rubs or gallops. PMI normal  LYMPH: no cervical lymph adenopathy appreciated  MS: extremities- no gross deformities of the visible extremities noted, no edema  PSYCH: Alert and oriented times 3; speech- coherent  SKIN:  No obvious significant skin lesions on visible portions of face     Results for orders placed or performed in visit on 04/24/18   CBC with platelets   Result Value Ref Range    WBC 10.0 4.0 - 11.0 10e9/L    RBC Count 3.65 (L) 3.8 - 5.2 10e12/L    Hemoglobin 11.2 (L) 11.7 - 15.7 g/dL    Hematocrit 34.4 (L) 35.0 - 47.0 %    MCV 94 78 - 100 fl    MCH 30.7 26.5 - 33.0 pg    MCHC 32.6 31.5 - 36.5 g/dL    RDW 12.8 10.0 - 15.0 %    Platelet Count 218 150 - 450 10e9/L   TSH with free T4 reflex   Result Value Ref Range    TSH 1.88 0.40 - 4.00 mU/L   Basic metabolic panel   Result Value Ref Range    Sodium 143 133 - 144 mmol/L    Potassium 3.9 3.4 - 5.3 mmol/L    Chloride 110 (H) 94 - 109 mmol/L    Carbon Dioxide 26 20 - 32 mmol/L    Anion Gap 7 3 - 14 mmol/L    Glucose 74 70 - 99 mg/dL    Urea Nitrogen 13 7 - 30 mg/dL    Creatinine 0.67 0.52 - 1.04 mg/dL    GFR Estimate >90 >60 mL/min/1.7m2    GFR Estimate If Black >90 >60 mL/min/1.7m2    Calcium 8.6 8.5 - 10.1 mg/dL         ASSESSMENT/PLAN:                                                      (R53.83) Fatigue, unspecified type  (primary encounter diagnosis)  Comment: No obvious medical cause for the fatigue other than 2 children under 2 years of age, including a 2 months old.   Will check for routine medical cause with the labs as ordered, suspect they will return abnormal.  Will follow up any abnormal labs as indicated.  Discussed many of the other cases for fatigue including situational things such as stress and problems at home or work.  Also discussed the possibility of underlying mood disorders in general.  Dicussed other things as well including sleep apnea, chronic fatigue  syndrome, post viral syndromes, fibromyalgia, connective tissue disease etc.   Plan: CBC with platelets, TSH with free T4 reflex,         Basic metabolic panel            (E55.9) Vitamin D deficiency  Comment:   Plan: start Vit D3 replacement therapy    *  I do not suspect any serious cause for your fatigue beyond the two young children.    *  I will check to make sure that there are no other medical problemds causing the fatigue, such as thyroid disease, anemia ( low blood count) etc.      Your labs indicate a low Vitamin D level.  While the complete clinical significance of Vitamin D levels still remains to be determined, there is enough data to recommend supplementation whenever lower values are seen as it has been shown to help bone health, immune system function, and treat seasonal affective disorder.     I would recommend taking Vitamin D3 2000 units per day , which you can get at any pharmacy and most grocery stores (the cheapest prices are at [employer] and [employer]).  We can repeat the levels the next time we do labs, there is no need to do it any earlier.       See Patient Instructions    CLARKE WHEELER M.D., MD  [facility]

## 2018-09-22 ENCOUNTER — NURSE TRIAGE (OUTPATIENT)
Dept: NURSING | Facility: CLINIC | Age: 38
End: 2018-09-22

## 2018-09-22 NOTE — TELEPHONE ENCOUNTER
They want to abort any pregnancy because they can't handle having a third child. I referred them to talk with the MD. I was going to connect with scheduling when he said they don't have any appointments early next week. I then advised they call Monday morning to see if they can squeeze in an appointment but it has to be the clinic staffs that do that. They will call the clinic Monday morning.  Lety Angelo RN-[facility]

## 2018-09-24 ENCOUNTER — OFFICE VISIT (OUTPATIENT)
Dept: OBGYN | Facility: CLINIC | Age: 38
End: 2018-09-24
Payer: COMMERCIAL

## 2018-09-24 VITALS
HEART RATE: 69 BPM | DIASTOLIC BLOOD PRESSURE: 69 MMHG | TEMPERATURE: 98.2 F | BODY MASS INDEX: 22.65 KG/M2 | SYSTOLIC BLOOD PRESSURE: 113 MMHG | WEIGHT: 115 LBS

## 2018-09-24 DIAGNOSIS — Z34.90 UNPLANNED PREGNANCY: Primary | ICD-10-CM

## 2018-09-24 PROCEDURE — 99213 OFFICE O/P EST LOW 20 MIN: CPT | Performed by: OBSTETRICS & GYNECOLOGY

## 2018-09-24 NOTE — MR AVS SNAPSHOT
After Visit Summary   9/24/2018    Ramona Hill    MRN: [id number]           Patient Information     Date Of Birth          1980        Visit Information        Provider Department      9/24/2018 2:15 PM Miley Michele MD; [facility]        Today's Diagnoses     Unplanned pregnancy    -  1       Follow-ups after your visit        Who to contact     If you have questions or need follow up information about today's clinic visit or your schedule please contact [facility] directly at [phone].  Normal or non-critical lab and imaging results will be communicated to you by [employer], letter or phone within 4 business days after the clinic has received the results. If you do not hear from us within 7 days, please contact the clinic through [employer] or phone. If you have a critical or abnormal lab result, we will notify you by phone as soon as possible.  Submit refill requests through [employer] or call your pharmacy and they will forward the refill request to us. Please allow 3 business days for your refill to be completed.          Additional Information About Your Visit        MyChart Information     [employer] gives you secure access to your electronic health record. If you see a primary care provider, you can also send messages to your care team and make appointments. If you have questions, please call your primary care clinic.  If you do not have a primary care provider, please call [phone] and they will assist you.        Care EveryWhere ID     This is your Care EveryWhere ID. This could be used by other organizations to access your [facility] medical records  [id number]        Your Vitals Were     Pulse Temperature Last Period BMI (Body Mass Index)          69 98.2  F (36.8  C) (Oral) 08/20/2018 (Approximate) 22.65 kg/m2         Blood Pressure from Last 3 Encounters:   09/24/18 113/69   04/24/18 100/56   02/19/18 100/68    Weight from  Last 3 Encounters:   09/24/18 115 lb (52.2 kg)   04/24/18 120 lb 3.2 oz (54.5 kg)   02/19/18 121 lb 11.2 oz (55.2 kg)              Today, you had the following     No orders found for display       Primary Care Provider Office Phone # Fax #    [facility] [phone]       [address] [address]        Equal Access to Services     CASTRO PALMA : Hadii aad ku hadasho Soomaali, waaxda luqadaha, qaybta kaalmada adeegyada, waxay idiin hayaan adeeg kharash la'aan ah. So [facility] [phone].    ATENCIÓN: Si habla español, tiene a soto disposición servicios gratuitos de asistencia lingüística. [facility] [phone].    We comply with applicable federal civil rights laws and [place] laws. We do not discriminate on the basis of race, color, national origin, age, disability, sex, sexual orientation, or gender identity.            Thank you!     Thank you for choosing [facility]  for your care. Our goal is always to provide you with excellent care. Hearing back from our patients is one way we can continue to improve our services. Please take a few minutes to complete the written survey that you may receive in the mail after your visit with us. Thank you!             Your Updated Medication List - Protect others around you: Learn how to safely use, store and throw away your medicines at www.disposemymeds.org.          This list is accurate as of 9/24/18 11:59 PM.  Always use your most recent med list.                   Brand Name Dispense Instructions for use Diagnosis    vitamin D 2000 units tablet     100 tablet    Take 2,000 Units by mouth daily    Routine postpartum follow-up

## 2018-09-24 NOTE — PROGRESS NOTES
S:  Ramona presents for discussion of early pregnancy, here with her  and small children.  Delivered by c-setion in December. Was not planning pregnancy currently. Would possibly like more children in a few years, but currently overwhelmed energy-wise and financially. She and her  are working separate shifts and trading  duties when they are not at their jobs.   They have discussed it and they would like to pursue pregnancy termination.    Past Medical History:   Diagnosis Date     NO ACTIVE PROBLEMS      Past Surgical History:   Procedure Laterality Date      SECTION N/A 2016    Procedure:  SECTION;  Surgeon: Tammie Colon MD;  [facility]      SECTION N/A 2017    Procedure:  SECTION;  Repeat  Section ;  Surgeon: Zo Fox MD;  [facility]       Current Outpatient Prescriptions:      Cholecalciferol (VITAMIN D) 2000 UNITS tablet, Take 2,000 Units by mouth daily, Disp: 100 tablet, Rfl: 3  No current facility-administered medications for this visit.     Facility-Administered Medications Ordered in Other Visits:      lidocaine 2%, , , PRN, Ely Nunes MD, 5 mL at 16 0928     No Known Allergies        O:  Vitals:    18 1420   BP: 113/69   Pulse: 69   Temp: 98.2  F (36.8  C)   TempSrc: Oral   Weight: 115 lb (52.2 kg)     Patient's last menstrual period was 2018 (approximate).     Gen: well appearing  Psych: normal mentation, affect appropriate.    A/P:  38 year old  with pregnancy at 5 weeks gestation.  - Discussed options for pregnancy termination and provided referral information.   - Discussed contraception options given pregnancy is not currently desired. Interested in Nexplanon or IUD. Provided with written information  - Questions answered.     Greater than 50% of this 15 minute appointment was spent in counseling on unplanned pregnancy.

## 2018-09-24 NOTE — NURSING NOTE
"Chief Complaint   Patient presents with     Consult     c section 9 months ago, patient is pregnant unexpectedly took a home pregnancy test last week came back positive       Initial /69  Pulse 69  Temp 98.2  F (36.8  C) (Oral)  Wt 115 lb (52.2 kg)  LMP 2018 (Approximate)  BMI 22.65 kg/m2 Estimated body mass index is 22.65 kg/(m^2) as calculated from the following:    Height as of 18: 4' 11.75\" (1.518 m).    Weight as of this encounter: 115 lb (52.2 kg).  BP completed using cuff size: regular        The following HM Due: NONE      The following patient reported/Care Every where data was sent to:  P ABSTRACT QUALITY INITIATIVES [22042]        patient has appointment for today  Harika Euceda                "

## 2018-11-12 ENCOUNTER — TELEPHONE (OUTPATIENT)
Dept: OBGYN | Facility: CLINIC | Age: 38
End: 2018-11-12

## 2018-11-12 ENCOUNTER — PRENATAL OFFICE VISIT (OUTPATIENT)
Dept: NURSING | Facility: CLINIC | Age: 38
End: 2018-11-12
Payer: COMMERCIAL

## 2018-11-12 VITALS
WEIGHT: 114.7 LBS | HEART RATE: 86 BPM | TEMPERATURE: 98 F | BODY MASS INDEX: 22.52 KG/M2 | DIASTOLIC BLOOD PRESSURE: 61 MMHG | SYSTOLIC BLOOD PRESSURE: 108 MMHG | HEIGHT: 60 IN

## 2018-11-12 DIAGNOSIS — Z23 NEED FOR TDAP VACCINATION: ICD-10-CM

## 2018-11-12 DIAGNOSIS — B95.1 GROUP B STREPTOCOCCUS URINARY TRACT INFECTION AFFECTING PREGNANCY IN FIRST TRIMESTER: ICD-10-CM

## 2018-11-12 DIAGNOSIS — O21.0 HYPEREMESIS GRAVIDARUM: Primary | ICD-10-CM

## 2018-11-12 DIAGNOSIS — O09.529 AMA (ADVANCED MATERNAL AGE) MULTIGRAVIDA 35+: Primary | ICD-10-CM

## 2018-11-12 DIAGNOSIS — O23.41 GROUP B STREPTOCOCCUS URINARY TRACT INFECTION AFFECTING PREGNANCY IN FIRST TRIMESTER: ICD-10-CM

## 2018-11-12 LAB
ABO + RH BLD: NORMAL
ABO + RH BLD: NORMAL
ALBUMIN UR-MCNC: NEGATIVE MG/DL
APPEARANCE UR: CLEAR
BILIRUB UR QL STRIP: NEGATIVE
BLD GP AB SCN SERPL QL: NORMAL
BLOOD BANK CMNT PATIENT-IMP: NORMAL
COLOR UR AUTO: YELLOW
ERYTHROCYTE [DISTWIDTH] IN BLOOD BY AUTOMATED COUNT: 14.3 % (ref 10–15)
GLUCOSE UR STRIP-MCNC: NEGATIVE MG/DL
HCT VFR BLD AUTO: 34 % (ref 35–47)
HGB BLD-MCNC: 11.5 G/DL (ref 11.7–15.7)
HGB UR QL STRIP: NEGATIVE
KETONES UR STRIP-MCNC: NEGATIVE MG/DL
LEUKOCYTE ESTERASE UR QL STRIP: NEGATIVE
MCH RBC QN AUTO: 30.9 PG (ref 26.5–33)
MCHC RBC AUTO-ENTMCNC: 33.8 G/DL (ref 31.5–36.5)
MCV RBC AUTO: 91 FL (ref 78–100)
NITRATE UR QL: NEGATIVE
PH UR STRIP: 6.5 PH (ref 5–7)
PLATELET # BLD AUTO: 206 10E9/L (ref 150–450)
RBC # BLD AUTO: 3.72 10E12/L (ref 3.8–5.2)
SOURCE: NORMAL
SP GR UR STRIP: 1.02 (ref 1–1.03)
SPECIMEN EXP DATE BLD: NORMAL
UROBILINOGEN UR STRIP-ACNC: 0.2 EU/DL (ref 0.2–1)
WBC # BLD AUTO: 10.9 10E9/L (ref 4–11)

## 2018-11-12 PROCEDURE — 86762 RUBELLA ANTIBODY: CPT | Performed by: OBSTETRICS & GYNECOLOGY

## 2018-11-12 PROCEDURE — 86900 BLOOD TYPING SEROLOGIC ABO: CPT | Performed by: OBSTETRICS & GYNECOLOGY

## 2018-11-12 PROCEDURE — 86850 RBC ANTIBODY SCREEN: CPT | Performed by: OBSTETRICS & GYNECOLOGY

## 2018-11-12 PROCEDURE — 87086 URINE CULTURE/COLONY COUNT: CPT | Performed by: OBSTETRICS & GYNECOLOGY

## 2018-11-12 PROCEDURE — 86780 TREPONEMA PALLIDUM: CPT | Performed by: OBSTETRICS & GYNECOLOGY

## 2018-11-12 PROCEDURE — 85027 COMPLETE CBC AUTOMATED: CPT | Performed by: OBSTETRICS & GYNECOLOGY

## 2018-11-12 PROCEDURE — 81003 URINALYSIS AUTO W/O SCOPE: CPT | Performed by: OBSTETRICS & GYNECOLOGY

## 2018-11-12 PROCEDURE — 36415 COLL VENOUS BLD VENIPUNCTURE: CPT | Performed by: OBSTETRICS & GYNECOLOGY

## 2018-11-12 PROCEDURE — 87340 HEPATITIS B SURFACE AG IA: CPT | Performed by: OBSTETRICS & GYNECOLOGY

## 2018-11-12 PROCEDURE — 86901 BLOOD TYPING SEROLOGIC RH(D): CPT | Performed by: OBSTETRICS & GYNECOLOGY

## 2018-11-12 PROCEDURE — 87389 HIV-1 AG W/HIV-1&-2 AB AG IA: CPT | Performed by: OBSTETRICS & GYNECOLOGY

## 2018-11-12 PROCEDURE — 99207 ZZC NO CHARGE NURSE ONLY: CPT

## 2018-11-12 RX ORDER — DOXYLAMINE SUCCINATE AND PYRIDOXINE HYDROCHLORIDE, DELAYED RELEASE TABLETS 10 MG/10 MG 10; 10 MG/1; MG/1
TABLET, DELAYED RELEASE ORAL
Status: CANCELLED | OUTPATIENT
Start: 2018-11-12

## 2018-11-12 RX ORDER — PRENATAL VIT/IRON FUM/FOLIC AC 27MG-0.8MG
TABLET ORAL
Refills: 1 | COMMUNITY
Start: 2018-10-20 | End: 2018-11-21

## 2018-11-12 NOTE — PROGRESS NOTES
Important Information for Provider:     Patient presents for new ob teaching and labs, third pregnancy, C sections. Discussed first trimester screening. Handouts reviewed and given. TE to Dr Aguilar  Send Rx to updated pharmacy, for B6,Unisom. Has NOB with Dr Aguilar 11/21/18. Drinks 1 tea daily, encouraged walking , precautions given.     Prenatal OB Questionnaire  Patient supplied answers from flow sheet for:  Prenatal OB Questionnaire.  Past Medical History  Diabetes?: No  Hypertension : No  Heart disease, mitral valve prolapse or rheumatic fever?: No  An autoimmune disease such as lupus or rheumatoid arthritis?: No  Kidney disease or urinary tract infection?: No  Epilepsy, seizures or spells?: No  Migraine headaches?: No  A stroke or loss of function or sensation?: No  Any other neurological problems?: No  Have you ever been treated for depression?: No  Are you having problems with crying spells or loss of self-esteem?: No  Have you ever required psychiatric care?: No  Have you ever had hepatitis, liver disease or jaundice?: No  Have you been treated for blood clots in your veins, deep vein thromosis, inflammation in the veins, thrombosis, phlebitis, pulmonary embolism or varicosities?: No  Have you had excessive bleeding after surgery or dental work?: No  Do you bleed more than other women after a cut or scratch?: No  Do you have a history of anemia?: No  Have you ever had thyroid problems or taken thyroid medication?: No   Do you have any endocrine problems?: No  Have you ever been in a major accident or suffered serious trauma?: No  Within the last year, has anyone hit, slapped, kicked or otherwise hurt you?: No  In the last year, has anyone forced you to have sex when you didn't want to?: No    Past Medical History 2   Have you ever received a blood transfusion?: No  Would you refuse a blood transfusion if a doctor judged it to be medically necessary?: No   If you answered Yes, would you rather die  than receive a blood transfusion?: No  If you answered Yes, is this for Zoroastrian reasons?: No  Does anyone in your home smoke?: No  Do you use tobacco products?: No  Do you drink beer, wine or hard liquor?: No  Do you use any of the following: marijuana, speed, cocaine, heroin, hallucinogens or other drugs?: No   Is your blood type Rh negative?: No  Have you ever had abnormal antibodies in your blood?: No  Have you ever had asthma?: No  Have you ever had tuberculosis?: No  Do you have any allergies to drugs or over-the-counter medications?: No  Allergies: Dust Mites, Aspartame, Ethanol, Venlafaxine, Hydrochloride, Sertraline: No  Have you had any breast problems?: No  Have you ever ?: No  Have you had any gynecological surgical procedures such as cervical conization, a LEEP procedure, laser treatment, cryosurgery of the cervix or a dilation and curettage, etc?: No  Have you ever had any other surgical procedures?: 2 Csections  Have you been hospitalized for a nonsurgical reason excluding normal delivery?: No  Have you ever had any anesthetic complications?: No  Have you ever had an abnormal pap smear?: No    Past Medical History (Continued)  Do you have a history of abnormalities of the uterus?: No  Did your mother take AILEEN or any other hormones when she was pregnant with you?: No  Did it take you more than a year to become pregnant?: No  Have you ever been evaluated or treated for infertility?: No  Is there a history of medical problems in your family, which you feel may be important to this pregnancy?: No  Do you have any other problems we have not asked about which you feel may be important to this pregnancy?: No    Symptoms since last menstrual period  Do you have any of the following symptoms: abdominal pain, blood in stools or urine, chest pain, shortness of breath, coughing or vomiting up blood, your heart racing or skipping beats, nausea and vomiting, pain on urination or vaginal discharge or  bleed: (!) Yes (nausea)  Will the patient be 35 years old or older at the time of delivery?: (!) Yes    Has the patient, baby's father or anyone in either family had:  Thalassemia (Italian, Greek, Mediterranean or  background only) and an MCV result less than 80?: No  Neural tube defect such as meningomyelocele, spina bifida or anencephaly?: No  Congenital heart defect?: No  Down's Syndrome?: No  Alejandro-Sachs disease (Christianity, Cajun, Tajik-Dougherty)?: No  Sickle cell disease or trait ()?: No  Hemophilia or other inherited problems of blood?: No  Muscular dystrophy?: No  Cystic fibrosis?: No  [place]'s chorea?: No  Mental retardation/autism?: No  If yes, was the person tested for fragile X?: No  Any other inherited genetic or chromosomal disorder?: No  Maternal metabolic disorder (e.g Insulin-dependent diabetes, PKU)?: No  A child with birth defects not listed above?: No  Recurrent pregnancy loss or stillbirth?: No   Has the patient had any medications/street drugs/alcohol since her last menstrual period?: No  Does the patient or baby's father have any other genetic risks?: No    Infection History   Do you object to being tested for Hepatitis B?: No  Do you object to being tested for HIV?: No   Do you feel that you are at high risk for coming in contact with the AIDS virus?: No  Have you ever been treated for tuberculosis?: No  Have you ever had a positive skin test for tuberculosis?: No  Do you live with someone who has tuberculosis?: No  Have you ever been exposed to tuberculosis?: No  Do you have genital herpes?: No  Does your partner have genital herpes?: No  Have you had a viral illness since your last period?: No  Have you ever had gonorrhea, chlamydia, syphilis, venereal warts, trichomoniasis, pelvic inflammatory disease or any other sexually transmitted disease?: No  Do you know if you are a genital group B streptococcus carrier?: No  Have you had chicken pox/varicella?: No   Have you been  vaccinated against chicken Pox?: No  Have you had any other infectious diseases?: No      Allergies as of 11/12/2018:    Allergies as of 11/12/2018     (No Known Allergies)       Current medications are:  Current Outpatient Prescriptions   Medication Sig Dispense Refill     Cholecalciferol (VITAMIN D) 2000 UNITS tablet Take 2,000 Units by mouth daily 100 tablet 3     Prenatal-FeFum-FA-DHA w/o A (PRENATAL + DHA PO)            Early ultrasound screening tool:    Does patient have irregular periods?  No  Did patient use hormonal birth control in the three months prior to positive urine pregnancy test? No  Is the patient breastfeeding?  Yes  Is the patient 10 weeks or greater at time of education visit?  Yes

## 2018-11-12 NOTE — MR AVS SNAPSHOT
After Visit Summary   11/12/2018    Ramona Hill    MRN: [id number]           Patient Information     Date Of Birth          1980        Visit Information        Provider Department      11/12/2018 12:30 PM LANGUAGE МАРИЯ; JEN OB NURSE EDUCATION [facility]        Today's Diagnoses     AMA (advanced maternal age) multigravida 35+    -  1    Need for Tdap vaccination           Follow-ups after your visit        Your next 10 appointments already scheduled     Nov 21, 2018 11:30 AM CST   New Prenatal with Sofía Aguilar MD   [facility] ([facility])    [address] [place] [id number] [phone]              Who to contact     If you have questions or need follow up information about today's clinic visit or your schedule please contact [facility] directly at [phone].  Normal or non-critical lab and imaging results will be communicated to you by MyChart, letter or phone within 4 business days after the clinic has received the results. If you do not hear from us within 7 days, please contact the clinic through [employer] or phone. If you have a critical or abnormal lab result, we will notify you by phone as soon as possible.  Submit refill requests through [employer] or call your pharmacy and they will forward the refill request to us. Please allow 3 business days for your refill to be completed.          Additional Information About Your Visit        MyChart Information     [employer] gives you secure access to your electronic health record. If you see a primary care provider, you can also send messages to your care team and make appointments. If you have questions, please call your primary care clinic.  If you do not have a primary care provider, please call [phone] and they will assist you.        Care EveryWhere ID     This is your Care EveryWhere ID. This could be used by other organizations to  "access your [place] medical records  [id number]        Your Vitals Were     Pulse Temperature Height Last Period BMI (Body Mass Index)       86 98  F (36.7  C) 4' 11.75\" (1.518 m) 08/20/2018 (Approximate) 22.59 kg/m2        Blood Pressure from Last 3 Encounters:   11/12/18 108/61   09/24/18 113/69   04/24/18 100/56    Weight from Last 3 Encounters:   11/12/18 114 lb 11.2 oz (52 kg)   09/24/18 115 lb (52.2 kg)   04/24/18 120 lb 3.2 oz (54.5 kg)              We Performed the Following     *UA reflex to Microscopic and Culture ([place]; [facility]; [facility]; [facility]; [facility]; [facility]; [place]; [place])     ABO/Rh type and screen     CBC with platelets     Hepatitis B surface antigen     HIV Antigen Antibody Combo     Rubella Antibody IgG Quantitative     Treponema Abs w Reflex to RPR and Titer     UA without Microscopic        Primary Care Provider Office Phone # Fax #    [facility] [phone]       [address] [address]        Equal Access to Services     CASTRO PALMA AH: Hadii aad ku hadasho Soomaali, waaxda luqadaha, qaybta kaalmada adeegyada, laura franksin hayrosalien yair lockett. So [facility] [phone].    ATENCIÓN: Si habla español, tiene a soto disposición servicios gratuitos de asistencia lingüística. Llame al [phone].    We comply with applicable federal civil rights laws and [place] laws. We do not discriminate on the basis of race, color, national origin, age, disability, sex, sexual orientation, or gender identity.            Thank you!     Thank you for choosing [facility]  for your care. Our goal is always to provide you with excellent care. Hearing back from our patients is one way we can continue to improve our services. Please take a few minutes to complete the written survey that you may receive in the mail after your visit with us. Thank you!             Your Updated Medication List - " Protect others around you: Learn how to safely use, store and throw away your medicines at www.disposemymeds.org.          This list is accurate as of 11/12/18  1:22 PM.  Always use your most recent med list.                   Brand Name Dispense Instructions for use Diagnosis    PRENATAL + DHA PO           vitamin D 2000 units tablet     100 tablet    Take 2,000 Units by mouth daily    Routine postpartum follow-up

## 2018-11-13 LAB
HBV SURFACE AG SERPL QL IA: NONREACTIVE
HIV 1+2 AB+HIV1 P24 AG SERPL QL IA: NONREACTIVE
RUBV IGG SERPL IA-ACNC: 49 IU/ML
T PALLIDUM AB SER QL: NONREACTIVE

## 2018-11-13 RX ORDER — PYRIDOXINE HCL (VITAMIN B6) 25 MG
25 TABLET ORAL 2 TIMES DAILY PRN
Qty: 50 TABLET | Refills: 2 | Status: SHIPPED | OUTPATIENT
Start: 2018-11-13 | End: 2019-03-15

## 2018-11-14 LAB
BACTERIA SPEC CULT: ABNORMAL
BACTERIA SPEC CULT: ABNORMAL
SPECIMEN SOURCE: ABNORMAL

## 2018-11-19 ENCOUNTER — TELEPHONE (OUTPATIENT)
Dept: OBGYN | Facility: CLINIC | Age: 38
End: 2018-11-19

## 2018-11-19 DIAGNOSIS — O09.529 AMA (ADVANCED MATERNAL AGE) MULTIGRAVIDA 35+: Primary | ICD-10-CM

## 2018-11-19 RX ORDER — PRENATAL VIT/IRON FUM/FOLIC AC 27MG-0.8MG
1 TABLET ORAL DAILY
Qty: 100 TABLET | Refills: 3 | Status: SHIPPED | OUTPATIENT
Start: 2018-11-19 | End: 2019-07-10

## 2018-11-19 RX ORDER — AMPICILLIN TRIHYDRATE 500 MG
500 CAPSULE ORAL 3 TIMES DAILY
Qty: 21 CAPSULE | Refills: 0 | Status: SHIPPED | OUTPATIENT
Start: 2018-11-19 | End: 2019-02-21

## 2018-11-21 ENCOUNTER — PRENATAL OFFICE VISIT (OUTPATIENT)
Dept: OBGYN | Facility: CLINIC | Age: 38
End: 2018-11-21
Payer: COMMERCIAL

## 2018-11-21 VITALS
HEART RATE: 99 BPM | HEIGHT: 60 IN | OXYGEN SATURATION: 99 % | DIASTOLIC BLOOD PRESSURE: 69 MMHG | WEIGHT: 115.8 LBS | BODY MASS INDEX: 22.74 KG/M2 | SYSTOLIC BLOOD PRESSURE: 99 MMHG

## 2018-11-21 DIAGNOSIS — O23.41 GROUP B STREPTOCOCCUS URINARY TRACT INFECTION AFFECTING PREGNANCY IN FIRST TRIMESTER: ICD-10-CM

## 2018-11-21 DIAGNOSIS — R00.0 TACHYCARDIA: ICD-10-CM

## 2018-11-21 DIAGNOSIS — B95.1 GROUP B STREPTOCOCCUS URINARY TRACT INFECTION AFFECTING PREGNANCY IN FIRST TRIMESTER: ICD-10-CM

## 2018-11-21 DIAGNOSIS — O34.219 PREVIOUS CESAREAN SECTION COMPLICATING PREGNANCY: ICD-10-CM

## 2018-11-21 DIAGNOSIS — O09.529 SUPERVISION OF HIGH-RISK PREGNANCY OF ELDERLY MULTIGRAVIDA: Primary | ICD-10-CM

## 2018-11-21 LAB — TSH SERPL DL<=0.005 MIU/L-ACNC: 3.79 MU/L (ref 0.4–4)

## 2018-11-21 PROCEDURE — 84443 ASSAY THYROID STIM HORMONE: CPT | Performed by: OBSTETRICS & GYNECOLOGY

## 2018-11-21 PROCEDURE — 36415 COLL VENOUS BLD VENIPUNCTURE: CPT | Performed by: OBSTETRICS & GYNECOLOGY

## 2018-11-21 PROCEDURE — 99207 ZZC FIRST OB VISIT: CPT | Performed by: OBSTETRICS & GYNECOLOGY

## 2018-11-21 RX ORDER — CHOLECALCIFEROL (VITAMIN D3) 50 MCG
2000 TABLET ORAL DAILY
Qty: 100 TABLET | Refills: 3 | Status: SHIPPED | OUTPATIENT
Start: 2018-11-21 | End: 2019-03-15

## 2018-11-21 ASSESSMENT — PATIENT HEALTH QUESTIONNAIRE - PHQ9
SUM OF ALL RESPONSES TO PHQ QUESTIONS 1-9: 0
5. POOR APPETITE OR OVEREATING: NOT AT ALL

## 2018-11-21 ASSESSMENT — ANXIETY QUESTIONNAIRES
3. WORRYING TOO MUCH ABOUT DIFFERENT THINGS: NOT AT ALL
IF YOU CHECKED OFF ANY PROBLEMS ON THIS QUESTIONNAIRE, HOW DIFFICULT HAVE THESE PROBLEMS MADE IT FOR YOU TO DO YOUR WORK, TAKE CARE OF THINGS AT HOME, OR GET ALONG WITH OTHER PEOPLE: NOT DIFFICULT AT ALL
6. BECOMING EASILY ANNOYED OR IRRITABLE: NOT AT ALL
5. BEING SO RESTLESS THAT IT IS HARD TO SIT STILL: NOT AT ALL
GAD7 TOTAL SCORE: 0
7. FEELING AFRAID AS IF SOMETHING AWFUL MIGHT HAPPEN: NOT AT ALL
1. FEELING NERVOUS, ANXIOUS, OR ON EDGE: NOT AT ALL
2. NOT BEING ABLE TO STOP OR CONTROL WORRYING: NOT AT ALL

## 2018-11-21 NOTE — PROGRESS NOTES
"Chief Complaint   Patient presents with     Prenatal Care     13+2.       Initial BP 99/69 (BP Location: Left arm, Patient Position: Sitting, Cuff Size: Adult Regular)  Pulse 99  Ht 4' 11.75\" (1.518 m)  Wt 115 lb 12.8 oz (52.5 kg)  LMP 2018 (Approximate)  SpO2 99%  BMI 22.81 kg/m2 Estimated body mass index is 22.81 kg/(m^2) as calculated from the following:    Height as of this encounter: 4' 11.75\" (1.518 m).    Weight as of this encounter: 115 lb 12.8 oz (52.5 kg).  BP completed using cuff size: regular    Questioned patient about current smoking habits.  Pt. has never smoked.          The following HM Due: NONE      The following patient reported/Care Every where data was sent to:  P ABSTRACT QUALITY INITIATIVES [67743]  n/a      n/a and patient has appointment for today              "

## 2018-11-21 NOTE — PROGRESS NOTES
SUBJECTIVE:   Ramona Hill is a  38 year old female   @ 13w2d  by LMP who presents for a new Ob visit here today with her  and 2 little girls.  This pregnancy was unplanned, but they are accepting.     Baby is 11 months old. Patient is feeling nauseated and tired.  Was breastfeeding when she conceived.   Patient denies any bleeding or cramping.       Obstetric History       T2      L2     SAB0   TAB0   Ectopic0   Multiple0   Live Births2       # Outcome Date GA Lbr Christos/2nd Weight Sex Delivery Anes PTL Lv   3 Current            2 Term 17 39w2d  6 lb 2.8 oz (2.8 kg) F CS-LTranv Spinal  ROMELIA      Name: CELIA HILL      Apgar1:  9                Apgar5: 9   1 Term 16 40w2d  7 lb 1 oz (3.204 kg) F CS-LTranv Gen  ROMELIA      Apgar1:  8                Apgar5: 9           Patient Active Problem List   Diagnosis     Language barrier     Postpartum anxiety     Status post      Need for Tdap vaccination     AMA (advanced maternal age) multigravida 35+     Previous  section complicating pregnancy     Group B Streptococcus urinary tract infection affecting pregnancy in first trimester       Past Medical History:   Diagnosis Date     NO ACTIVE PROBLEMS        Past Surgical History:   Procedure Laterality Date      SECTION N/A 2016    Procedure:  SECTION;  Surgeon: Tammie Colon MD;  [facility]: [facility] L+[facility]      SECTION N/A 2017    Procedure:  SECTION;  Repeat  Section ;  Surgeon: Zo Fox MD;  [facility] L+[facility]        Current Outpatient Prescriptions   Medication     ampicillin (PRINCIPEN) 500 MG capsule     Prenatal Vit-Fe Fumarate-FA (PRENATAL MULTIVITAMIN PLUS IRON) 27-0.8 MG TABS per tablet     vitamin D3 (CHOLECALCIFEROL) 2000 units tablet     doxylamine (UNISOM) 25 MG TABS tablet     pyridOXINE (VITAMIN B-6) 25 MG tablet     No current facility-administered medications for this visit.   "    Facility-Administered Medications Ordered in Other Visits   Medication     lidocaine 2%       No Known Allergies      EXAM:  BP 99/69 (BP Location: Left arm, Patient Position: Sitting, Cuff Size: Adult Regular)  Pulse 99  Ht 4' 11.75\" (1.518 m)  Wt 115 lb 12.8 oz (52.5 kg)  LMP 2018 (Approximate)  SpO2 99%  BMI 22.81 kg/m2  GENERAL: WDWN F in NAD  HEENT: no abnormalities  NECK: thyroid palpable, slightly enlarged,  nontender or adenopathy  BACK: without CVAT or paraspinal tenderness  CHEST: clear to auscultation  CV: RRR with II/V  murmur  BREASTS: no palpable masses or adenopathy, no asymmetry or skin dimpling  ABDOMEN: S, NT, no palpable masses or hepatosplenomegaly  MUSCULOSKELETAL: no obvious abnormalities.  NEUROLOGICAL: normal strength, sensation, mental status  PSYCH: normal affect, appropriate  PELVIC: EG - normal adult female.  BUS - within normal limits.  Vagina - well rugated, no discharge.  Cervix - no lesions, no CMT.  Uterus - 13 wk  size and nontender.  Adnexae - no masses or tenderness.  RV - deferred.    FHT's present with doptone      ASSESSMENT/ PLAN:  IUP @ 13w2d by LMP c/w bedside US today    Encounter Diagnoses   Name Primary?     Supervision of high-risk pregnancy of elderly multigravida Yes     Tachycardia      Previous  section complicating pregnancy      Group B Streptococcus urinary tract infection affecting pregnancy in first trimester Currently on ampicillin       Patient noted to by slightly tachycardic today ~ .  Thyroid also palpable, seems enlarged. Will check TSH today.   Discussed need to stay hydrated. Patient feels like she is dehydrated today.   Review of chart indicates that there has not been a previously diagnosed heart murmur.    Check thyroid labs today.    She will begin vitamin D as has been low in the past.     Discussed routine prenatal care and first trimester screen testing.    She declined  the first trimester screen.      Patient was " counselled on healthy lifestyle habits and all questions answered.    New Ob labs reviewed today.    Return to Clinic in 4 weeks or sooner if problems arise.    Sofía Aguilar MD

## 2018-11-22 ASSESSMENT — ANXIETY QUESTIONNAIRES: GAD7 TOTAL SCORE: 0

## 2018-12-20 ENCOUNTER — PRENATAL OFFICE VISIT (OUTPATIENT)
Dept: OBGYN | Facility: CLINIC | Age: 38
End: 2018-12-20
Payer: COMMERCIAL

## 2018-12-20 VITALS
HEART RATE: 88 BPM | DIASTOLIC BLOOD PRESSURE: 63 MMHG | OXYGEN SATURATION: 99 % | SYSTOLIC BLOOD PRESSURE: 106 MMHG | HEIGHT: 60 IN | WEIGHT: 123.8 LBS | BODY MASS INDEX: 24.3 KG/M2

## 2018-12-20 DIAGNOSIS — O09.522 ELDERLY MULTIGRAVIDA IN SECOND TRIMESTER: Primary | ICD-10-CM

## 2018-12-20 DIAGNOSIS — E04.9 ENLARGED THYROID: ICD-10-CM

## 2018-12-20 PROCEDURE — 99207 ZZC PRENATAL VISIT: CPT | Performed by: OBSTETRICS & GYNECOLOGY

## 2018-12-20 ASSESSMENT — MIFFLIN-ST. JEOR: SCORE: 1169.08

## 2018-12-20 NOTE — PROGRESS NOTES
17w3d  No complaints.  Feeling better. Energy is better.   Declined quad screen.  Will get MFM US survey set up for AMA.   Thyroid feels slightly enlarged today but < last visit.   Lab Results   Component Value Date    TSH 3.79 11/21/2018   This is up compared to TSH in April 2018.  Will continue to monitor symptoms.   RR

## 2019-01-16 ENCOUNTER — PRE VISIT (OUTPATIENT)
Dept: MATERNAL FETAL MEDICINE | Facility: CLINIC | Age: 39
End: 2019-01-16

## 2019-01-17 ENCOUNTER — OFFICE VISIT (OUTPATIENT)
Dept: MATERNAL FETAL MEDICINE | Facility: CLINIC | Age: 39
End: 2019-01-17
Attending: OBSTETRICS & GYNECOLOGY
Payer: COMMERCIAL

## 2019-01-17 ENCOUNTER — HOSPITAL ENCOUNTER (OUTPATIENT)
Dept: ULTRASOUND IMAGING | Facility: CLINIC | Age: 39
Discharge: HOME | End: 2019-01-17
Attending: OBSTETRICS & GYNECOLOGY | Admitting: OBSTETRICS & GYNECOLOGY
Payer: COMMERCIAL

## 2019-01-17 DIAGNOSIS — O09.522 ELDERLY MULTIGRAVIDA IN SECOND TRIMESTER: Primary | ICD-10-CM

## 2019-01-17 DIAGNOSIS — O26.90 PREGNANCY RELATED CONDITION, ANTEPARTUM: ICD-10-CM

## 2019-01-17 DIAGNOSIS — Z36.89 SCREENING, ANTENATAL, FOR FETAL ANATOMIC SURVEY: ICD-10-CM

## 2019-01-17 PROCEDURE — 76811 OB US DETAILED SNGL FETUS: CPT

## 2019-01-17 PROCEDURE — 96040 ZZH GENETIC COUNSELING, EACH 30 MINUTES: CPT | Mod: ZF | Performed by: GENETIC COUNSELOR, MS

## 2019-01-17 NOTE — PROGRESS NOTES
[facility]  Genetic Counseling Consult    Patient:  Ramona Hill YOB: 1980   Date of Service:  19      Ramona Hill was seen at the [facility] [place] for genetic consultation as part of her appointment for comprehensive ultrasound.  The indication for genetic counseling is advanced maternal age.       Impression/Plan:   1. Ramona had a comprehensive (level II) ultrasound today.  Please see the ultrasound report for further details.    2. The patient declines genetic amniocentesis and aneuploidy screening today.    Pregnancy History:   /Parity:    Age at Delivery: 38 year old  TRIP: 2019, by Last Menstrual Period  Gestational Age: 21w3d    No significant complications or exposures were reported in the current pregnancy.    Pregnancy history:  o 2016: term, , female  o 2017: term, , female       Family History:   A three-generation pedigree was obtained, and is scanned under the  Media  tab.   The reported family history is negative for multiple miscarriages, stillbirths, birth defects, mental retardation, known genetic conditions, and consanguinity.       Carrier Screening:   The patient reports that she and the father of the pregnancy have  ancestry:      The hemoglobinopathies are a group of genetic blood diseases that occur with increased frequency in individuals of  ancestry and carrier screening for these conditions is available.  Carrier screening for the hemoglobinopathies includes a CBC with red blood cell indices, a ferritin level, and a quantitative hemoglobin electrophoresis or HPLC.  In addition,  screening in the [facility] includes many of the hemoglobinopathies.    Expanded carrier screening for mutations in a large panel of genes associated with autosomal recessive conditions including cystic fibrosis, spinal muscular atrophy, and others,  is now available.    Carrier screening was not discussed today.       Risk Assessment for Chromosome Conditions:   We explained that the risk for fetal chromosome abnormalities increases with maternal age. We discussed specific features of common chromosome abnormalities, including Down syndrome, trisomy 13, trisomy 18, and sex chromosome trisomies.      At age 38 at midtrimester, the risk to have a baby with Down syndrome is 1 in 216.     At age 38 at midtrimester, the risk to have a baby with any chromosome abnormality is 1 in 105.     Ramona did not have maternal serum screening earlier in pregnancy.       Testing Options:   We discussed the following options:   Non-invasive Prenatal Testing (NIPT)    Maternal plasma cell-free DNA testing; first trimester ultrasound with nuchal translucency and nasal bone assessment is recommended, when appropriate    Screens for fetal trisomy 21, trisomy 13, trisomy 18, and sex chromosome aneuploidy     Genetic Amniocentesis    Invasive procedure typically performed in the second trimester by which amniotic fluid is obtained for the purpose of chromosome analysis and/or other prenatal genetic analysis    Diagnostic results; >99% sensitivity for fetal chromosome abnormalities    AFAFP measurement tests for open neural tube defects     Comprehensive (Level II) ultrasound: Detailed ultrasound performed between 18-22 weeks gestation to screen for major birth defects and markers for aneuploidy.    We reviewed the benefits and limitations of this testing.  Screening tests provide a risk assessment specific to the pregnancy for certain fetal chromosome abnormalities, but cannot definitively diagnose or exclude a fetal chromosome abnormality.  Follow-up genetic counseling and consideration of diagnostic testing is recommended with any abnormal screening result. Diagnostic tests carry inherent risks- including risk of miscarriage- that require careful consideration.  These tests can detect  fetal chromosome abnormalities with greater than 99% certainty.  Results can be compromised by maternal cell contamination or mosaicism, and are limited by the resolution of cytogenetic G-banding technology.  There is no screening nor diagnostic test that can detect all forms of birth defects or mental disability.    It was a pleasure to be involved with Ramona s care. Face-to-face time of the meeting was 25 minutes.    Hilda Macias MS, [facility]  Maternal Fetal Medicine  [facility]  Phone:[phone]  Email: [email or web address]

## 2019-01-17 NOTE — PROGRESS NOTES
Please refer to ultrasound report under 'Imaging' Studies of 'Chart Review' tabs.    Willard Whiteside M.D.

## 2019-02-21 ENCOUNTER — PRENATAL OFFICE VISIT (OUTPATIENT)
Dept: OBGYN | Facility: CLINIC | Age: 39
End: 2019-02-21
Payer: COMMERCIAL

## 2019-02-21 VITALS
BODY MASS INDEX: 26.66 KG/M2 | HEART RATE: 91 BPM | DIASTOLIC BLOOD PRESSURE: 61 MMHG | SYSTOLIC BLOOD PRESSURE: 89 MMHG | WEIGHT: 135.8 LBS | HEIGHT: 60 IN | OXYGEN SATURATION: 98 %

## 2019-02-21 DIAGNOSIS — O09.522 ELDERLY MULTIGRAVIDA IN SECOND TRIMESTER: Primary | ICD-10-CM

## 2019-02-21 PROCEDURE — 99207 ZZC PRENATAL VISIT: CPT | Performed by: OBSTETRICS & GYNECOLOGY

## 2019-02-21 ASSESSMENT — MIFFLIN-ST. JEOR: SCORE: 1223.51

## 2019-02-21 NOTE — Clinical Note
Please add Ramona to my schedule for call day 3/15/19 at 2 PM.  She knows to call prior.  Thanks. RR

## 2019-02-22 NOTE — PROGRESS NOTES
26w3d  Here with  and 2 daughters. Feeling well.  More energy.  Can't do GCT today.   Had a normal MFM survey.   discussed PPTL at length today.  10-15 min.  At first she said yes, but clearly they had not thought this through.   At the end, they are considering nexplanon or IUD with option for interval tubal ligation. They will consider further and let me know. RR

## 2019-03-15 ENCOUNTER — PRENATAL OFFICE VISIT (OUTPATIENT)
Dept: OBGYN | Facility: CLINIC | Age: 39
End: 2019-03-15
Payer: COMMERCIAL

## 2019-03-15 VITALS
HEART RATE: 108 BPM | SYSTOLIC BLOOD PRESSURE: 111 MMHG | TEMPERATURE: 98.5 F | BODY MASS INDEX: 26.78 KG/M2 | WEIGHT: 136 LBS | DIASTOLIC BLOOD PRESSURE: 63 MMHG

## 2019-03-15 DIAGNOSIS — Z23 NEED FOR PROPHYLACTIC VACCINATION AND INOCULATION AGAINST INFLUENZA: Primary | ICD-10-CM

## 2019-03-15 DIAGNOSIS — O34.219 PREVIOUS CESAREAN SECTION COMPLICATING PREGNANCY: ICD-10-CM

## 2019-03-15 DIAGNOSIS — O09.522 ELDERLY MULTIGRAVIDA IN SECOND TRIMESTER: ICD-10-CM

## 2019-03-15 LAB
GLUCOSE 1H P 50 G GLC PO SERPL-MCNC: 108 MG/DL (ref 60–129)
HGB BLD-MCNC: 11 G/DL (ref 11.7–15.7)
TSH SERPL DL<=0.005 MIU/L-ACNC: 1.8 MU/L (ref 0.4–4)

## 2019-03-15 PROCEDURE — 82306 VITAMIN D 25 HYDROXY: CPT | Performed by: OBSTETRICS & GYNECOLOGY

## 2019-03-15 PROCEDURE — 90471 IMMUNIZATION ADMIN: CPT | Performed by: OBSTETRICS & GYNECOLOGY

## 2019-03-15 PROCEDURE — 99207 ZZC PRENATAL VISIT: CPT | Performed by: OBSTETRICS & GYNECOLOGY

## 2019-03-15 PROCEDURE — 84443 ASSAY THYROID STIM HORMONE: CPT | Performed by: OBSTETRICS & GYNECOLOGY

## 2019-03-15 PROCEDURE — 00000218 ZZHCL STATISTIC OBHBG - HEMOGLOBIN: Performed by: OBSTETRICS & GYNECOLOGY

## 2019-03-15 PROCEDURE — 82950 GLUCOSE TEST: CPT | Performed by: OBSTETRICS & GYNECOLOGY

## 2019-03-15 PROCEDURE — 86780 TREPONEMA PALLIDUM: CPT | Performed by: OBSTETRICS & GYNECOLOGY

## 2019-03-15 PROCEDURE — 36415 COLL VENOUS BLD VENIPUNCTURE: CPT | Performed by: OBSTETRICS & GYNECOLOGY

## 2019-03-15 PROCEDURE — 90686 IIV4 VACC NO PRSV 0.5 ML IM: CPT | Performed by: OBSTETRICS & GYNECOLOGY

## 2019-03-15 NOTE — PROGRESS NOTES
29w4d  No complaints.  Feeling better. Baby is moving well.   She and  discussed PPTL and they have decided to go with either nexplanon or IUD.   discussed timing of scheduled repeat  section.  GCT and labs today. RR

## 2019-03-16 LAB — T PALLIDUM AB SER QL: NONREACTIVE

## 2019-03-17 LAB — DEPRECATED CALCIDIOL+CALCIFEROL SERPL-MC: 27 UG/L (ref 20–75)

## 2019-03-18 ENCOUNTER — TELEPHONE (OUTPATIENT)
Dept: OBGYN | Facility: CLINIC | Age: 39
End: 2019-03-18

## 2019-03-18 NOTE — TELEPHONE ENCOUNTER
Called pt to go over details for c/s, left message to call back    Type of surgery: ob  Location of surgery: [facility] OR  Date and time of surgery: 5/20/19 1030a2  Surgeon: Lauren  Pre-Op Appt Date: tbd  Post-Op Appt Date: 6 weeks   Packet sent out: Yes  Pre-cert/Authorization completed:  No  Date: 3/18/2019    Idalia GUERRERO, Surgery Coordinator

## 2019-03-28 ENCOUNTER — PRENATAL OFFICE VISIT (OUTPATIENT)
Dept: OBGYN | Facility: CLINIC | Age: 39
End: 2019-03-28
Payer: COMMERCIAL

## 2019-03-28 VITALS
DIASTOLIC BLOOD PRESSURE: 72 MMHG | WEIGHT: 139 LBS | SYSTOLIC BLOOD PRESSURE: 118 MMHG | HEART RATE: 111 BPM | BODY MASS INDEX: 27.37 KG/M2 | TEMPERATURE: 98.2 F

## 2019-03-28 DIAGNOSIS — O09.529 HIGH-RISK PREGNANCY, ELDERLY MULTIGRAVIDA, UNSPECIFIED TRIMESTER: Primary | ICD-10-CM

## 2019-03-28 DIAGNOSIS — Z23 NEED FOR VACCINATION: ICD-10-CM

## 2019-03-28 PROCEDURE — 90715 TDAP VACCINE 7 YRS/> IM: CPT | Performed by: OBSTETRICS & GYNECOLOGY

## 2019-03-28 PROCEDURE — 99207 ZZC PRENATAL VISIT: CPT | Performed by: OBSTETRICS & GYNECOLOGY

## 2019-03-28 PROCEDURE — 90471 IMMUNIZATION ADMIN: CPT | Performed by: OBSTETRICS & GYNECOLOGY

## 2019-03-28 RX ORDER — FERROUS GLUCONATE 324(38)MG
324 TABLET ORAL
Qty: 60 TABLET | Refills: 3 | Status: SHIPPED | OUTPATIENT
Start: 2019-03-28 | End: 2021-06-11

## 2019-03-28 NOTE — PROGRESS NOTES
31w3d  No complaints. Baby is moving well. No ctx's.   Passed GCT.  Will begin iron supplements.   Pulse tends to run a little high at visits.  ~ 110 today.  discussed adequate hydration and patient trying to work on that. TSH was normal 2 wks ago. RR

## 2019-04-12 ENCOUNTER — PRENATAL OFFICE VISIT (OUTPATIENT)
Dept: OBGYN | Facility: CLINIC | Age: 39
End: 2019-04-12
Payer: COMMERCIAL

## 2019-04-12 VITALS
BODY MASS INDEX: 27.77 KG/M2 | WEIGHT: 141 LBS | OXYGEN SATURATION: 100 % | DIASTOLIC BLOOD PRESSURE: 58 MMHG | HEART RATE: 101 BPM | SYSTOLIC BLOOD PRESSURE: 101 MMHG

## 2019-04-12 DIAGNOSIS — O34.219 PREVIOUS CESAREAN SECTION COMPLICATING PREGNANCY: ICD-10-CM

## 2019-04-12 DIAGNOSIS — O09.523 ELDERLY MULTIGRAVIDA IN THIRD TRIMESTER: Primary | ICD-10-CM

## 2019-04-12 PROCEDURE — 99207 ZZC PRENATAL VISIT: CPT | Performed by: OBSTETRICS & GYNECOLOGY

## 2019-04-12 NOTE — PROGRESS NOTES
33w4d   No complaints.  Just feeling more pressure.    Baby is moving well.  Weight gain is perfect.   Has repeat  section scheduled. Pulse is 101 today.  RR

## 2019-04-25 ENCOUNTER — PRENATAL OFFICE VISIT (OUTPATIENT)
Dept: OBGYN | Facility: CLINIC | Age: 39
End: 2019-04-25
Payer: COMMERCIAL

## 2019-04-25 VITALS
WEIGHT: 143 LBS | SYSTOLIC BLOOD PRESSURE: 110 MMHG | DIASTOLIC BLOOD PRESSURE: 66 MMHG | TEMPERATURE: 97.7 F | HEART RATE: 99 BPM | BODY MASS INDEX: 28.16 KG/M2

## 2019-04-25 DIAGNOSIS — Z34.80 SUPERVISION OF OTHER NORMAL PREGNANCY, ANTEPARTUM: Primary | ICD-10-CM

## 2019-04-25 PROCEDURE — 99207 ZZC PRENATAL VISIT: CPT | Performed by: OBSTETRICS & GYNECOLOGY

## 2019-05-09 ENCOUNTER — PRENATAL OFFICE VISIT (OUTPATIENT)
Dept: OBGYN | Facility: CLINIC | Age: 39
End: 2019-05-09
Payer: COMMERCIAL

## 2019-05-09 VITALS
OXYGEN SATURATION: 97 % | HEIGHT: 60 IN | DIASTOLIC BLOOD PRESSURE: 67 MMHG | SYSTOLIC BLOOD PRESSURE: 102 MMHG | BODY MASS INDEX: 28.98 KG/M2 | WEIGHT: 147.6 LBS | HEART RATE: 76 BPM | TEMPERATURE: 96.9 F

## 2019-05-09 DIAGNOSIS — O34.219 PREVIOUS CESAREAN SECTION COMPLICATING PREGNANCY: ICD-10-CM

## 2019-05-09 DIAGNOSIS — Z34.80 SUPERVISION OF OTHER NORMAL PREGNANCY, ANTEPARTUM: Primary | ICD-10-CM

## 2019-05-09 LAB — HGB BLD-MCNC: 11.6 G/DL (ref 11.7–15.7)

## 2019-05-09 PROCEDURE — 00000218 ZZHCL STATISTIC OBHBG - HEMOGLOBIN: Performed by: OBSTETRICS & GYNECOLOGY

## 2019-05-09 PROCEDURE — 99207 ZZC PRENATAL VISIT: CPT | Performed by: OBSTETRICS & GYNECOLOGY

## 2019-05-09 PROCEDURE — 36416 COLLJ CAPILLARY BLOOD SPEC: CPT | Performed by: OBSTETRICS & GYNECOLOGY

## 2019-05-09 ASSESSMENT — MIFFLIN-ST. JEOR: SCORE: 1277.04

## 2019-05-09 NOTE — PROGRESS NOTES
37w3d  No complaints.   Baby is moving a lot. Has pain in rib cage and midsternum from growing pregnancy,  Denies heart burn. Has not been able to take her iron since it makes her sick. Will check hgb today.   Has been drinking more water and heart rate is better.   P= 76 today,   Has repeat  section scheduled for 2 wks.  discussed lab draw prior to surgery date.   Normal weight gain. GBS + from UTI at NOB.   RR

## 2019-05-14 DIAGNOSIS — Z01.818 PRE-OP EXAM: Primary | ICD-10-CM

## 2019-05-14 DIAGNOSIS — O34.219 PREVIOUS CESAREAN SECTION COMPLICATING PREGNANCY: ICD-10-CM

## 2019-05-17 DIAGNOSIS — Z01.818 PRE-OP EXAM: ICD-10-CM

## 2019-05-17 DIAGNOSIS — O34.219 PREVIOUS CESAREAN SECTION COMPLICATING PREGNANCY: ICD-10-CM

## 2019-05-17 LAB
ABO + RH BLD: NORMAL
ABO + RH BLD: NORMAL
BLD GP AB SCN SERPL QL: NORMAL
BLOOD BANK CMNT PATIENT-IMP: NORMAL
ERYTHROCYTE [DISTWIDTH] IN BLOOD BY AUTOMATED COUNT: 14.4 % (ref 10–15)
HCT VFR BLD AUTO: 36 % (ref 35–47)
HGB BLD-MCNC: 12 G/DL (ref 11.7–15.7)
MCH RBC QN AUTO: 33.2 PG (ref 26.5–33)
MCHC RBC AUTO-ENTMCNC: 33.3 G/DL (ref 31.5–36.5)
MCV RBC AUTO: 100 FL (ref 78–100)
PLATELET # BLD AUTO: 137 10E9/L (ref 150–450)
RBC # BLD AUTO: 3.61 10E12/L (ref 3.8–5.2)
SPECIMEN EXP DATE BLD: NORMAL
WBC # BLD AUTO: 11.3 10E9/L (ref 4–11)

## 2019-05-17 PROCEDURE — 86900 BLOOD TYPING SEROLOGIC ABO: CPT | Performed by: OBSTETRICS & GYNECOLOGY

## 2019-05-17 PROCEDURE — 86901 BLOOD TYPING SEROLOGIC RH(D): CPT | Performed by: OBSTETRICS & GYNECOLOGY

## 2019-05-17 PROCEDURE — 86780 TREPONEMA PALLIDUM: CPT | Performed by: OBSTETRICS & GYNECOLOGY

## 2019-05-17 PROCEDURE — 85027 COMPLETE CBC AUTOMATED: CPT | Performed by: OBSTETRICS & GYNECOLOGY

## 2019-05-17 PROCEDURE — 86850 RBC ANTIBODY SCREEN: CPT | Performed by: OBSTETRICS & GYNECOLOGY

## 2019-05-17 PROCEDURE — 36415 COLL VENOUS BLD VENIPUNCTURE: CPT | Performed by: OBSTETRICS & GYNECOLOGY

## 2019-05-18 LAB — T PALLIDUM AB SER QL: NONREACTIVE

## 2019-05-19 NOTE — H&P
[facility]  OB History and Physical      Ramona Hill MRN# [id number]   Age: 38 year old YOB: 1980     CC:  Repeat     HPI:  Ms. Ramona Hill is a 38 year old  at 39w0d by LMP, who presents for scheduled repeat  section.  She denies contractions, vaginal bleeding, and loss of fluid.   + normal fetal movement.    Pregnancy Complications:  Hx CS x2   AMA  GBS+       Prenatal Labs:   Lab Results   Component Value Date    ABO O 2019    RH Pos 2019    AS Neg 2019    HEPBANG Nonreactive 2018    CHPCRT  2015     Negative   Negative for C. trachomatis rRNA by transcription mediated amplification.   A negative result by transcription mediated amplification does not preclude the   presence of C. trachomatis infection because results are dependent on proper   and adequate collection, absence of inhibitors, and sufficient rRNA to be   detected.      GCPCRT  2015     Negative   Negative for N. gonorrhoeae rRNA by transcription mediated amplification.   A negative result by transcription mediated amplification does not preclude the   presence of N. gonorrhoeae infection because results are dependent on proper   and adequate collection, absence of inhibitors, and sufficient rRNA to be   detected.      TREPAB Negative 2017    HGB 12.0 2019       GBS Status:   Lab Results   Component Value Date    GBS Positive (A) 2017       Ultrasounds  Anatomy:  Cardiac activity present.  bpm.  Fetal movements present.  Presentation breech.  Placenta posterior, There is no evidence of placenta previa.  Umbilical cord 3 vessel cord.  Amniotic fluid MVP 6.2 cm    Normal anatomy     OB History  OB History    Para Term  AB Living   3 2 2 0 0 2   SAB TAB Ectopic Multiple Live Births   0 0 0 0 2      # Outcome Date GA Lbr Christos/2nd Weight Sex Delivery Anes PTL Lv   3 Current            2 Term 17 39w2d   2.8 kg (6 lb 2.8 oz) F CS-LTranv Spinal  ROMELIA      Name: CELIA HILL      Apgar1: 9  Apgar5: 9   1 Term 16 40w2d  3.204 kg (7 lb 1 oz) F CS-LTranv Gen  ROMELIA      Apgar1: 8  Apgar5: 9       PMHx:   Past Medical History:   Diagnosis Date     NO ACTIVE PROBLEMS      PSHx:   Past Surgical History:   Procedure Laterality Date      SECTION N/A 2016    Procedure:  SECTION;  Surgeon: Tammie Colon MD;  [facility]      SECTION N/A 2017    Procedure:  SECTION;  Repeat  Section ;  Surgeon: Zo Fox MD;  [facility] L[facility]     Meds:   Medications Prior to Admission   Medication Sig Dispense Refill Last Dose     Cholecalciferol (VITAMIN D PO)    2019 at Unknown time     Prenatal Vit-Fe Fumarate-FA (PRENATAL MULTIVITAMIN PLUS IRON) 27-0.8 MG TABS per tablet Take 1 tablet by mouth daily 100 tablet 3 2019 at Unknown time     ferrous gluconate (FERGON) 324 (38 Fe) MG tablet Take 1 tablet (324 mg) by mouth daily (with breakfast) (Patient not taking: Reported on 2019) 60 tablet 3 More than a month at Unknown time     Allergies:  No Known Allergies   FmHx:   Family History   Problem Relation Age of Onset     Family History Negative No family hx of      SocHx: She denies any tobacco, alcohol, or other drug use during this pregnancy.    ROS:   Complete 10-point ROS negative except as noted in HPI.    PE:  Vit:   Patient Vitals for the past 4 hrs:   BP Temp Temp src Resp   19 0850 106/62 98.1  F (36.7  C) Oral 16      Gen: Well-appearing, NAD, comfortable   CV: rrr, no mrg   Pulm: Ctab, no wheezes or crackles   Abd: Soft, gravid, non-tender  Ext: no LE edema b/l  Cx: deferred    FHT: Baseline 125, mod variability, + accelerations, no decelerations, cat I , reactive NST   [place]: 0 contractions in 10 minutes      Assessment  Ms. Ramona Hill is a 38 year old , at 39w0d by LMP, who presents for scheduled repeat . Risks  including infection, bleeding, injury to surrounding structures discussed. Amenable to transfusion if needed. Consent signed. Hebrew in-person  use.     Plan  Admit to L&D  Labor: Scheduled repeat .   FWB: Category I FHT.  Continue EFM and toco  Pain: Spinal analgesia  PNC: Rh pos, Rubella immune, GBS positive bacteruria, , Placenta posterior  Fen/GI: NPO, IVF     The patient was discussed with Dr. Aguilar who is in agreement with the treatment plan.    Makla Mustafa MD PGY2  Department of OB/GYN      Physician Attestation   I, Sofía Aguilar, personally saw and evaluated Ramona Hill.  Her baby's status is reassuring with a reactive NST.  I have reviewed the above note and agree with details.  I have reviewed the consent with patient and verified her informed consent. She is declining PPTL today.   Sofía Aguilar MD  May 20, 2019

## 2019-05-20 ENCOUNTER — HOSPITAL ENCOUNTER (INPATIENT)
Facility: CLINIC | Age: 39
LOS: 2 days | Discharge: HOME HEALTH | End: 2019-05-22
Attending: OBSTETRICS & GYNECOLOGY | Admitting: OBSTETRICS & GYNECOLOGY
Payer: COMMERCIAL

## 2019-05-20 ENCOUNTER — SURGERY (OUTPATIENT)
Age: 39
End: 2019-05-20
Payer: COMMERCIAL

## 2019-05-20 ENCOUNTER — ANESTHESIA EVENT (OUTPATIENT)
Dept: OBGYN | Facility: CLINIC | Age: 39
End: 2019-05-20
Payer: COMMERCIAL

## 2019-05-20 ENCOUNTER — ANESTHESIA (OUTPATIENT)
Dept: OBGYN | Facility: CLINIC | Age: 39
End: 2019-05-20
Payer: COMMERCIAL

## 2019-05-20 DIAGNOSIS — Z98.891 S/P CESAREAN SECTION: ICD-10-CM

## 2019-05-20 DIAGNOSIS — Z98.891 STATUS POST C-SECTION: Primary | ICD-10-CM

## 2019-05-20 DIAGNOSIS — O09.529 HIGH-RISK PREGNANCY, ELDERLY MULTIGRAVIDA, UNSPECIFIED TRIMESTER: ICD-10-CM

## 2019-05-20 PROCEDURE — 25800030 ZZH RX IP 258 OP 636: Performed by: STUDENT IN AN ORGANIZED HEALTH CARE EDUCATION/TRAINING PROGRAM

## 2019-05-20 PROCEDURE — 25000125 ZZHC RX 250: Performed by: STUDENT IN AN ORGANIZED HEALTH CARE EDUCATION/TRAINING PROGRAM

## 2019-05-20 PROCEDURE — 71000012 ZZH RECOVERY PHASE 1 LEVEL 1 FIRST HR: Performed by: OBSTETRICS & GYNECOLOGY

## 2019-05-20 PROCEDURE — 27110028 ZZH OR GENERAL SUPPLY NON-STERILE: Performed by: OBSTETRICS & GYNECOLOGY

## 2019-05-20 PROCEDURE — 36000059 ZZH SURGERY LEVEL 3 EA 15 ADDTL MIN UMMC: Performed by: OBSTETRICS & GYNECOLOGY

## 2019-05-20 PROCEDURE — 37000009 ZZH ANESTHESIA TECHNICAL FEE, EACH ADDTL 15 MIN: Performed by: OBSTETRICS & GYNECOLOGY

## 2019-05-20 PROCEDURE — 25000128 H RX IP 250 OP 636: Performed by: STUDENT IN AN ORGANIZED HEALTH CARE EDUCATION/TRAINING PROGRAM

## 2019-05-20 PROCEDURE — 25000132 ZZH RX MED GY IP 250 OP 250 PS 637: Performed by: STUDENT IN AN ORGANIZED HEALTH CARE EDUCATION/TRAINING PROGRAM

## 2019-05-20 PROCEDURE — 36000057 ZZH SURGERY LEVEL 3 1ST 30 MIN - UMMC: Performed by: OBSTETRICS & GYNECOLOGY

## 2019-05-20 PROCEDURE — C9290 INJ, BUPIVACAINE LIPOSOME: HCPCS | Performed by: STUDENT IN AN ORGANIZED HEALTH CARE EDUCATION/TRAINING PROGRAM

## 2019-05-20 PROCEDURE — 40000170 ZZH STATISTIC PRE-PROCEDURE ASSESSMENT II: Performed by: OBSTETRICS & GYNECOLOGY

## 2019-05-20 PROCEDURE — 25800030 ZZH RX IP 258 OP 636

## 2019-05-20 PROCEDURE — 59514 CESAREAN DELIVERY ONLY: CPT | Mod: 80 | Performed by: OBSTETRICS & GYNECOLOGY

## 2019-05-20 PROCEDURE — 37000008 ZZH ANESTHESIA TECHNICAL FEE, 1ST 30 MIN: Performed by: OBSTETRICS & GYNECOLOGY

## 2019-05-20 PROCEDURE — 27210794 ZZH OR GENERAL SUPPLY STERILE: Performed by: OBSTETRICS & GYNECOLOGY

## 2019-05-20 PROCEDURE — C1765 ADHESION BARRIER: HCPCS | Performed by: OBSTETRICS & GYNECOLOGY

## 2019-05-20 PROCEDURE — 25000132 ZZH RX MED GY IP 250 OP 250 PS 637

## 2019-05-20 PROCEDURE — 12000001 ZZH R&B MED SURG/OB UMMC

## 2019-05-20 PROCEDURE — 59510 CESAREAN DELIVERY: CPT | Mod: GC | Performed by: OBSTETRICS & GYNECOLOGY

## 2019-05-20 RX ORDER — ACETAMINOPHEN 325 MG/1
650 TABLET ORAL EVERY 4 HOURS PRN
Status: DISCONTINUED | OUTPATIENT
Start: 2019-05-23 | End: 2019-05-22 | Stop reason: HOSPADM

## 2019-05-20 RX ORDER — HYDROCORTISONE 2.5 %
CREAM (GRAM) TOPICAL 3 TIMES DAILY PRN
Status: DISCONTINUED | OUTPATIENT
Start: 2019-05-20 | End: 2019-05-22 | Stop reason: HOSPADM

## 2019-05-20 RX ORDER — CITRIC ACID/SODIUM CITRATE 334-500MG
SOLUTION, ORAL ORAL
Status: COMPLETED
Start: 2019-05-20 | End: 2019-05-20

## 2019-05-20 RX ORDER — IBUPROFEN 800 MG/1
800 TABLET, FILM COATED ORAL EVERY 6 HOURS PRN
Status: DISCONTINUED | OUTPATIENT
Start: 2019-05-20 | End: 2019-05-22 | Stop reason: HOSPADM

## 2019-05-20 RX ORDER — SODIUM CHLORIDE, SODIUM LACTATE, POTASSIUM CHLORIDE, CALCIUM CHLORIDE 600; 310; 30; 20 MG/100ML; MG/100ML; MG/100ML; MG/100ML
INJECTION, SOLUTION INTRAVENOUS CONTINUOUS
Status: DISCONTINUED | OUTPATIENT
Start: 2019-05-20 | End: 2019-05-22 | Stop reason: HOSPADM

## 2019-05-20 RX ORDER — METHYLERGONOVINE MALEATE 0.2 MG/ML
200 INJECTION INTRAVENOUS
Status: DISCONTINUED | OUTPATIENT
Start: 2019-05-20 | End: 2019-05-22 | Stop reason: HOSPADM

## 2019-05-20 RX ORDER — MEPERIDINE HYDROCHLORIDE 25 MG/ML
12.5 INJECTION INTRAMUSCULAR; INTRAVENOUS; SUBCUTANEOUS
Status: DISCONTINUED | OUTPATIENT
Start: 2019-05-20 | End: 2019-05-22 | Stop reason: HOSPADM

## 2019-05-20 RX ORDER — ONDANSETRON 2 MG/ML
4 INJECTION INTRAMUSCULAR; INTRAVENOUS EVERY 6 HOURS PRN
Status: DISCONTINUED | OUTPATIENT
Start: 2019-05-20 | End: 2019-05-22 | Stop reason: HOSPADM

## 2019-05-20 RX ORDER — CEFAZOLIN SODIUM 2 G/100ML
2 INJECTION, SOLUTION INTRAVENOUS
Status: COMPLETED | OUTPATIENT
Start: 2019-05-20 | End: 2019-05-20

## 2019-05-20 RX ORDER — MORPHINE SULFATE 1 MG/ML
INJECTION, SOLUTION EPIDURAL; INTRATHECAL; INTRAVENOUS PRN
Status: DISCONTINUED | OUTPATIENT
Start: 2019-05-20 | End: 2019-05-20

## 2019-05-20 RX ORDER — ONDANSETRON 2 MG/ML
4 INJECTION INTRAMUSCULAR; INTRAVENOUS EVERY 30 MIN PRN
Status: DISCONTINUED | OUTPATIENT
Start: 2019-05-20 | End: 2019-05-20

## 2019-05-20 RX ORDER — SIMETHICONE 80 MG
80 TABLET,CHEWABLE ORAL 4 TIMES DAILY PRN
Status: DISCONTINUED | OUTPATIENT
Start: 2019-05-20 | End: 2019-05-22 | Stop reason: HOSPADM

## 2019-05-20 RX ORDER — SODIUM CHLORIDE, SODIUM LACTATE, POTASSIUM CHLORIDE, CALCIUM CHLORIDE 600; 310; 30; 20 MG/100ML; MG/100ML; MG/100ML; MG/100ML
INJECTION, SOLUTION INTRAVENOUS CONTINUOUS PRN
Status: DISCONTINUED | OUTPATIENT
Start: 2019-05-20 | End: 2019-05-20

## 2019-05-20 RX ORDER — NALOXONE HYDROCHLORIDE 0.4 MG/ML
.1-.4 INJECTION, SOLUTION INTRAMUSCULAR; INTRAVENOUS; SUBCUTANEOUS
Status: DISCONTINUED | OUTPATIENT
Start: 2019-05-20 | End: 2019-05-20

## 2019-05-20 RX ORDER — DEXTROSE, SODIUM CHLORIDE, SODIUM LACTATE, POTASSIUM CHLORIDE, AND CALCIUM CHLORIDE 5; .6; .31; .03; .02 G/100ML; G/100ML; G/100ML; G/100ML; G/100ML
INJECTION, SOLUTION INTRAVENOUS CONTINUOUS
Status: DISCONTINUED | OUTPATIENT
Start: 2019-05-20 | End: 2019-05-22 | Stop reason: HOSPADM

## 2019-05-20 RX ORDER — FENTANYL CITRATE 50 UG/ML
25-50 INJECTION, SOLUTION INTRAMUSCULAR; INTRAVENOUS
Status: DISCONTINUED | OUTPATIENT
Start: 2019-05-20 | End: 2019-05-20 | Stop reason: HOSPADM

## 2019-05-20 RX ORDER — BUPIVACAINE HYDROCHLORIDE 7.5 MG/ML
INJECTION, SOLUTION INTRASPINAL PRN
Status: DISCONTINUED | OUTPATIENT
Start: 2019-05-20 | End: 2019-05-20

## 2019-05-20 RX ORDER — OXYTOCIN/0.9 % SODIUM CHLORIDE 30/500 ML
PLASTIC BAG, INJECTION (ML) INTRAVENOUS CONTINUOUS PRN
Status: DISCONTINUED | OUTPATIENT
Start: 2019-05-20 | End: 2019-05-20

## 2019-05-20 RX ORDER — ONDANSETRON 2 MG/ML
INJECTION INTRAMUSCULAR; INTRAVENOUS PRN
Status: DISCONTINUED | OUTPATIENT
Start: 2019-05-20 | End: 2019-05-20

## 2019-05-20 RX ORDER — AMOXICILLIN 250 MG
1 CAPSULE ORAL 2 TIMES DAILY PRN
Status: DISCONTINUED | OUTPATIENT
Start: 2019-05-20 | End: 2019-05-22 | Stop reason: HOSPADM

## 2019-05-20 RX ORDER — MISOPROSTOL 200 UG/1
800 TABLET ORAL
Status: DISCONTINUED | OUTPATIENT
Start: 2019-05-20 | End: 2019-05-22 | Stop reason: HOSPADM

## 2019-05-20 RX ORDER — NALOXONE HYDROCHLORIDE 0.4 MG/ML
.1-.4 INJECTION, SOLUTION INTRAMUSCULAR; INTRAVENOUS; SUBCUTANEOUS
Status: DISCONTINUED | OUTPATIENT
Start: 2019-05-20 | End: 2019-05-22 | Stop reason: HOSPADM

## 2019-05-20 RX ORDER — FENTANYL CITRATE 50 UG/ML
INJECTION, SOLUTION INTRAMUSCULAR; INTRAVENOUS PRN
Status: DISCONTINUED | OUTPATIENT
Start: 2019-05-20 | End: 2019-05-20

## 2019-05-20 RX ORDER — CITRIC ACID/SODIUM CITRATE 334-500MG
30 SOLUTION, ORAL ORAL
Status: COMPLETED | OUTPATIENT
Start: 2019-05-20 | End: 2019-05-20

## 2019-05-20 RX ORDER — LIDOCAINE 40 MG/G
CREAM TOPICAL
Status: DISCONTINUED | OUTPATIENT
Start: 2019-05-20 | End: 2019-05-20

## 2019-05-20 RX ORDER — SODIUM CHLORIDE, SODIUM LACTATE, POTASSIUM CHLORIDE, CALCIUM CHLORIDE 600; 310; 30; 20 MG/100ML; MG/100ML; MG/100ML; MG/100ML
INJECTION, SOLUTION INTRAVENOUS
Status: COMPLETED
Start: 2019-05-20 | End: 2019-05-20

## 2019-05-20 RX ORDER — SODIUM CHLORIDE, SODIUM LACTATE, POTASSIUM CHLORIDE, CALCIUM CHLORIDE 600; 310; 30; 20 MG/100ML; MG/100ML; MG/100ML; MG/100ML
INJECTION, SOLUTION INTRAVENOUS CONTINUOUS
Status: DISCONTINUED | OUTPATIENT
Start: 2019-05-20 | End: 2019-05-20 | Stop reason: HOSPADM

## 2019-05-20 RX ORDER — BISACODYL 10 MG
10 SUPPOSITORY, RECTAL RECTAL DAILY PRN
Status: DISCONTINUED | OUTPATIENT
Start: 2019-05-22 | End: 2019-05-22 | Stop reason: HOSPADM

## 2019-05-20 RX ORDER — ONDANSETRON 4 MG/1
4 TABLET, ORALLY DISINTEGRATING ORAL EVERY 30 MIN PRN
Status: DISCONTINUED | OUTPATIENT
Start: 2019-05-20 | End: 2019-05-22 | Stop reason: HOSPADM

## 2019-05-20 RX ORDER — BUPIVACAINE HYDROCHLORIDE 2.5 MG/ML
INJECTION, SOLUTION EPIDURAL; INFILTRATION; INTRACAUDAL PRN
Status: DISCONTINUED | OUTPATIENT
Start: 2019-05-20 | End: 2019-05-20

## 2019-05-20 RX ORDER — OXYTOCIN 10 [USP'U]/ML
10 INJECTION, SOLUTION INTRAMUSCULAR; INTRAVENOUS
Status: DISCONTINUED | OUTPATIENT
Start: 2019-05-20 | End: 2019-05-22 | Stop reason: HOSPADM

## 2019-05-20 RX ORDER — AMOXICILLIN 250 MG
2 CAPSULE ORAL 2 TIMES DAILY PRN
Status: DISCONTINUED | OUTPATIENT
Start: 2019-05-20 | End: 2019-05-22 | Stop reason: HOSPADM

## 2019-05-20 RX ORDER — OXYTOCIN/0.9 % SODIUM CHLORIDE 30/500 ML
PLASTIC BAG, INJECTION (ML) INTRAVENOUS
Status: DISPENSED
Start: 2019-05-20 | End: 2019-05-21

## 2019-05-20 RX ORDER — OXYTOCIN/0.9 % SODIUM CHLORIDE 30/500 ML
100 PLASTIC BAG, INJECTION (ML) INTRAVENOUS CONTINUOUS
Status: DISCONTINUED | OUTPATIENT
Start: 2019-05-20 | End: 2019-05-22 | Stop reason: HOSPADM

## 2019-05-20 RX ORDER — OXYCODONE HYDROCHLORIDE 5 MG/1
5-10 TABLET ORAL
Status: DISCONTINUED | OUTPATIENT
Start: 2019-05-20 | End: 2019-05-22 | Stop reason: HOSPADM

## 2019-05-20 RX ORDER — LANOLIN 100 %
OINTMENT (GRAM) TOPICAL
Status: DISCONTINUED | OUTPATIENT
Start: 2019-05-20 | End: 2019-05-22 | Stop reason: HOSPADM

## 2019-05-20 RX ORDER — CEFAZOLIN SODIUM 1 G/3ML
1 INJECTION, POWDER, FOR SOLUTION INTRAMUSCULAR; INTRAVENOUS SEE ADMIN INSTRUCTIONS
Status: DISCONTINUED | OUTPATIENT
Start: 2019-05-20 | End: 2019-05-20 | Stop reason: HOSPADM

## 2019-05-20 RX ORDER — ACETAMINOPHEN 325 MG/1
975 TABLET ORAL EVERY 8 HOURS
Status: DISCONTINUED | OUTPATIENT
Start: 2019-05-20 | End: 2019-05-22 | Stop reason: HOSPADM

## 2019-05-20 RX ORDER — CARBOPROST TROMETHAMINE 250 UG/ML
250 INJECTION, SOLUTION INTRAMUSCULAR
Status: DISCONTINUED | OUTPATIENT
Start: 2019-05-20 | End: 2019-05-22 | Stop reason: HOSPADM

## 2019-05-20 RX ORDER — OXYTOCIN/0.9 % SODIUM CHLORIDE 30/500 ML
340 PLASTIC BAG, INJECTION (ML) INTRAVENOUS CONTINUOUS PRN
Status: DISCONTINUED | OUTPATIENT
Start: 2019-05-20 | End: 2019-05-22 | Stop reason: HOSPADM

## 2019-05-20 RX ORDER — KETOROLAC TROMETHAMINE 30 MG/ML
30 INJECTION, SOLUTION INTRAMUSCULAR; INTRAVENOUS EVERY 6 HOURS
Status: COMPLETED | OUTPATIENT
Start: 2019-05-20 | End: 2019-05-21

## 2019-05-20 RX ADMIN — BUPIVACAINE HYDROCHLORIDE IN DEXTROSE 1.6 ML: 7.5 INJECTION, SOLUTION SUBARACHNOID at 10:42

## 2019-05-20 RX ADMIN — CEFAZOLIN SODIUM 2 G: 2 INJECTION, SOLUTION INTRAVENOUS at 10:52

## 2019-05-20 RX ADMIN — BUPIVACAINE HYDROCHLORIDE 20 ML: 2.5 INJECTION, SOLUTION EPIDURAL; INFILTRATION; INTRACAUDAL at 12:22

## 2019-05-20 RX ADMIN — SIMETHICONE CHEW TAB 80 MG 80 MG: 80 TABLET ORAL at 18:03

## 2019-05-20 RX ADMIN — KETOROLAC TROMETHAMINE 30 MG: 30 INJECTION, SOLUTION INTRAMUSCULAR; INTRAVENOUS at 20:09

## 2019-05-20 RX ADMIN — SODIUM CHLORIDE, POTASSIUM CHLORIDE, SODIUM LACTATE AND CALCIUM CHLORIDE: 600; 310; 30; 20 INJECTION, SOLUTION INTRAVENOUS at 09:54

## 2019-05-20 RX ADMIN — SODIUM CHLORIDE, SODIUM LACTATE, POTASSIUM CHLORIDE, CALCIUM CHLORIDE AND DEXTROSE MONOHYDRATE: 5; 600; 310; 30; 20 INJECTION, SOLUTION INTRAVENOUS at 19:21

## 2019-05-20 RX ADMIN — KETOROLAC TROMETHAMINE 30 MG: 30 INJECTION, SOLUTION INTRAMUSCULAR; INTRAVENOUS at 13:08

## 2019-05-20 RX ADMIN — ONDANSETRON 4 MG: 2 INJECTION INTRAMUSCULAR; INTRAVENOUS at 11:08

## 2019-05-20 RX ADMIN — PHENYLEPHRINE HYDROCHLORIDE 0.5 MCG/KG/MIN: 10 INJECTION, SOLUTION INTRAMUSCULAR; INTRAVENOUS; SUBCUTANEOUS at 10:42

## 2019-05-20 RX ADMIN — SODIUM CHLORIDE, POTASSIUM CHLORIDE, SODIUM LACTATE AND CALCIUM CHLORIDE 1000 ML: 600; 310; 30; 20 INJECTION, SOLUTION INTRAVENOUS at 09:13

## 2019-05-20 RX ADMIN — SENNOSIDES AND DOCUSATE SODIUM 2 TABLET: 8.6; 5 TABLET ORAL at 20:08

## 2019-05-20 RX ADMIN — BUPIVACAINE 20 ML: 13.3 INJECTION, SUSPENSION, LIPOSOMAL INFILTRATION at 12:22

## 2019-05-20 RX ADMIN — FENTANYL CITRATE 15 MCG: 50 INJECTION, SOLUTION INTRAMUSCULAR; INTRAVENOUS at 10:42

## 2019-05-20 RX ADMIN — MORPHINE SULFATE 0.1 MG: 1 INJECTION, SOLUTION EPIDURAL; INTRATHECAL; INTRAVENOUS at 10:42

## 2019-05-20 RX ADMIN — ACETAMINOPHEN 975 MG: 325 TABLET, FILM COATED ORAL at 18:03

## 2019-05-20 RX ADMIN — Medication 30 ML: at 10:21

## 2019-05-20 RX ADMIN — OXYTOCIN-SODIUM CHLORIDE 0.9% IV SOLN 30 UNIT/500ML 600 ML/HR: 30-0.9/5 SOLUTION at 11:03

## 2019-05-20 RX ADMIN — SODIUM CITRATE AND CITRIC ACID MONOHYDRATE 30 ML: 500; 334 SOLUTION ORAL at 10:21

## 2019-05-20 RX ADMIN — SODIUM CHLORIDE, POTASSIUM CHLORIDE, SODIUM LACTATE AND CALCIUM CHLORIDE: 600; 310; 30; 20 INJECTION, SOLUTION INTRAVENOUS at 10:12

## 2019-05-20 NOTE — DISCHARGE SUMMARY
[facility] Discharge Summary    Ramona Hill MRN# [id number]   Age: 38 year old YOB: 1980     Date of Admission:  2019  Date of Discharge:  2019  Admitting Physician:  Sofía Aguilar MD  Discharge Physician:  Sofía Aguilar MD    Admit Dx:   - Intrauterine pregnancy at 39w0d  - Hx CS x3  - AMA    Discharge Dx:  - Same as above, s/p repeat low transverse  section  - Acute blood loss anemia    Procedures:  - Repeat low transverse  section with double layer uterine closure via Pfannenstiel incision  - Spinal analgesia    Admit HPI:  Ramona Hill is a 38 year old  at 39w0d admitted for repeat  section.  The risks, benefits, and alternatives of  section were discussed with the patient, and she agreed to proceed.     Please see her admit H&P for full details of her PMH, PSH, Meds, Allergies and exam on admit.    Operative Course:  Surgery was uncomplicated. QBL from the delivery was 394cc. Please see her  Section Operative Note for full details regarding her delivery.    Operative Findings:   1. Clear amniotic fluid  2. Liveborn female infant in cephalic presentation. Apgars 9/9 and weight 3657g.   3. Normal uterus, fallopian tubes, and ovaries.   4. Moderate adherence of bladder to anterior uterus taken down with bladder flap. Moderate rectofascial adhesions.       Postoperative Course:  Her postoperative course was uncomplicated. On POD#2, she was meeting all of her postpartum goals and deemed stable for discharge. She was voiding without difficulty, tolerating a regular diet without nausea and vomiting, her pain was well controlled on oral pain medicines and her lochia was appropriate. Her hemoglobin prior to delivery was 12.0 and after delivery was 9.5. Her Rh status was positive and Rhogam was not indicated.    Discharge Medications:     Review of your medicines      START taking      Dose / Directions    acetaminophen 325 MG tablet  Commonly known as:  TYLENOL  Used for:  Status post       Dose:  650 mg  Take 2 tablets (650 mg) by mouth every 6 hours as needed for mild pain Start after Delivery.  Quantity:  100 tablet  Refills:  0     ibuprofen 600 MG tablet  Commonly known as:  ADVIL/MOTRIN  Used for:  Status post       Dose:  600 mg  Take 1 tablet (600 mg) by mouth every 6 hours as needed for moderate pain Start after delivery  Quantity:  60 tablet  Refills:  0     oxyCODONE 5 MG tablet  Commonly known as:  ROXICODONE      Dose:  5 mg  Take 1 tablet (5 mg) by mouth every 6 hours as needed for pain  Quantity:  6 tablet  Refills:  0     senna-docusate 8.6-50 MG tablet  Commonly known as:  SENOKOT-S/PERICOLACE  Used for:  Status post       Dose:  1 tablet  Take 1 tablet by mouth daily Start after delivery.  Quantity:  100 tablet  Refills:  0        CONTINUE these medicines which have NOT CHANGED      Dose / Directions   ferrous gluconate 324 (38 Fe) MG tablet  Commonly known as:  FERGON  Used for:  High-risk pregnancy, elderly multigravida, unspecified trimester      Dose:  324 mg  Take 1 tablet (324 mg) by mouth daily (with breakfast)  Quantity:  60 tablet  Refills:  3     prenatal multivitamin w/iron 27-0.8 MG tablet  Used for:  AMA (advanced maternal age) multigravida 35+      Dose:  1 tablet  Take 1 tablet by mouth daily  Quantity:  100 tablet  Refills:  3     VITAMIN D PO      Refills:  0           Where to get your medicines      These medications were sent to [facility] [place], [place] - [address]  [address] [address]    Phone:  [phone]     acetaminophen 325 MG tablet    ibuprofen 600 MG tablet    senna-docusate 8.6-50 MG tablet     Some of these will need a paper prescription and others can be bought over the counter. Ask your nurse if you have questions.    Bring a paper prescription for each of these medications    oxyCODONE  5 MG tablet           Discharge/Disposition:  Ramona Hill was discharged to home in stable condition with the following instructions/medications:  1) Call for temperature > 100.4, bright red vaginal bleeding >1 pad an hour x 2 hours, foul smelling vaginal discharge, pain not controlled by usual oral pain meds, persistent nausea and vomiting not controlled on medications, drainage or redness from incision site  2) She desired nexplanon for contraception.  3) For feeding she decided to breastfeed (did formula feed while in hospital).  4) She was instructed to follow-up with her primary OB in 6 weeks for a routine postpartum visit and nexplanon insertion.  5) Discharge activity:  No heavy lifting >15 lbs or strenuous activity for 6 weeks, pelvic rest for 6 weeks, no driving or operating machinery while on narcotics.    Yamilka Young MD PGY3  05/22/19 6:12 AM       Physician Attestation   I, Sofía Aguilar, have seen and examined this patient.  I have reviewed the above note and agree with discharge plan as documented in the above note.     Sofía Aguilar MD  May 22, 2019

## 2019-05-20 NOTE — OP NOTE
[facility]  Full Operative Progress Note     Surgery Date:  2019  Surgeon:  Sofía Aguilar MD  Assistants:  MD Malka Saba MD PGY-2    José Miguel Cole MS4  Pre-op Diagnosis:  1. Intrauterine pregnancy at 39w0d     2. AMA     3. Hx CS x2    Post-op Diagnosis:  1. Same      2. Liveborn female infant   Procedure:  Repeat low-transverse  section with double layer uterine closure via Pfannenstiel incision    Anesthesia: Spinal  EBL:  394 mL  IVF:  800 mL crystalloid  UOP:  150 mL clear urine at the end of the case  Drains: Ibanez Catheter   Specimens:  Cord blood  Complications: None   Indications:   Ramona Hill is a 38 year old  at 39w0d admitted for repeat  section.  The risks, benefits, and alternatives of  section were discussed with the patient, and she agreed to proceed. She declined a PPTL.      Findings:     1. Clear amniotic fluid  2. Liveborn female infant in cephalic presentation. Apgars and weight pending.   3. Normal uterus, fallopian tubes, and ovaries.   4. Moderate adherence of bladder to anterior uterus taken down with bladder flap. Moderate rectofascial adhesions.     Procedure Details:   The patient was brought to the OR, where adequate spinal anesthesia was administered.  She was placed in the dorsal supine position with a slight leftward tilt. She was prepped and draped in the usual sterile fashion. A surgical time out was performed. A pfannenstiel skin incision was made with the scalpel, and carried down to the underlying fascia with sharp and blunt dissection. The fascia was incised in the midline, and the incision was extended laterally with the Gee scissors. The superior aspect of the fascia was grasped with the Kocher clamps and dissected off of the underlying rectus muscles with blunt and sharp dissection.The peritoneum was entered during this dissection. Attention was then turned to the inferior aspect  of the fascia, which was similarly dissected off of the underlying rectus muscles. The peritoneal opening was extended with electrocautery to create adequate space. The bladder blade was placed. The vesicouterine peritoneum was incised in the midline, and the incision was extended laterally with the Metzenbaum scissors. A bladder flap was created digitally and the bladder blade was replaced. A transverse hysterotomy was made with the scalpel in the lower uterine segment, and the incision was extended with digital pressure. The infant was noted to be in the cephalic position, and was delivered atraumatically. The shoulders delivered easily. No nuchal cord was noted. The cord was doubly clamped and cut after a 1 minute delay, and the infant was handed off to the awaiting nursery staff. A segment of cord was cut and handed off. The placenta was delivered with gentle traction on the umbilical cord and uterine massage. The uterus was exteriorized and cleared of all clots and debris. Uterine tone was noted to be firm with pitocin given through the running IV and uterine massage.  The hysterotomy was closed with a running locked suture of 0 Monocryl.  The hysterotomy was then imbricated using an 0 Monocryl suture. The hysterotomy was noted to be hemostatic. The posterior cul-de-sac wascleared of all clots and debris. The uterus was returned to the abdomen. The pericolic gutters were irrigated and cleared of all clots and debris. The hysterotomy was reexamined and noted to be hemostatic. The fascia and rectus muscles were examined and areas of oozing were controlled with electrocautery. Seprafilm was placed over the hysterotomy and over the rectus muscles.  The fascia was closed with a running 0 Vicryl suture. The subcutaneous tissue was irrigated and areas of oozing were controlled with electrocautery. The subcutaneous tissue was closed with 3-0 plain gut in a running suture. The skin was closed with 4-0 Monocryl and  covered with a sterile dressing.    All sponge, needle, and instrument counts were correct. The patient tolerated the procedure well, and was transferred to recovery in stable condition. Dr. Aguilar was present and scrubbed for the entirety of the procedure.     Malka Mustafa MD  OB GYN PGY-2  5/20/2019, 11:55 AM    Physician Attestation   I was present for the entire procedure between opening and closing. Due to the adhesions, it was necessary for Dr Cueva to scrub in and assist with the key portions of the case.  I have reviewed and edited the above operative report to reflect the exact findings and details of the surgical procedure.    Sofía Aguilar MD

## 2019-05-20 NOTE — PLAN OF CARE
[facility] at 0834.  Ramona denies leaking of fluid, contractions, bleeding, upper epigastric pain, nausea, vomiting, and visual disturbances. Baby is active.  Afebrile.  VSS.  EFM applied. Baby with minimal variability and accels. Pt repositioned on her left side, IV started, bolus given, and baby with moderate variability and accels. Dr. Mustafa and Dr. Aguilar notified of patient's arrival. Pt and spouse are excited to meet there baby.

## 2019-05-20 NOTE — PLAN OF CARE
Baby girl delivered at 1105. Ramona and dad are delighted and bonding well with their baby. In the PACU, Ramona had some lower BP's (75/52-94/54), but denies light headedness or nausea. Afebrile.  Lungs clear. Fundus firm at U/1 with scant lochia. Ibanez has good output. Ramona denies pain, but Toradol given preventatively.  Ramona and baby bonding well. Plan is for Ramona to transfer to [facility].

## 2019-05-20 NOTE — ANESTHESIA PROCEDURE NOTES
Peripheral Nerve Block Procedure Note    Staff:     Anesthesiologist:  Mari Stock MD    Resident/CRNA:  Ramona Hdz MD    Block performed by resident/CRNA in the presence of a teaching physician    Location: OR AFTER induction  Procedure Start/Stop TImes:      5/20/2019 12:14 PM     5/20/2019 12:24 PM    patient identified, IV checked, site marked, risks and benefits discussed, informed consent, monitors and equipment checked, pre-op evaluation, at physician/surgeon's request and post-op pain management      Correct Patient: Yes      Correct Position: Yes      Correct Site: Yes      Correct Procedure: Yes      Correct Laterality:  Yes    Site Marked:  Yes  Procedure details:     Procedure:  TAP    ASA:  2    Laterality:  Bilateral    Position:  Supine    Sterile Prep: chloraprep, mask and sterile gloves      Needle:  Touhy needle    Ultrasound: Yes      Ultrasound used to identify targeted nerve, plexus, or vascular structure and placed a needle adjacent to it      Permanent Image entered into patiient's record      Abnormal pain on injection: No      Blood Aspirated: No      Paresthesias:  No    Bleeding at site: No      Bolus via:  Needle    Infusion Method:  Single Shot    Blood aspirated via catheter: No      Complications:  None

## 2019-05-20 NOTE — PLAN OF CARE
Pt arrived on [facility] at 1415, via zoom cart with infant in her arms, accompanied by a female family member and K[place] . Tolerated transfer well, VSS. Pt reports negligible pain and is breastfeeding infant independently. Bands double checked and verified with RN. Will continue with plan of care.

## 2019-05-20 NOTE — ANESTHESIA CARE TRANSFER NOTE
Patient: Ramona Hill    Procedure(s):   SECTION    Diagnosis: Repeat Cearean Section  Diagnosis Additional Information: No value filed.    Anesthesia Type:   No value filed.     Note:  Airway :Room Air  Patient transferred to:PACU  Comments: Vss, sats 100% on ra, No adverse anesthesia events.    Handoff Report: Identifed the Patient, Identified the Reponsible Provider, Reviewed the pertinent medical history, Discussed the surgical course, Reviewed Intra-OP anesthesia mangement and issues during anesthesia, Set expectations for post-procedure period and Allowed opportunity for questions and acknowledgement of understanding      Vitals: (Last set prior to Anesthesia Care Transfer)    CRNA VITALS  2019 1132 - 2019 1204      2019             Resp Rate (observed):  1  (Abnormal)                 Electronically Signed By: Mari Stock MD  May 20, 2019  12:04 PM

## 2019-05-20 NOTE — ANESTHESIA PROCEDURE NOTES
Spinal/LP Procedure Note    Spinal Block  Staff:     Anesthesiologist:  Bautista Bowden DO  Location: OB  Procedure Start/Stop Times:      5/20/2019 10:30 AM     5/20/2019 10:40 AM    patient identified, IV checked, site marked, risks and benefits discussed, informed consent, monitors and equipment checked, pre-op evaluation, at physician/surgeon's request and post-op pain management      Correct Patient: Yes      Correct Position: Yes      Correct Site: Yes      Correct Procedure: Yes      Correct Laterality:  Yes    Site Marked:  Yes  Procedure:     Procedure:  Intrathecal    ASA:  2    Position:  Sitting    Sterile Prep: Betadine, mask and sterile gloves      Insertion site:  L3-4    Approach:  Midline    Needle gauge (G):  25    Local Skin Infiltration:  1% lidocaine    amount (ml):  3    Needle Length (in):  4    Introducer used: Yes      Attempts:  2    Redirects:  2    CSF:  Clear    Paresthesias:  No  Assessment/Narrative:     Sensory Level:  T4     0.75% bupivicaine with dextrose given. 1.6 ml with 0.15mg duromorph and 15 mcg fentanyl.

## 2019-05-20 NOTE — PLAN OF CARE
Data: Ramona Hill transferred to 73 via wheelchair at 0200. Baby transferred via parent's arms.  Action: Receiving unit notified of transfer: Yes. Patient and family notified of room change. Report given to Jasmyne at 0155. Belongings sent to receiving unit. Accompanied by Registered Nurse. Oriented patient to surroundings. Call light within reach. ID bands double-checked with receiving RN.  Response: Patient tolerated transfer and is stable.

## 2019-05-20 NOTE — ANESTHESIA PREPROCEDURE EVALUATION
Anesthesia Pre-Procedure Evaluation    Patient: Ramona Hill   MRN:     [id number] Gender:   female   Age:    38 year old :      1980        Preoperative Diagnosis: Repeat Cearean Section   Procedure(s):   SECTION     Past Medical History:   Diagnosis Date     NO ACTIVE PROBLEMS       Past Surgical History:   Procedure Laterality Date      SECTION N/A 2016    Procedure:  SECTION;  Surgeon: Tammie Colon MD;  Location: [facility] L[facility]      SECTION N/A 2017    Procedure:  SECTION;  Repeat  Section ;  Surgeon: Zo Fox MD;  Location: [facility] L+[facility]          Anesthesia Evaluation     .             ROS/MED HX    ENT/Pulmonary:  - neg pulmonary ROS     Neurologic:  - neg neurologic ROS     Cardiovascular:  - neg cardiovascular ROS       METS/Exercise Tolerance:     Hematologic:  - neg hematologic  ROS       Musculoskeletal:  - neg musculoskeletal ROS       GI/Hepatic:  - neg GI/hepatic ROS       Renal/Genitourinary:  - ROS Renal section negative       Endo:  - neg endo ROS       Psychiatric:  - neg psychiatric ROS       Infectious Disease:  - neg infectious disease ROS       Malignancy:         Other:                         PHYSICAL EXAM:   Mental Status/Neuro:    Airway: Facies: Feasible  Mallampati: II  Mouth/Opening: Full  TM distance: > 6 cm  Neck ROM: Full   Respiratory: Auscultation: CTAB     Resp. Rate: Normal     Resp. Effort: Normal      CV: Rhythm: Regular  Rate: Age appropriate  Heart: Normal Sounds   Comments:      Dental: Normal                  Lab Results   Component Value Date    WBC 11.3 (H) 2019    HGB 12.0 2019    HCT 36.0 2019     (L) 2019     2018    POTASSIUM 3.9 2018    CHLORIDE 110 (H) 2018    CO2 26 2018    BUN 13 2018    CR 0.67 2018    GLC 74 2018    ROCKY 8.6 2018    TSH 1.80 03/15/2019       Preop Vitals  BP Readings from Last 3 Encounters:  "  05/16/19 115/70   05/09/19 102/67   04/25/19 110/66    Pulse Readings from Last 3 Encounters:   05/16/19 91   05/09/19 76   04/25/19 99      Resp Readings from Last 3 Encounters:   04/24/18 14   12/25/17 16   07/16/17 20    SpO2 Readings from Last 3 Encounters:   05/09/19 97%   04/12/19 100%   02/21/19 98%      Temp Readings from Last 1 Encounters:   05/09/19 36.1  C (96.9  F) (Oral)    Ht Readings from Last 1 Encounters:   05/09/19 1.518 m (4' 11.75\")      Wt Readings from Last 1 Encounters:   05/16/19 66.2 kg (146 lb)    Estimated body mass index is 28.75 kg/m  as calculated from the following:    Height as of 5/9/19: 1.518 m (4' 11.75\").    Weight as of 5/16/19: 66.2 kg (146 lb).     LDA:            Assessment:   ASA SCORE: 2    NPO Status: Increased aspiration risk   Documentation: H&P complete     Tobacco Use:  NO Active use of Tobacco/UNKNOWN Tobacco use status     Plan:   Anes. Type:  Regional; MAC     RA Details:  Labor/OB Procedure; SS     RA-Location/Type: Spinal   Pre-Induction: None     Drips/Meds-Preparation: Oxytocin; Phenylephrine   Induction:  Not applicable   Airway: Native Airway   Access/Monitoring: PIV   Maintenance: N/a   Emergence: Not Applicable   Logistics: Observation/Admission     Postop Pain/Sedation Strategy:  Standard-Options: Block SS     PONV Management:  Adult Risk Factors: Female, Non-Smoker  Prevention: Ondansetron     CONSENT:      Blood Products: Consented (ALL Blood Products)                         Mari Stock MD  "

## 2019-05-20 NOTE — ANESTHESIA POSTPROCEDURE EVALUATION
Anesthesia POST Procedure Evaluation    Patient: Ramona Hill   MRN:     [id number] Gender:   female   Age:    38 year old :      1980        Preoperative Diagnosis: Repeat Cearean Section   Procedure(s):   SECTION   Postop Comments: No value filed.       Anesthesia Type:  Not documented  No value filed.    Reportable Event: NO     PAIN: Uncomplicated   Sign Out status: Comfortable, Well controlled pain     PONV: No PONV   Sign Out status:  No Nausea or Vomiting     Neuro/Psych: Uneventful perioperative course   Sign Out Status: Preoperative baseline; Age appropriate mentation     Airway/Resp.: Uneventful perioperative course   Sign Out Status: Non labored breathing, age appropriate RR; Resp. Status within EXPECTED Parameters     CV: Uneventful perioperative course   Sign Out status: Appropriate BP and perfusion indices; Appropriate HR/Rhythm     Disposition:   Sign Out in:  PACU  Disposition:  Phase II; Home  Recovery Course: Uneventful  Follow-Up: Not required     Comments/Narrative:  Spinal receding as anticipated. Bilateral TAP blocks placed.           Last Anesthesia Record Vitals:  CRNA VITALS  2019 1132 - 2019 1232      2019             Resp Rate (observed):  1  (Abnormal)           Last PACU Vitals:  Vitals Value Taken Time   BP 88/55 2019  2:45 PM   Temp 36.7  C (98  F) 2019  2:45 PM   Pulse 69 2019  1:50 PM   Resp 14 2019  2:45 PM   SpO2 98 % 2019  2:45 PM   Temp src     NIBP     Pulse     SpO2     Resp     Temp     Ht Rate     Temp 2     Vitals shown include unvalidated device data.      Electronically Signed By: Ramona Hdz MD, May 20, 2019, 2:49 PM

## 2019-05-21 LAB — HGB BLD-MCNC: 9.5 G/DL (ref 11.7–15.7)

## 2019-05-21 PROCEDURE — 36415 COLL VENOUS BLD VENIPUNCTURE: CPT | Performed by: STUDENT IN AN ORGANIZED HEALTH CARE EDUCATION/TRAINING PROGRAM

## 2019-05-21 PROCEDURE — 25000128 H RX IP 250 OP 636: Performed by: STUDENT IN AN ORGANIZED HEALTH CARE EDUCATION/TRAINING PROGRAM

## 2019-05-21 PROCEDURE — 25000132 ZZH RX MED GY IP 250 OP 250 PS 637: Performed by: STUDENT IN AN ORGANIZED HEALTH CARE EDUCATION/TRAINING PROGRAM

## 2019-05-21 PROCEDURE — 85018 HEMOGLOBIN: CPT | Performed by: STUDENT IN AN ORGANIZED HEALTH CARE EDUCATION/TRAINING PROGRAM

## 2019-05-21 PROCEDURE — 12000001 ZZH R&B MED SURG/OB UMMC

## 2019-05-21 RX ORDER — AMOXICILLIN 250 MG
1 CAPSULE ORAL DAILY
Qty: 100 TABLET | Refills: 0 | Status: SHIPPED | OUTPATIENT
Start: 2019-05-21 | End: 2019-07-10

## 2019-05-21 RX ORDER — IBUPROFEN 600 MG/1
600 TABLET, FILM COATED ORAL EVERY 6 HOURS PRN
Qty: 60 TABLET | Refills: 0 | Status: SHIPPED | OUTPATIENT
Start: 2019-05-21 | End: 2021-06-11

## 2019-05-21 RX ORDER — ACETAMINOPHEN 325 MG/1
650 TABLET ORAL EVERY 6 HOURS PRN
Qty: 100 TABLET | Refills: 0 | Status: SHIPPED | OUTPATIENT
Start: 2019-05-21 | End: 2019-07-10

## 2019-05-21 RX ADMIN — ACETAMINOPHEN 975 MG: 325 TABLET, FILM COATED ORAL at 19:22

## 2019-05-21 RX ADMIN — ACETAMINOPHEN 975 MG: 325 TABLET, FILM COATED ORAL at 10:17

## 2019-05-21 RX ADMIN — SENNOSIDES AND DOCUSATE SODIUM 1 TABLET: 8.6; 5 TABLET ORAL at 20:51

## 2019-05-21 RX ADMIN — ACETAMINOPHEN 975 MG: 325 TABLET, FILM COATED ORAL at 02:09

## 2019-05-21 RX ADMIN — IBUPROFEN 800 MG: 800 TABLET ORAL at 15:02

## 2019-05-21 RX ADMIN — KETOROLAC TROMETHAMINE 30 MG: 30 INJECTION, SOLUTION INTRAMUSCULAR; INTRAVENOUS at 02:08

## 2019-05-21 RX ADMIN — IBUPROFEN 800 MG: 800 TABLET ORAL at 20:51

## 2019-05-21 RX ADMIN — SENNOSIDES AND DOCUSATE SODIUM 1 TABLET: 8.6; 5 TABLET ORAL at 10:18

## 2019-05-21 RX ADMIN — KETOROLAC TROMETHAMINE 30 MG: 30 INJECTION, SOLUTION INTRAMUSCULAR; INTRAVENOUS at 08:12

## 2019-05-21 ASSESSMENT — PAIN DESCRIPTION - DESCRIPTORS
DESCRIPTORS: BURNING;CRAMPING
DESCRIPTORS: BURNING;CRAMPING

## 2019-05-21 NOTE — PROVIDER NOTIFICATION
05/20/19 9820   Provider Notification   Provider Name/Title Dr. Grimes   Method of Notification Electronic Page   Request Evaluate-Remote   Notification Reason Status Update  (incision update)     FYI - Incision dressing had peeled away from right side and medium amount of drainage pooled in incision. Steristrips intact. When checking fundus, no new drainage. Removed dressing, as it was hanging off. Will continue to monitor. Alexus VILLA

## 2019-05-21 NOTE — PLAN OF CARE
[id number]:  VSS and postpartum assessments WDL.  Up ad johny with steady gait, walking in the halls with family.  Independent with cares.  Bonding well with infant.  Breastfeeding on cue independently with good latch observed.  Provided education and reminders to wake baby up every 3-4 hours for feeds.  Pain managed with tylenol and ibuprofen per MAR.  Incisional steristrips intact.  , mother and other children present and supportive.  Resting between cares.  Will continue with postpartum cares and education per plan of care.

## 2019-05-21 NOTE — PLAN OF CARE
Pt concerned about bloating and edema  Reassured her that this is normal. Pt walked around unit and activity encouraged. Pt bonding well with baby. Pts mother herre assisting with cares.

## 2019-05-21 NOTE — PLAN OF CARE
VSS and postpartum assessment WDL. Incision dressing had peeled back with medium amount of drainage pooled in incision. Steristrips still intact and no new oozing upon fundal check. Per provider's recommendation, covered with new dressing and watching dressing for new drainage. No new drainage noted overnight. Uterus firm, midline, U/1; scant lochia rubra. Tolerating regular diet. Adequate output in vargas catheter. Passing flatus, no BM yet. PIV saline locked. Taught hand expression and produced drops of colostrum; encouraged hand expressing after each feed. Breastfeeding independently. Patient reports being very tired because baby cluster fed all night. Patient chose to give formula overnight; educated on benefits of breast milk and risks of formula. Encouraged patient to pump if using formula in future to maintain milk supply. Bonding well with baby. Mother present at bedside. Continue with plan of cares.

## 2019-05-21 NOTE — PROGRESS NOTES
[facility]  Postpartum Note    Name:  Ramona Hill  MRN: [id number]     declined for this encounter.    S: Feeling well.  Pain well controlled.  Tolerating regular diet without n/v.  Ambulating without dizziness.  Ibanez in place, passing flatus, no bowel movement. Lochia similar to menstrual flow.  Planning on breast feeding but did give formula overnight as baby was fussy and acting hungry.     O:   Patient Vitals for the past 12 hrs:   BP Temp Temp src Heart Rate Resp SpO2   19 0304 95/57 98.6  F (37  C) Oral 78 16 --   19 2307 101/55 98.6  F (37  C) Oral 89 18 --   19 1924 99/54 98  F (36.7  C) Oral 84 18 97 %     I/O last 3 completed shifts:  In: 800 [I.V.:800]  Out: 1082 [Urine:610; Blood:472]  UOP 70ml/hr    Gen:  Resting comfortably, NAD  CV:  Regular rate and rhythm   Pulm:  Non-labored breathing. No cough or wheezing.   Abd:  Soft, appropriately tender to palpation, non-distended.  Fundus at 2 below the umbilicus, firm and non-tender.  Incision: c/d/i with overlying steristrips, no surrounding erythema or edema  Ext:  Non-tender, 1+ LE edema b/l    Hgb:   Recent Labs   Lab Test 19  1401   HGB 12.0       Assessment/Plan:  38 year old  on POD #1 s/p RLTCS d/t h/o 2 prior CS.  Continue with routine postpartum management.     Pain: Well-controlled with sched tylenol, toradol>ibuprofen, PRN oxy available  Hgb: 12.0>QBL 394mL> AM pending. VSS as noted above, asymptomatic.   GI:  BID Senna/Colace.  PRN Simethicone.   PPx:  Encouraged ambulation   Rh: Positive  Rubella: Immune  Feed: breastfeeding  BC: Did not discuss this AM, need  or  (as pt prefers him to interpret) for improved communication  Dispo: Plan for home on POD#2-3    Yamilka Young MD   OB/Gyn Resident, PGY3    Physician Attestation   IMiley, personally examined and evaluated this patient.  I discussed the patient with the medical student and/or  resident and care team, and agree with the assessment and plan of care as documented in the note of 05/21/19  [date].      I personally reviewed vital signs, medications, labs and exam.    Key findings: Incision CDI.   Complains mostly of bloating. No nausea. Tolerating PO, not super hungry yet. Ambulating, pain controlled but does feel some burning incisional pain. Feet swollen, symmetric.   Reviewed typical postpartum course.     Anticipate discharge tomorrow.   Seen with face to face .   Miley Michele MD  Date of Service (when I saw the patient): 05/21/19

## 2019-05-22 VITALS
SYSTOLIC BLOOD PRESSURE: 107 MMHG | OXYGEN SATURATION: 97 % | HEART RATE: 72 BPM | DIASTOLIC BLOOD PRESSURE: 72 MMHG | RESPIRATION RATE: 18 BRPM | TEMPERATURE: 98.2 F

## 2019-05-22 PROCEDURE — 25000132 ZZH RX MED GY IP 250 OP 250 PS 637: Performed by: STUDENT IN AN ORGANIZED HEALTH CARE EDUCATION/TRAINING PROGRAM

## 2019-05-22 RX ORDER — OXYCODONE HYDROCHLORIDE 5 MG/1
5 TABLET ORAL EVERY 6 HOURS PRN
Qty: 6 TABLET | Refills: 0 | Status: SHIPPED | OUTPATIENT
Start: 2019-05-22 | End: 2019-07-10

## 2019-05-22 RX ADMIN — IBUPROFEN 800 MG: 800 TABLET ORAL at 09:45

## 2019-05-22 RX ADMIN — IBUPROFEN 800 MG: 800 TABLET ORAL at 03:30

## 2019-05-22 RX ADMIN — ACETAMINOPHEN 975 MG: 325 TABLET, FILM COATED ORAL at 03:30

## 2019-05-22 RX ADMIN — ACETAMINOPHEN 975 MG: 325 TABLET, FILM COATED ORAL at 11:45

## 2019-05-22 NOTE — PLAN OF CARE
Pt vital signs and assessment findings normal. Pain managed with tylenol and ibuprofen. Up ad ojhny able to care fore self and baby. Breast and bottle formula feeding independently. Voiding and has had a bowel movement. Incision open to air no drainage. Bleeding scant, fundus firm. Supported by family and visitors and . Needs to complete EDS and birth certificate. Bonding well with baby, no concerns.

## 2019-05-22 NOTE — PROGRESS NOTES
[facility]  Postpartum Note    Name:  Ramona Hill  MRN: [id number]    POD#2 s/p RLTCS   via iPad used for contraception discussion, otherwise declined use.    S: Feeling well.  Pain well controlled.  Tolerating regular diet without n/v.  Ambulating without dizziness. Voiding, passing flatus, no bowel movement. Lochia similar to menstrual flow.  Breast feeding. Nexplanon for contraception. Desires discharge today  O:   Patient Vitals for the past 24 hrs:   BP Temp Temp src Heart Rate Resp   19 0130 102/69 98.8  F (37.1  C) Oral 85 16   19 2100 115/74 98.4  F (36.9  C) Oral 84 16   19 1715 105/64 97.9  F (36.6  C) Oral 77 16   19 0828 100/59 98.3  F (36.8  C) -- 75 16     Gen:  Resting comfortably, NAD  CV:  Regular rate and rhythm   Pulm:  Non-labored breathing. No cough or wheezing.   Abd:  Soft, appropriately tender to palpation, moderately distended.  Fundus at 2 below the umbilicus, firm and non-tender.  Incision: c/d/i with overlying steristrips, no surrounding erythema or edema  Ext:  Non-tender, 1+ LE edema b/l    Hgb:   Hemoglobin   Date Value Ref Range Status   2019 9.5 (L) 11.7 - 15.7 g/dL Final     Assessment/Plan:  38 year old  on POD #2 s/p RLTCS d/t h/o 2 prior CS, doing well.  Continue with routine postpartum management.     Pain: Well-controlled with sched tylenol, ibuprofen, PRN oxy available  Hgb: 12.0>QBL 394mL> 9.5. VSS as noted above, asymptomatic. D/c w/ PO iron  GI:  BID Senna/Colace.  PRN Simethicone.   PPx:  Encouraged ambulation   Rh: Positive  Rubella: Immune  Feed: breastfeeding  BC: Nexplanon @ 6wks  Dispo: Plan for home today    Yamilka Young MD   OB/Gyn Resident, PGY3      Physician Attestation   I, Sofía Aguilar, personally saw and evaluated Ramona Hill.  I have reviewed the above note and agree with details and plan for discharge.  Her pain has been well controlled in the hospital with ibuprofen and  tylenol but she would like a few oxycodone to go home with for just in case.  She has 3 little girls to care for without too much help so I agree with small prescription for oxy.    Sofía Aguilar MD  May 22, 2019

## 2019-05-22 NOTE — PLAN OF CARE
Patient taking care of self and infant independently. Breastfeeding independently, good latch observed. Also supplementing with formula, maternal choice. Postpartum checks wnl. Incision wnl. Sent with electric breast pump, educated on use and care of pump. Pt educated on discharge instructions and given written handout, verbalized understanding of instructions. Discharged with medications, patient educated on medications and given written copies as well.  Patient awaiting  to come by to pick her up.

## 2019-05-29 ENCOUNTER — TELEPHONE (OUTPATIENT)
Dept: INTERNAL MEDICINE | Facility: CLINIC | Age: 39
End: 2019-05-29

## 2019-05-29 NOTE — TELEPHONE ENCOUNTER
Panel Management Review      Patient has the following on her problem list: None      Composite cancer screening  Chart review shows that this patient is due/due soon for the following Pap Smear  Summary:    Patient is due/failing the following:   PAP and PHYSICAL    Action needed:   Patient needs office visit for physical and pap.    Type of outreach:    Sent [email or web address] message. and Sent letter.    Questions for provider review:    None                                                                                                                                    Shonda Oliver       Chart routed to none .

## 2019-05-29 NOTE — LETTER
[facility]  [address] [address]  [phone]  May 29, 2019    Ramona Hill  [address] [place] [address]    Dear Ramona,    We care about your health and based on a review of your medical records, recommend the the following, to better manage your health:      You are in particular need of attention regarding:  -Cervical Cancer Screening  -Wellness (Physical) Visit     I am recommending that you:     -schedule a WELLNESS (Physical) APPOINTMENT with me.   I will check fasting labs the same day - nothing to eat except water and meds for 8-10 hours prior.    -schedule a PAP SMEAR EXAM which is due.  Please disregard this reminder if you have had this exam elsewhere within the last year.  It would be helpful for us to have a copy of your recent pap smear report in our file so that we can best coordinate your care.    If you are under/uninsured, we recommend you contact the [facility]. They offer pap smears at no charge or on a sliding fee charge. You can schedule with them at [phone]. Please have them send us the results.      Here is a list of Health Maintenance topics that are due now or due soon:  Health Maintenance Due   Topic Date Due     Preventive Care Visit  1980     PHQ-2  01/01/2019     PAP  04/07/2019       Please call us at [phone] or [phone] (or use [employer]) to address the above recommendations.     Thank you for trusting [facility].  We appreciate the opportunity to serve you and look forward to supporting your healthcare needs in the future.    If you have (or plan to have) any of these tests done at a facility other than a [facility] or a [facility], please have the results from these tests sent to your primary physician at [facility].    Healthy Regards,    Christopher Loyd MD

## 2019-07-10 ENCOUNTER — PRENATAL OFFICE VISIT (OUTPATIENT)
Dept: OBGYN | Facility: CLINIC | Age: 39
End: 2019-07-10
Payer: COMMERCIAL

## 2019-07-10 VITALS
HEIGHT: 60 IN | OXYGEN SATURATION: 99 % | DIASTOLIC BLOOD PRESSURE: 67 MMHG | SYSTOLIC BLOOD PRESSURE: 99 MMHG | BODY MASS INDEX: 25.07 KG/M2 | WEIGHT: 127.7 LBS | HEART RATE: 91 BPM

## 2019-07-10 DIAGNOSIS — Z12.4 SCREENING FOR CERVICAL CANCER: ICD-10-CM

## 2019-07-10 DIAGNOSIS — Z01.818 PRE-PROCEDURAL EXAMINATION: ICD-10-CM

## 2019-07-10 PROBLEM — O34.219 PREVIOUS CESAREAN SECTION COMPLICATING PREGNANCY: Status: RESOLVED | Noted: 2018-11-21 | Resolved: 2019-07-10

## 2019-07-10 PROBLEM — O23.41 GROUP B STREPTOCOCCUS URINARY TRACT INFECTION AFFECTING PREGNANCY IN FIRST TRIMESTER: Status: RESOLVED | Noted: 2018-11-21 | Resolved: 2019-07-10

## 2019-07-10 PROBLEM — O09.529 AMA (ADVANCED MATERNAL AGE) MULTIGRAVIDA 35+: Status: RESOLVED | Noted: 2018-11-12 | Resolved: 2019-07-10

## 2019-07-10 PROBLEM — B95.1 GROUP B STREPTOCOCCUS URINARY TRACT INFECTION AFFECTING PREGNANCY IN FIRST TRIMESTER: Status: RESOLVED | Noted: 2018-11-21 | Resolved: 2019-07-10

## 2019-07-10 LAB — HGB BLD-MCNC: 12.6 G/DL (ref 11.7–15.7)

## 2019-07-10 PROCEDURE — G0145 SCR C/V CYTO,THINLAYER,RESCR: HCPCS | Performed by: OBSTETRICS & GYNECOLOGY

## 2019-07-10 PROCEDURE — 99207 ZZC POST PARTUM EXAM: CPT | Performed by: OBSTETRICS & GYNECOLOGY

## 2019-07-10 PROCEDURE — G0476 HPV COMBO ASSAY CA SCREEN: HCPCS | Performed by: OBSTETRICS & GYNECOLOGY

## 2019-07-10 PROCEDURE — 00000218 ZZHCL STATISTIC OBHBG - HEMOGLOBIN: Performed by: OBSTETRICS & GYNECOLOGY

## 2019-07-10 PROCEDURE — 36415 COLL VENOUS BLD VENIPUNCTURE: CPT | Performed by: OBSTETRICS & GYNECOLOGY

## 2019-07-10 RX ORDER — MISOPROSTOL 200 UG/1
200 TABLET ORAL ONCE
Qty: 1 TABLET | Refills: 0 | Status: SHIPPED | OUTPATIENT
Start: 2019-07-10 | End: 2019-08-15

## 2019-07-10 RX ORDER — PRENATAL VIT/IRON FUM/FOLIC AC 27MG-0.8MG
1 TABLET ORAL DAILY
Qty: 100 TABLET | Refills: 3 | Status: SHIPPED | OUTPATIENT
Start: 2019-07-10 | End: 2021-06-11

## 2019-07-10 RX ORDER — LORAZEPAM 0.5 MG/1
0.5 TABLET ORAL ONCE
Qty: 1 TABLET | Refills: 0 | Status: SHIPPED | OUTPATIENT
Start: 2019-07-10 | End: 2019-08-15

## 2019-07-10 ASSESSMENT — PATIENT HEALTH QUESTIONNAIRE - PHQ9: SUM OF ALL RESPONSES TO PHQ QUESTIONS 1-9: 0

## 2019-07-10 ASSESSMENT — MIFFLIN-ST. JEOR: SCORE: 1186.77

## 2019-07-10 NOTE — PROGRESS NOTES
"Chief Complaint   Patient presents with     Post Partum Exam       Initial BP 99/67 (BP Location: Left arm, Patient Position: Sitting, Cuff Size: Adult Regular)   Pulse 91   Ht 1.518 m (4' 11.75\")   Wt 57.9 kg (127 lb 11.2 oz)   SpO2 99%   Breastfeeding? Yes   BMI 25.15 kg/m   Estimated body mass index is 25.15 kg/m  as calculated from the following:    Height as of this encounter: 1.518 m (4' 11.75\").    Weight as of this encounter: 57.9 kg (127 lb 11.2 oz).  BP completed using cuff size: regular    Questioned patient about current smoking habits.  Pt. has never smoked.          The following HM Due: pap      The following patient reported/Care Every where data was sent to:  P ABSTRACT QUALITY INITIATIVES [[id number]]  n/a      patient has appointment for today and orders have been placed        SUBJECTIVE:  Ramona Hill is a 38 year old female   here for a postpartum visit.  She had a repeat  Section on 19 delivering a healthy baby girl  at term.  Mom and baby are doing well.     delivery complications:  No  breast feeding:  Yes  bladder problems:  No  bowel problems/hemorrhoids:  No  episiotomy/laceration/incision healed? Yes  vaginal flow:  None  [place]:  No  contraception:  IUD  emotional adjustment:  doing well and happy       Lab Results   Component Value Date    PAP NIL 2016      PHQ-9 SCORE 2018 2018 7/10/2019   PHQ-9 Total Score 0 0 0       OBJECTIVE:  Blood pressure 99/67, pulse 91, height 1.518 m (4' 11.75\"), weight 57.9 kg (127 lb 11.2 oz), SpO2 99 %, currently breastfeeding.   General - pleasant female in no acute distress.  Breast - no nodularity, asymmetry or nipple discharge bilaterally.  Abdomen - soft, nontender, nondistended, no hepatosplenomegaly.  Incision well healed   Pelvic - EG: normal adult female, BUS: within normal limits, Vagina: well rugated, no discharge, Cervix: no lesions or CMT, Uterus: firm, normal sized and nontender, " Adnexae: no masses or tenderness.  Rectovaginal - deferred.    ASSESSMENT:  normal postpartum exam  Desires IUD but typically does not tolerate pelvic exams well at all.  We discussed this at length today and that in order to have an IUD, she will need to allow me to place it.  discussed option of cytotec and ativan.  I think that will give me a much more likely chance of successfully placing an IUD.     PLAN:  Discussed placing IUD under US guidance given 3 c/s and no vag deliveries. Also will allow me to know it is correctly placed.   May resume normal activities without restrictions  Pap smear was  done today  Labs per orders      Sofía Aguilar MD

## 2019-07-12 LAB
COPATH REPORT: NORMAL
PAP: NORMAL

## 2019-07-15 LAB
FINAL DIAGNOSIS: NORMAL
HPV HR 12 DNA CVX QL NAA+PROBE: NEGATIVE
HPV16 DNA SPEC QL NAA+PROBE: NEGATIVE
HPV18 DNA SPEC QL NAA+PROBE: NEGATIVE
SPECIMEN DESCRIPTION: NORMAL
SPECIMEN SOURCE CVX/VAG CYTO: NORMAL

## 2019-07-31 ENCOUNTER — OFFICE VISIT (OUTPATIENT)
Dept: OBGYN | Facility: CLINIC | Age: 39
End: 2019-07-31
Attending: OBSTETRICS & GYNECOLOGY
Payer: COMMERCIAL

## 2019-07-31 ENCOUNTER — ANCILLARY PROCEDURE (OUTPATIENT)
Dept: ULTRASOUND IMAGING | Facility: CLINIC | Age: 39
End: 2019-07-31
Attending: OBSTETRICS & GYNECOLOGY
Payer: COMMERCIAL

## 2019-07-31 DIAGNOSIS — Z01.818 PRE-PROCEDURAL EXAMINATION: ICD-10-CM

## 2019-07-31 DIAGNOSIS — Z30.430 ENCOUNTER FOR IUD INSERTION: ICD-10-CM

## 2019-07-31 LAB — HCG UR QL: NEGATIVE

## 2019-07-31 PROCEDURE — 76830 TRANSVAGINAL US NON-OB: CPT | Performed by: OBSTETRICS & GYNECOLOGY

## 2019-07-31 PROCEDURE — 58300 INSERT INTRAUTERINE DEVICE: CPT | Performed by: OBSTETRICS & GYNECOLOGY

## 2019-07-31 PROCEDURE — 81025 URINE PREGNANCY TEST: CPT | Performed by: OBSTETRICS & GYNECOLOGY

## 2019-08-01 NOTE — PROGRESS NOTES
CC:  IUD insertion  HPI:  Ramona Hill  is a 38 year old  female   who is  here for an IUD insertion.   We discussed this IUD at the last visit and again today.  She would like the mirena IUD.  She is aware of the side effects and small but possible failure rate, and the risk for an ectopic pregnancy. Patient has verbalized understanding of risks and benefits and has signed the consent form.    Past GYN history:  No STD history       Last PAP smear:    Lab Results   Component Value Date    PAP NIL 07/10/2019    PAP NIL 2016      Manogomous relationship?  yes    Past Medical History:   Diagnosis Date     NO ACTIVE PROBLEMS         Past Surgical History:   Procedure Laterality Date      SECTION N/A 2016    Procedure:  SECTION;  Surgeon: Tammie Colon MD;  Location: [facility] L[facility]      SECTION N/A 2017    Procedure:  SECTION;  Repeat  Section ;  Surgeon: Zo Fox MD;  Location: [facility] L[facility]      SECTION N/A 2019    Procedure:  SECTION;  Surgeon: Sofía Aguilar MD;  Location: [facility] L[facility]        Current Outpatient Medications   Medication Sig Dispense Refill     Cholecalciferol (VITAMIN D PO)        ferrous gluconate (FERGON) 324 (38 Fe) MG tablet Take 1 tablet (324 mg) by mouth daily (with breakfast) (Patient not taking: Reported on 2019) 60 tablet 3     ibuprofen (ADVIL/MOTRIN) 600 MG tablet Take 1 tablet (600 mg) by mouth every 6 hours as needed for moderate pain Start after delivery (Patient not taking: Reported on 7/10/2019) 60 tablet 0     Prenatal Vit-Fe Fumarate-FA (PRENATAL MULTIVITAMIN W/IRON) 27-0.8 MG tablet Take 1 tablet by mouth daily 100 tablet 3        ROS:  C: NEGATIVE for fever, chills, change in weight  R: NEGATIVE for significant cough or SOB  CV: NEGATIVE for chest pain, palpitations or peripheral edema  GI: NEGATIVE for nausea, abdominal pain, heartburn, or change in bowel habits  : NEGATIVE for  frequency, dysuria, hematuria, vaginal discharge, or irregular bleeding      EXAM:     UPT  negative   General - pleasant female in no acute distress.  Pelvic - EG: normal adult female, BUS: within normal limits, Vagina: well rugated, no discharge, Cervix: no lesions or CMT, Uterus: firm, normal sized and nontender, Adnexae: no masses or tenderness.    PROCEDURE:  IUD insertion under ultrasound guidance.  Ultrasound performed by Breonna.   After informed consent was obtained from the patient, a bimanual exam was performed.   A speculum was placed in the vagina to visualize the cervix.  The cervix was then swabbed with a betadine prep.  Tenaculum was placed at the 12 o'clock position on the cervix and the uterus sounded to 7 cm.  The mirena  IUD was then placed in the usual fashion under sterile technique.  Strings were clipped about 2-3 cm from the cervical os.  Tenaculum was removed and cervix was hemostatic. There were no complications. The patient tolerated the procedure well.    LOT # see MA's note.       ASSESSMENT/PLAN:  Insertion of IUD    The patient should return to clinic for a speculum examination for confirmation that the IUD is in place in about 4 wks or after her next period, which ever is first. Bleeding pattern of this particular IUD was discussed with the patient. Warning signs of complications were also discussed.  She will call the office with excessive bleeding or severe pain.     Sofía Aguilar MD

## 2019-08-01 NOTE — PATIENT INSTRUCTIONS

## 2019-08-15 ENCOUNTER — OFFICE VISIT (OUTPATIENT)
Dept: OBGYN | Facility: CLINIC | Age: 39
End: 2019-08-15
Payer: COMMERCIAL

## 2019-08-15 VITALS
WEIGHT: 127.7 LBS | DIASTOLIC BLOOD PRESSURE: 66 MMHG | BODY MASS INDEX: 25.15 KG/M2 | SYSTOLIC BLOOD PRESSURE: 103 MMHG | HEART RATE: 83 BPM

## 2019-08-15 DIAGNOSIS — Z30.431 INTRAUTERINE DEVICE SURVEILLANCE: Primary | ICD-10-CM

## 2019-08-15 PROCEDURE — 99212 OFFICE O/P EST SF 10 MIN: CPT | Performed by: OBSTETRICS & GYNECOLOGY

## 2019-08-15 NOTE — NURSING NOTE
"Chief Complaint   Patient presents with     IUD     iud check, still bleeding since insertion       Initial /66   Pulse 83   Wt 57.9 kg (127 lb 11.2 oz)   BMI 25.15 kg/m   Estimated body mass index is 25.15 kg/m  as calculated from the following:    Height as of 7/10/19: 1.518 m (4' 11.75\").    Weight as of this encounter: 57.9 kg (127 lb 11.2 oz).  BP completed using cuff size: regular    Questioned patient about current smoking habits.  Pt. has never smoked.          The following HM Due: NONE      The following patient reported/Care Every where data was sent to:  P ABSTRACT QUALITY INITIATIVES [[id number]]        patient has appointment for today  Harika Euceda                "

## 2019-08-16 NOTE — PROGRESS NOTES
Ramona Hill is 38 year old female here for an IUD check.  She had a mirena IUD placed about one month ago, without complication.  Since then she has been doing well. Continues to have light spotting.  No cramping.  Breast feeding.     Lab Results   Component Value Date    PAP NIL 07/10/2019    PAP NIL 04/07/2016        OBJECTIVE:  /66   Pulse 83   Wt 57.9 kg (127 lb 11.2 oz)   BMI 25.15 kg/m     normal respiratory and cardiovascular effort     Pelvic:  EG - normal adult female.  BUS - within normal limits.  Vagina - well rugated, no discharge.  Cervix - no lesions, no CMT.   IUD strings noted at the cervix about 1 cm long.    ASSESSMENT:  IUD well placed    PLAN:  return to clinic when due for next annual physical exam  or with any concerns in regards to her IUD.  discussed needs yearly exam for IUD string check.   Sofía Aguilar MD

## 2019-09-14 ENCOUNTER — NURSE TRIAGE (OUTPATIENT)
Dept: NURSING | Facility: CLINIC | Age: 39
End: 2019-09-14

## 2019-09-15 NOTE — TELEPHONE ENCOUNTER
calls and says that his wife has an IUD and feels that her stomach is bloated. Pt. Thinks her stomach looks bloated, too.    Reason for Disposition    Worried that IUD is not in right place (e.g., not able to feel the IUD string, string longer than usual, can feel hard plastic of IUD)    Additional Information    Negative: Shock suspected (e.g., cold/pale/clammy skin, too weak to stand, low BP, rapid pulse)    Negative: Sounds like a life-threatening emergency to the triager    Negative: [1] Abdominal pain AND [2] pregnant < 20 weeks    Negative: [1] Vaginal bleeding AND [2] pregnant < 20 weeks    Negative: Vaginal discharge is main symptom    Negative: [1] SEVERE abdominal pain (e.g., excruciating) AND [2] present > 1 hour    Negative: SEVERE vaginal bleeding (i.e., soaking 2 pads or tampons per hour and present 2 or more hours)    Negative: Patient sounds very sick or weak to the triager    Negative: MODERATE vaginal bleeding (i.e., soaking 1 pad or tampon per hour and present > 6 hours)    Negative: [1] Constant abdominal pain AND [2] present > 2 hours    Negative: [1] Caller has URGENT question AND [2] triager unable to answer question    Negative: [1] MILD abdominal pain (e.g., does not interfere with normal activities) AND [2] pain comes and goes (cramps) AND [3] present > 48 hours    Negative: [1] Caller has NON-URGENT question AND [2] triager unable to answer question    Negative: Pregnant    Negative: [1] Periods with > 6 soaked pads or tampons per day AND [2] last > 7 days    Protocols used: CONTRACEPTION - IUD SYMPTOMS AND ENHGICETI-I-IL

## 2019-09-17 ENCOUNTER — TELEPHONE (OUTPATIENT)
Dept: OBGYN | Facility: CLINIC | Age: 39
End: 2019-09-17

## 2019-09-17 NOTE — TELEPHONE ENCOUNTER
Pt was recommended to come in for IUD check with Dr Aguilar in the next 3 days but there is nothing in the near future w/ that provider. Please advise.    Pt can be reached at [phone], writer recommends using an  as the pt did not seem to understand english.

## 2019-09-18 NOTE — TELEPHONE ENCOUNTER
I could see her on Friday when I am on call.  Just need to see how the morning looks and I can tell them in the morning what time to come in.

## 2019-09-20 ENCOUNTER — OFFICE VISIT (OUTPATIENT)
Dept: OBGYN | Facility: CLINIC | Age: 39
End: 2019-09-20
Payer: COMMERCIAL

## 2019-09-20 VITALS — BODY MASS INDEX: 24.62 KG/M2 | OXYGEN SATURATION: 97 % | HEART RATE: 97 BPM | WEIGHT: 125 LBS

## 2019-09-20 DIAGNOSIS — R14.0 ABDOMINAL BLOATING: ICD-10-CM

## 2019-09-20 DIAGNOSIS — Z30.431 INTRAUTERINE DEVICE SURVEILLANCE: Primary | ICD-10-CM

## 2019-09-20 PROCEDURE — 99213 OFFICE O/P EST LOW 20 MIN: CPT | Performed by: OBSTETRICS & GYNECOLOGY

## 2019-09-22 NOTE — PROGRESS NOTES
Ramona Hill is 39 year old female here for an IUD check.  She had a mirena IUD placed about 2 months ago, without complication.  Since then she has been doing well.   Light spotting.  Feels like her abd is distended.   Breastfeeding.      OBJECTIVE:  Pulse 97   Wt 56.7 kg (125 lb)   SpO2 97%   BMI 24.62 kg/m     normal respiratory and cardiovascular effort  Abd: soft, NT, ND, no palpable masses,    Incision well  Healed.    Pelvic:  EG - normal adult female.  BUS - within normal limits.  Vagina - well rugated, no discharge.  Cervix - no lesions, no CMT.   IUD strings noted at the cervix about 1 cm long.    ASSESSMENT:  IUD well placed  Abdominal distension     PLAN:  Patient reassured that her IUD is well located.  Abdominal discomfort she is noticing is likely not related to her IUD.  She just delivered her third baby so some changes to her abdominal wall may be normal.  The bloating could be related to her gastrointestinal system so we reviewed foods that may help with decreasing bloating.  return to clinic when due for next annual physical exam  or with any concerns in regards to her IUD.    Sofía Aguilar MD

## 2019-11-14 ENCOUNTER — ALLIED HEALTH/NURSE VISIT (OUTPATIENT)
Dept: NURSING | Facility: CLINIC | Age: 39
End: 2019-11-14
Payer: COMMERCIAL

## 2019-11-14 DIAGNOSIS — Z23 NEED FOR PROPHYLACTIC VACCINATION AND INOCULATION AGAINST INFLUENZA: Primary | ICD-10-CM

## 2019-11-14 PROCEDURE — 99207 ZZC NO CHARGE NURSE ONLY: CPT

## 2019-11-14 PROCEDURE — 90686 IIV4 VACC NO PRSV 0.5 ML IM: CPT

## 2019-11-14 PROCEDURE — 90471 IMMUNIZATION ADMIN: CPT

## 2020-09-18 ENCOUNTER — ALLIED HEALTH/NURSE VISIT (OUTPATIENT)
Dept: NURSING | Facility: CLINIC | Age: 40
End: 2020-09-18
Payer: COMMERCIAL

## 2020-09-18 DIAGNOSIS — Z23 NEED FOR PROPHYLACTIC VACCINATION AND INOCULATION AGAINST INFLUENZA: Primary | ICD-10-CM

## 2020-09-18 PROCEDURE — 90471 IMMUNIZATION ADMIN: CPT

## 2020-09-18 PROCEDURE — 99207 ZZC NO CHARGE NURSE ONLY: CPT

## 2020-09-18 PROCEDURE — 90686 IIV4 VACC NO PRSV 0.5 ML IM: CPT

## 2020-12-27 ENCOUNTER — HEALTH MAINTENANCE LETTER (OUTPATIENT)
Age: 40
End: 2020-12-27

## 2021-06-11 ENCOUNTER — OFFICE VISIT (OUTPATIENT)
Dept: FAMILY MEDICINE | Facility: CLINIC | Age: 41
End: 2021-06-11
Payer: COMMERCIAL

## 2021-06-11 VITALS
TEMPERATURE: 98.2 F | WEIGHT: 126.9 LBS | HEART RATE: 94 BPM | SYSTOLIC BLOOD PRESSURE: 108 MMHG | HEIGHT: 60 IN | OXYGEN SATURATION: 100 % | BODY MASS INDEX: 24.91 KG/M2 | DIASTOLIC BLOOD PRESSURE: 54 MMHG

## 2021-06-11 DIAGNOSIS — Z00.00 ROUTINE GENERAL MEDICAL EXAMINATION AT A HEALTH CARE FACILITY: Primary | ICD-10-CM

## 2021-06-11 DIAGNOSIS — Z30.09 ENCOUNTER FOR COUNSELING REGARDING CONTRACEPTION: ICD-10-CM

## 2021-06-11 PROBLEM — Z98.891 STATUS POST C-SECTION: Status: RESOLVED | Noted: 2017-12-22 | Resolved: 2021-06-11

## 2021-06-11 LAB
ALBUMIN SERPL-MCNC: 3.3 G/DL (ref 3.4–5)
ALP SERPL-CCNC: 81 U/L (ref 40–150)
ALT SERPL W P-5'-P-CCNC: 21 U/L (ref 0–50)
ANION GAP SERPL CALCULATED.3IONS-SCNC: 4 MMOL/L (ref 3–14)
AST SERPL W P-5'-P-CCNC: 15 U/L (ref 0–45)
BILIRUB SERPL-MCNC: 0.4 MG/DL (ref 0.2–1.3)
BUN SERPL-MCNC: 6 MG/DL (ref 7–30)
CALCIUM SERPL-MCNC: 8.6 MG/DL (ref 8.5–10.1)
CHLORIDE SERPL-SCNC: 106 MMOL/L (ref 94–109)
CO2 SERPL-SCNC: 28 MMOL/L (ref 20–32)
CREAT SERPL-MCNC: 0.61 MG/DL (ref 0.52–1.04)
ERYTHROCYTE [DISTWIDTH] IN BLOOD BY AUTOMATED COUNT: 12.5 % (ref 10–15)
GFR SERPL CREATININE-BSD FRML MDRD: >90 ML/MIN/{1.73_M2}
GLUCOSE SERPL-MCNC: 78 MG/DL (ref 70–99)
HCT VFR BLD AUTO: 34.9 % (ref 35–47)
HGB BLD-MCNC: 11.7 G/DL (ref 11.7–15.7)
MCH RBC QN AUTO: 30.8 PG (ref 26.5–33)
MCHC RBC AUTO-ENTMCNC: 33.5 G/DL (ref 31.5–36.5)
MCV RBC AUTO: 92 FL (ref 78–100)
PLATELET # BLD AUTO: 213 10E9/L (ref 150–450)
POTASSIUM SERPL-SCNC: 3.5 MMOL/L (ref 3.4–5.3)
PROT SERPL-MCNC: 7.9 G/DL (ref 6.8–8.8)
RBC # BLD AUTO: 3.8 10E12/L (ref 3.8–5.2)
SODIUM SERPL-SCNC: 138 MMOL/L (ref 133–144)
WBC # BLD AUTO: 9.7 10E9/L (ref 4–11)

## 2021-06-11 PROCEDURE — 85027 COMPLETE CBC AUTOMATED: CPT | Performed by: FAMILY MEDICINE

## 2021-06-11 PROCEDURE — 82306 VITAMIN D 25 HYDROXY: CPT | Performed by: FAMILY MEDICINE

## 2021-06-11 PROCEDURE — 36415 COLL VENOUS BLD VENIPUNCTURE: CPT | Performed by: FAMILY MEDICINE

## 2021-06-11 PROCEDURE — 86803 HEPATITIS C AB TEST: CPT | Performed by: FAMILY MEDICINE

## 2021-06-11 PROCEDURE — 80053 COMPREHEN METABOLIC PANEL: CPT | Performed by: FAMILY MEDICINE

## 2021-06-11 PROCEDURE — 99395 PREV VISIT EST AGE 18-39: CPT | Performed by: FAMILY MEDICINE

## 2021-06-11 ASSESSMENT — ENCOUNTER SYMPTOMS
ARTHRALGIAS: 0
PARESTHESIAS: 0
SORE THROAT: 0
DIZZINESS: 0
HEMATURIA: 0
HEARTBURN: 0
BREAST MASS: 0
HEMATOCHEZIA: 0
DYSURIA: 0
HEADACHES: 0
CONSTIPATION: 0
FREQUENCY: 0
COUGH: 0
CHILLS: 0
ABDOMINAL PAIN: 0
SHORTNESS OF BREATH: 0
EYE PAIN: 0
WEAKNESS: 0
NERVOUS/ANXIOUS: 0
FEVER: 0
PALPITATIONS: 0
MYALGIAS: 0
DIARRHEA: 0
JOINT SWELLING: 0
NAUSEA: 0

## 2021-06-11 ASSESSMENT — ANXIETY QUESTIONNAIRES
6. BECOMING EASILY ANNOYED OR IRRITABLE: NOT AT ALL
GAD7 TOTAL SCORE: 0
GAD7 TOTAL SCORE: 0
5. BEING SO RESTLESS THAT IT IS HARD TO SIT STILL: NOT AT ALL
7. FEELING AFRAID AS IF SOMETHING AWFUL MIGHT HAPPEN: NOT AT ALL
2. NOT BEING ABLE TO STOP OR CONTROL WORRYING: NOT AT ALL
7. FEELING AFRAID AS IF SOMETHING AWFUL MIGHT HAPPEN: NOT AT ALL
4. TROUBLE RELAXING: NOT AT ALL
3. WORRYING TOO MUCH ABOUT DIFFERENT THINGS: NOT AT ALL
1. FEELING NERVOUS, ANXIOUS, OR ON EDGE: NOT AT ALL
GAD7 TOTAL SCORE: 0

## 2021-06-11 ASSESSMENT — PATIENT HEALTH QUESTIONNAIRE - PHQ9
SUM OF ALL RESPONSES TO PHQ QUESTIONS 1-9: 0
SUM OF ALL RESPONSES TO PHQ QUESTIONS 1-9: 0
10. IF YOU CHECKED OFF ANY PROBLEMS, HOW DIFFICULT HAVE THESE PROBLEMS MADE IT FOR YOU TO DO YOUR WORK, TAKE CARE OF THINGS AT HOME, OR GET ALONG WITH OTHER PEOPLE: NOT DIFFICULT AT ALL

## 2021-06-11 ASSESSMENT — PAIN SCALES - GENERAL: PAINLEVEL: NO PAIN (0)

## 2021-06-11 ASSESSMENT — MIFFLIN-ST. JEOR: SCORE: 1171.49

## 2021-06-11 NOTE — PROGRESS NOTES
SUBJECTIVE:   CC: Ramona Hill is an 40 year old woman who presents for preventive health visit.     Here for a physical.      Concerns today:  None, would like to check IUD.  Placed 2018.  Would like to consider getting ParaGard, as Mirena seems to be causing weight gain.    Patient has been advised of split billing requirements and indicates understanding: Yes       Healthy Habits:     Getting at least 3 servings of Calcium per day:  Yes    Bi-annual eye exam:  NO    Dental care twice a year:  NO    Sleep apnea or symptoms of sleep apnea:  None    Diet:  Regular (no restrictions)    Frequency of exercise:  None    Taking medications regularly:  Yes    Medication side effects:  Not applicable    PHQ-2 Total Score: 0    Additional concerns today:  No         Today's PHQ-2 Score:   PHQ-2 ( 1999 Pfizer) 6/11/2021   Q1: Little interest or pleasure in doing things 0   Q2: Feeling down, depressed or hopeless 0   PHQ-2 Score 0   Q1: Little interest or pleasure in doing things Not at all   Q2: Feeling down, depressed or hopeless Not at all   PHQ-2 Score 0       Abuse: Current or Past (Physical, Sexual or Emotional) - No  Do you feel safe in your environment? Yes    Have you ever done Advance Care Planning? (For example, a Health Directive, POLST, or a discussion with a medical provider or your loved ones about your wishes): No, advance care planning information given to patient to review.  Patient declined advance care planning discussion at this time.    Social History     Tobacco Use     Smoking status: Never Smoker     Smokeless tobacco: Never Used   Substance Use Topics     Alcohol use: No     Alcohol/week: 0.0 standard drinks       Alcohol Use 6/11/2021   Prescreen: >3 drinks/day or >7 drinks/week? No     Reviewed orders with patient.  Reviewed health maintenance and updated orders accordingly - Yes  Labs reviewed in EPIC  BP Readings from Last 3 Encounters:   06/11/21 108/54   08/15/19 103/66   07/10/19 99/67     Wt Readings from Last 3 Encounters:   21 57.6 kg (126 lb 14.4 oz)   19 56.7 kg (125 lb)   08/15/19 57.9 kg (127 lb 11.2 oz)                  Patient Active Problem List   Diagnosis     Language barrier     Need for Tdap vaccination     Enlarged thyroid     S/P  section     Past Surgical History:   Procedure Laterality Date      SECTION N/A 2016    Procedure:  SECTION;  Surgeon: Tammie Colon MD;  Location: [facility]      SECTION N/A 2017    Procedure:  SECTION;  Repeat  Section ;  Surgeon: Zo Fox MD;  Location: [facility]      SECTION N/A 2019    Procedure:  SECTION;  Surgeon: Sofía Aguilar MD;  Location: [facility]       Social History     Tobacco Use     Smoking status: Never Smoker     Smokeless tobacco: Never Used   Substance Use Topics     Alcohol use: No     Alcohol/week: 0.0 standard drinks     Family History   Problem Relation Age of Onset     Family History Negative No family hx of          Current Outpatient Medications   Medication Sig Dispense Refill     levonorgestrel (MIRENA) 20 MCG/24HR IUD 1 each by Intrauterine route once       No Known Allergies    Breast Cancer Screening:    Breast CA Risk Assessment (FHS-7) 2021   Do you have a family history of breast, colon, or ovarian cancer? No / Unknown         Patient under 40 years of age: Routine Mammogram Screening not recommended.   Pertinent mammograms are reviewed under the imaging tab.    History of abnormal Pap smear: NO - age 30-65 PAP every 5 years with negative HPV co-testing recommended  PAP / HPV Latest Ref Rng & Units 7/10/2019 2016   PAP - NIL NIL   HPV 16 DNA NEG:Negative Negative -   HPV 18 DNA NEG:Negative Negative -   OTHER HR HPV NEG:Negative Negative -     Reviewed and updated as needed this visit by clinical staff  Tobacco  Allergies  Meds  Problems  Med Hx  Surg Hx  Fam Hx  Soc Hx          Reviewed and updated as  needed this visit by Provider  Tobacco   Meds  Problems  Med Hx    Soc Hx       Past Medical History:   Diagnosis Date     NO ACTIVE PROBLEMS      Postpartum anxiety 2016     Status post  2017      Past Surgical History:   Procedure Laterality Date      SECTION N/A 2016    Procedure:  SECTION;  Surgeon: Tammie Colon MD;  [facility]      SECTION N/A 2017    Procedure:  SECTION;  Repeat  Section ;  Surgeon: Zo Fox MD;  [facility]      SECTION N/A 2019    Procedure:  SECTION;  Surgeon: Sofía Aguilar MD;  [facility]: [facility]       Review of Systems   Constitutional: Negative for chills and fever.   HENT: Negative for congestion, ear pain, hearing loss and sore throat.    Eyes: Negative for pain and visual disturbance.   Respiratory: Negative for cough and shortness of breath.    Cardiovascular: Negative for chest pain, palpitations and peripheral edema.   Gastrointestinal: Negative for abdominal pain, constipation, diarrhea, heartburn, hematochezia and nausea.   Breasts:  Negative for tenderness, breast mass and discharge.   Genitourinary: Negative for dysuria, frequency, genital sores, hematuria, pelvic pain, urgency, vaginal bleeding and vaginal discharge.   Musculoskeletal: Negative for arthralgias, joint swelling and myalgias.   Skin: Negative for rash.   Neurological: Negative for dizziness, weakness, headaches and paresthesias.   Psychiatric/Behavioral: Negative for mood changes. The patient is not nervous/anxious.        Answers for HPI/ROS submitted by the patient on 2021   Annual Exam:  If you checked off any problems, how difficult have these problems made it for you to do your work, take care of things at home, or get along with other people?: Not difficult at all  PHQ9 TOTAL SCORE: 0  DONALDO 7 TOTAL SCORE: 0       OBJECTIVE:   /54 (BP Location: Right arm, Patient Position:  "Sitting, Cuff Size: Adult Regular)   Pulse 94   Temp 98.2  F (36.8  C) (Oral)   Ht 1.515 m (4' 11.65\")   Wt 57.6 kg (126 lb 14.4 oz)   LMP  (LMP Unknown)   SpO2 100%   BMI 25.08 kg/m    Physical Exam  GENERAL: healthy, alert and no distress  EYES: Eyes grossly normal to inspection, PERRL and conjunctivae and sclerae normal  HENT: ear canals and TM's normal, nose and mouth without ulcers or lesions  NECK: no adenopathy, no asymmetry, masses, or scars and thyroid normal to palpation  RESP: lungs clear to auscultation - no rales, rhonchi or wheezes  CV: regular rate and rhythm, normal S1 S2, no S3 or S4, no murmur, click or rub, no peripheral edema and peripheral pulses strong  ABDOMEN: soft, nontender, no hepatosplenomegaly, no masses and bowel sounds normal  MS: no gross musculoskeletal defects noted, no edema  SKIN: no suspicious lesions or rashes  NEURO: Normal strength and tone, mentation intact and speech normal  PSYCH: mentation appears normal, affect normal/bright    Diagnostic Test Results:  Labs reviewed in Epic    ASSESSMENT/PLAN:   1. Routine general medical examination at a health care facility  Exam completed today, routine health maintenance items updated as able.  Labs ordered.  Follow up one year or sooner as needed.  - Vitamin D Deficiency  - Hepatitis C Screen Reflex to HCV RNA Quant and Genotype  - CBC with platelets  - Comprehensive metabolic panel (BMP + Alb, Alk Phos, ALT, AST, Total. Bili, TP)    2. Encounter for counseling regarding contraception  Pt would like to see OB to discuss changing her IUD.  - OB/GYN REFERRAL    Patient has been advised of split billing requirements and indicates understanding: Yes  COUNSELING:  Reviewed preventive health counseling, as reflected in patient instructions    Estimated body mass index is 25.08 kg/m  as calculated from the following:    Height as of this encounter: 1.515 m (4' 11.65\").    Weight as of this encounter: 57.6 kg (126 lb 14.4 " oz).        She reports that she has never smoked. She has never used smokeless tobacco.      Counseling Resources:  ATP IV Guidelines  Pooled Cohorts Equation Calculator  Breast Cancer Risk Calculator  BRCA-Related Cancer Risk Assessment: FHS-7 Tool  FRAX Risk Assessment  ICSI Preventive Guidelines  Dietary Guidelines for Americans, 2010  USDA's MyPlate  ASA Prophylaxis  Lung CA Screening    April J. Andrea Paulino MD  [facility]

## 2021-06-12 ASSESSMENT — ANXIETY QUESTIONNAIRES: GAD7 TOTAL SCORE: 0

## 2021-06-12 ASSESSMENT — PATIENT HEALTH QUESTIONNAIRE - PHQ9: SUM OF ALL RESPONSES TO PHQ QUESTIONS 1-9: 0

## 2021-06-14 LAB
DEPRECATED CALCIDIOL+CALCIFEROL SERPL-MC: 10 UG/L (ref 20–75)
HCV AB SERPL QL IA: NONREACTIVE

## 2021-06-18 DIAGNOSIS — E55.9 VITAMIN D DEFICIENCY: Primary | ICD-10-CM

## 2021-06-18 RX ORDER — ERGOCALCIFEROL 1.25 MG/1
50000 CAPSULE, LIQUID FILLED ORAL WEEKLY
Qty: 12 CAPSULE | Refills: 0 | Status: SHIPPED | OUTPATIENT
Start: 2021-06-18 | End: 2024-01-22

## 2021-10-03 ENCOUNTER — HEALTH MAINTENANCE LETTER (OUTPATIENT)
Age: 41
End: 2021-10-03

## 2021-11-26 ENCOUNTER — IMMUNIZATION (OUTPATIENT)
Dept: FAMILY MEDICINE | Facility: CLINIC | Age: 41
End: 2021-11-26
Payer: COMMERCIAL

## 2021-11-26 DIAGNOSIS — Z23 NEED FOR PROPHYLACTIC VACCINATION AND INOCULATION AGAINST INFLUENZA: Primary | ICD-10-CM

## 2021-11-26 PROCEDURE — 99207 PR NO CHARGE NURSE ONLY: CPT

## 2021-11-26 PROCEDURE — 90686 IIV4 VACC NO PRSV 0.5 ML IM: CPT

## 2021-11-26 PROCEDURE — 90471 IMMUNIZATION ADMIN: CPT

## 2022-01-17 ENCOUNTER — APPOINTMENT (OUTPATIENT)
Dept: INTERPRETER SERVICES | Facility: CLINIC | Age: 42
End: 2022-01-17
Payer: COMMERCIAL

## 2022-03-03 ENCOUNTER — OFFICE VISIT (OUTPATIENT)
Dept: URGENT CARE | Facility: URGENT CARE | Age: 42
End: 2022-03-03
Payer: COMMERCIAL

## 2022-03-03 VITALS
SYSTOLIC BLOOD PRESSURE: 120 MMHG | TEMPERATURE: 98.9 F | HEART RATE: 78 BPM | RESPIRATION RATE: 20 BRPM | DIASTOLIC BLOOD PRESSURE: 78 MMHG | OXYGEN SATURATION: 98 %

## 2022-03-03 DIAGNOSIS — A08.4 VIRAL GASTROENTERITIS: Primary | ICD-10-CM

## 2022-03-03 DIAGNOSIS — Z20.822 SUSPECTED 2019 NOVEL CORONAVIRUS INFECTION: ICD-10-CM

## 2022-03-03 LAB
FLUAV AG SPEC QL IA: NEGATIVE
FLUBV AG SPEC QL IA: NEGATIVE

## 2022-03-03 PROCEDURE — U0005 INFEC AGEN DETEC AMPLI PROBE: HCPCS | Performed by: PHYSICIAN ASSISTANT

## 2022-03-03 PROCEDURE — 87804 INFLUENZA ASSAY W/OPTIC: CPT | Performed by: PHYSICIAN ASSISTANT

## 2022-03-03 PROCEDURE — U0003 INFECTIOUS AGENT DETECTION BY NUCLEIC ACID (DNA OR RNA); SEVERE ACUTE RESPIRATORY SYNDROME CORONAVIRUS 2 (SARS-COV-2) (CORONAVIRUS DISEASE [COVID-19]), AMPLIFIED PROBE TECHNIQUE, MAKING USE OF HIGH THROUGHPUT TECHNOLOGIES AS DESCRIBED BY CMS-2020-01-R: HCPCS | Performed by: PHYSICIAN ASSISTANT

## 2022-03-03 PROCEDURE — 99213 OFFICE O/P EST LOW 20 MIN: CPT | Performed by: PHYSICIAN ASSISTANT

## 2022-03-03 RX ORDER — ACETAMINOPHEN 500 MG
500-1000 TABLET ORAL EVERY 6 HOURS PRN
Qty: 30 TABLET | Refills: 0 | Status: SHIPPED | OUTPATIENT
Start: 2022-03-03 | End: 2024-01-22

## 2022-03-03 NOTE — PATIENT INSTRUCTIONS
"Stay well hydrated, drink plenty of water.  Symptoms will continue to improve over the next several days.  Return for any worsening of symptoms as we discussed.      Patient Education     Viral Gastroenteritis (Adult)    Gastroenteritis is commonly called the \"stomach flu,\" although it has nothing to do with influenza. It is most often caused by a virus that affects the stomach and intestinal tract and usually lasts from 2 to 7 days. Common viruses causing gastroenteritis include norovirus, rotavirus, and hepatitis A. Non-viral causes of gastroenteritis include bacteria, parasites, and toxins.  The danger from repeated vomiting or diarrhea is dehydration. This is the loss of too much fluid from the body. When this occurs, body fluids must be replaced. Antibiotics don't help with this illness because it is usually viral. Simple home treatment will be helpful.  Symptoms of viral gastroenteritis may include:    Watery, loose stools    Stomach pain or abdominal cramps    Fever and chills    Nausea and vomiting    Loss of bowel control    Headache  Home care  Gastroenteritis is transmitted by contact with the stool or vomit of an infected person. This can occur from person to person or from contact with a contaminated surface.  Follow these guidelines when caring for yourself at home:    If symptoms are severe, rest at home for the next 24 hours or until you are feeling better.    Wash your hands with soap and water or use alcohol-based  to prevent the spread of infection. Wash your hands after touching anyone who is sick.    Wash your hands or use alcohol-based  after using the toilet and before meals. Clean the toilet after each use.  Remember these tips when preparing food:    People with diarrhea should not prepare or serve food to others. When preparing foods, wash your hands before and after.    Wash your hands after using cutting boards, countertops, knives, or utensils that have been in contact " with raw food.    Dry your hands with a single use towel.    Keep uncooked meats away from cooked and ready-to-eat foods.  Medicine  You may use acetaminophen or NSAID medicines like ibuprofen or naproxen to control fever unless another medicine was given. If you have chronic liver or kidney disease, talk with your healthcare provider before using these medicines. Also talk with your provider if you've had a stomach ulcer or gastrointestinal bleeding. Don't give aspirin to anyone under 18 years of age who is ill with a fever. It may cause severe liver damage. Don't use NSAIDS is you are already taking one for another condition (like arthritis) or are on aspirin (such as for heart disease or after a stroke).  If medicine for vomiting or diarrhea are prescribed, take these only as directed. Nausea and diarrhea medicines are generally OK unless you have bleeding, fever, or severe abdominal pain.  Diet  Follow these guidelines for food:    Water and liquids are important so you don't get dehydrated. Drink a small amount at a time or suck on ice chips if you are vomiting.    If you eat, avoid fatty, greasy, spicy, or fried foods.    Don't eat dairy if you have diarrhea. This can make diarrhea worse.    Avoid tobacco, alcohol, and caffeine which may worsen symptoms.  During the first 24 hours (the first full day), follow the diet below:    Beverages. Sports drinks, soft drinks without caffeine, ginger ale, mineral water (plain or flavored), decaffeinated tea and coffee. If you are very dehydrated, sports drinks aren't a good choice. They have too much sugar and not enough electrolytes. In this case, commercially available products called oral rehydration solutions, are best.    Soups. Eat clear broth, consommé, and bouillon.    Desserts. Eat gelatin, ice pops, and fruit juice bars.  During the next 24 hours (the second day), you may add the following to the above:    Hot cereal, plain toast, bread, rolls, and  crackers    Plain noodles, rice, mashed potatoes, chicken noodle or rice soup    Unsweetened canned fruit (avoid pineapple), bananas    Limit fat intake to less than 15 grams per day. Do this by avoiding margarine, butter, oils, mayonnaise, sauces, gravies, fried foods, peanut butter, meat, poultry, and fish.    Limit fiber and avoid raw or cooked vegetables, fresh fruits (except bananas), and bran cereals.    Limit caffeine and chocolate. Don't use spices or seasonings other than salt.    Limit dairy products.    Avoid alcohol.  During the next 24 hours:    Gradually resume a normal diet as you feel better and your symptoms improve.    If at any time it starts getting worse again, go back to clear liquids until you feel better.  Follow-up care  Follow up with your healthcare provider, or as advised. Call your provider if you don't get better within 24 hours or if diarrhea lasts more than a week. Also follow up if you are unable to keep down liquids and get dehydrated. If a stool (diarrhea) sample was taken, call as directed for the results.  Call 911  Call 911 if any of these occur:    Trouble breathing    Chest pain    Confused    Severe drowsiness or trouble awakening    Fainting or loss of consciousness    Rapid heart rate    Seizure    Stiff neck  When to seek medical advice  Call your healthcare provider right away if any of these occur:    Abdominal pain that gets worse    Continued vomiting (unable to keep liquids down)    Frequent diarrhea (more than 5 times a day)    Blood in vomit or stool (black or red color)    Dark urine, reduced urine output, or extreme thirst    Weakness or dizziness    Drowsiness    Fever of 100.4 F (38 C) or higher, or as directed by your healthcare provider    New rash  [employer] last reviewed this educational content on 6/1/2018 2000-2021 [employer]. All rights reserved. This information is not intended as a substitute for professional medical care. Always follow  your healthcare professional's instructions.

## 2022-03-03 NOTE — PROGRESS NOTES
Assessment & Plan     Viral gastroenteritis  Symptoms primarily consistent with probable viral gastroenteritis etiology, especially given the rapid spread through the family with everyone already resolving or completely resolved.  Patient no longer vomiting, as such we will not prescribe an antiemetic.  Patient does have a mild headache, recommended using acetaminophen to which she requested prescription which I have provided.  Advise follow-up with primary care if no improvement in symptoms over the course of the next week, return earlier for worsening symptoms.  - acetaminophen (TYLENOL) 500 MG tablet  Dispense: 30 tablet; Refill: 0    Suspected 2019 novel coronavirus infection  Testing for influenza and Covid obtained by nursing protocol.  Influenza negative.  Unlikely coronavirus infection etiology, results will return in the next 24 to 36 hours.  - Symptomatic; Unknown COVID-19 Virus (Coronavirus) by PCR Nose  - Influenza A/B antigen         Return in about 1 week (around 3/10/2022) for reevaluation with PCP if symptoms not improving, return earlier if symptoms are worsening.    CHERIE [facility] KEITH Greene is a 41 year old female who presents to clinic today for the following health issues:  Chief Complaint   Patient presents with     Urgent Care     fever/stomach pain and X1 day      HPI    Patient is a 41-year-old Greek speaking female presenting to urgent care with her  for evaluation.  Patient speaks some English and states that she is comfortable having her  interpret when she has gaps in her understanding of English.  Patient reports 1 to 2 days of nausea/vomiting and diarrhea, low-grade fever, and feeling rundown and fatigued.  Reports that children at home had similar symptoms previous to this earlier in the week and are already improving.  Patient also reports some abdominal cramping intermittently typically followed by diarrheal  episode.  Patient reports 2-3 loose stools per day over the past day or 2.  Reports some subjective fever, but has not taken temperature.  Notes that vomiting resolved after using a prescription for Zofran that was provided to one of her children earlier in the year.  Last episode of emesis was yesterday.  Denies lightheadedness, hematemesis, bright red blood per rectum, melena, abdominal pain, flank pain, vaginal discharge or bleeding, or other complaints.    Review of Systems  Focused ROS obtained, pertinent positives/negatives reviewed in the HPI.       Objective    /78 (BP Location: Right arm, Patient Position: Sitting, Cuff Size: Adult Regular)   Pulse 78   Temp 98.9  F (37.2  C)   Resp 20   SpO2 98%   Physical Exam   GENERAL: healthy, alert and no distress  RESP: lungs clear to auscultation - no rales, rhonchi or wheezes  CV: regular rate and rhythm and peripheral pulses strong  ABDOMEN: soft, nontender, no hepatosplenomegaly, no masses and bowel sounds slightly hyperactive  SKIN: no suspicious lesions or rashes

## 2022-03-04 LAB — SARS-COV-2 RNA RESP QL NAA+PROBE: NEGATIVE

## 2022-03-30 NOTE — PROGRESS NOTES
37w6d Feeling good, no c/o.  mvmt a little slower, but still moving all throughout the day.  GBS pos.  RTC weekly  richardp  
(1) Female

## 2022-08-11 ENCOUNTER — OFFICE VISIT (OUTPATIENT)
Dept: MIDWIFE SERVICES | Facility: CLINIC | Age: 42
End: 2022-08-11
Payer: COMMERCIAL

## 2022-08-11 VITALS — WEIGHT: 117 LBS | DIASTOLIC BLOOD PRESSURE: 60 MMHG | BODY MASS INDEX: 23.12 KG/M2 | SYSTOLIC BLOOD PRESSURE: 100 MMHG

## 2022-08-11 DIAGNOSIS — T83.32XA INTRAUTERINE CONTRACEPTIVE DEVICE THREADS LOST, INITIAL ENCOUNTER: Primary | ICD-10-CM

## 2022-08-11 PROCEDURE — 99212 OFFICE O/P EST SF 10 MIN: CPT | Performed by: ADVANCED PRACTICE MIDWIFE

## 2022-08-11 NOTE — NURSING NOTE
"Chief Complaint   Patient presents with     IUD     Check, no concerns       Initial /60 (BP Location: Right arm, Cuff Size: Adult Regular)   Wt 53.1 kg (117 lb)   BMI 23.12 kg/m   Estimated body mass index is 23.12 kg/m  as calculated from the following:    Height as of 21: 1.515 m (4' 11.65\").    Weight as of this encounter: 53.1 kg (117 lb).  BP completed using cuff size: regular    Questioned patient about current smoking habits.  Pt. has never smoked.          "

## 2022-08-11 NOTE — PROGRESS NOTES
S:  Here with  and 2 small kids.  Here for an IUD check.  She has not checked the strings.  She has not had bleeding with the IUD and continues not to bleed.  It was placed in 2019.  O:  Appears well, no distress  Pelvic Exam:  Vulva: No external lesions, normal hair distribution, no adenopathy  Vagina: Moist, pink, no abnormal discharge, well rugated, no lesions  Cervix: smooth, pink, no visible lesions - IUD string not visualized   Uterus: Possibly enlarged, anteverted, non-tender, mobile  Ovaries: No mass, non-tender, mobile  A/P  (T83.32XA) Intrauterine contraceptive device threads lost, initial encounter  (primary encounter diagnosis)  Comment:  When we can't find the string we can't be sure that it is in its place.  Please use back up birth control until it is confirmed in its place.  They agree to an US.    Plan: US Pelvic Complete with Transvaginal

## 2022-08-12 ENCOUNTER — NURSE TRIAGE (OUTPATIENT)
Dept: NURSING | Facility: CLINIC | Age: 42
End: 2022-08-12

## 2022-08-12 ENCOUNTER — ANCILLARY PROCEDURE (OUTPATIENT)
Dept: ULTRASOUND IMAGING | Facility: CLINIC | Age: 42
End: 2022-08-12
Attending: ADVANCED PRACTICE MIDWIFE
Payer: COMMERCIAL

## 2022-08-12 DIAGNOSIS — T83.32XA INTRAUTERINE CONTRACEPTIVE DEVICE THREADS LOST, INITIAL ENCOUNTER: ICD-10-CM

## 2022-08-12 PROCEDURE — 76830 TRANSVAGINAL US NON-OB: CPT | Performed by: OBSTETRICS & GYNECOLOGY

## 2022-08-12 PROCEDURE — 76856 US EXAM PELVIC COMPLETE: CPT | Performed by: OBSTETRICS & GYNECOLOGY

## 2022-08-12 NOTE — TELEPHONE ENCOUNTER
Triage Call:    Patient's spouse calling for clarification of patient's Ultrasound results.  Verbal consent to communicate given by patient.  Answered questions for spouse in regards to the My Chart results.  No additional questions or concerns.    Elana Villalobos RN  08/12/22 6:28 PM  [facility] Nurse Advisor    Reason for Disposition    Health Information question, no triage required and triager able to answer question    Additional Information    Negative: [1] Caller is not with the adult (patient) AND [2] reporting urgent symptoms    Negative: Lab result questions    Negative: Medication questions    Negative: Caller can't be reached by phone    Negative: Caller has already spoken to PCP or another triager    Negative: RN needs further essential information from caller in order to complete triage    Negative: Requesting regular office appointment    Negative: [1] Caller requesting NON-URGENT health information AND [2] PCP's office is the best resource    Protocols used: INFORMATION ONLY CALL - NO TRIAGE-A-

## 2022-09-11 ENCOUNTER — HEALTH MAINTENANCE LETTER (OUTPATIENT)
Age: 42
End: 2022-09-11

## 2022-10-07 ENCOUNTER — IMMUNIZATION (OUTPATIENT)
Dept: FAMILY MEDICINE | Facility: CLINIC | Age: 42
End: 2022-10-07
Payer: COMMERCIAL

## 2022-10-07 DIAGNOSIS — Z23 NEED FOR PROPHYLACTIC VACCINATION AND INOCULATION AGAINST INFLUENZA: Primary | ICD-10-CM

## 2022-10-07 PROCEDURE — 90686 IIV4 VACC NO PRSV 0.5 ML IM: CPT

## 2022-10-07 PROCEDURE — 99207 PR NO CHARGE NURSE ONLY: CPT

## 2022-10-07 PROCEDURE — 90471 IMMUNIZATION ADMIN: CPT

## 2023-01-23 NOTE — ADDENDUM NOTE
Addended by: VONNIE LAMB on: 7/17/2017 03:56 PM     Modules accepted: Orders     Thank you for allowing me to care for your child. Please follow the below instructions.     Schedule appointment with your pediatrician to have 2 staples removed in 7-10 days  Apply bacitracin once daily until staples are removed  Do not soak the wound (eg, swimming or bathing)    Return to the emergency room with fevers, drainage from wound, staples come out, new or concerning symptoms

## 2023-02-27 NOTE — ADDENDUM NOTE
Addended by: DANIELLE FIGUEREDO on: 3/15/2019 03:11 PM     Modules accepted: Orders, SmartSet     PROVIDER:[TOKEN:[[id number]]]

## 2023-07-28 NOTE — MR AVS SNAPSHOT
After Visit Summary   2018    Ramona Hill    MRN: [id number]           Patient Information     Date Of Birth          1980        Visit Information        Provider Department      2018 11:30 AM Sofía Aguilar MD [facility]        Today's Diagnoses     Supervision of high-risk pregnancy of elderly multigravida    -  1    Tachycardia        Previous  section complicating pregnancy        Group B Streptococcus urinary tract infection affecting pregnancy in first trimester           Follow-ups after your visit        Your next 10 appointments already scheduled     Dec 17, 2018  3:00 PM CST   ESTABLISHED PRENATAL with Sofía Aguilar MD   [facility] ([facility])    [address] [place] [id number] [phone]              Who to contact     If you have questions or need follow up information about today's clinic visit or your schedule please contact [facility] directly at [phone].  Normal or non-critical lab and imaging results will be communicated to you by MyChart, letter or phone within 4 business days after the clinic has received the results. If you do not hear from us within 7 days, please contact the clinic through [employer] or phone. If you have a critical or abnormal lab result, we will notify you by phone as soon as possible.  Submit refill requests through [employer] or call your pharmacy and they will forward the refill request to us. Please allow 3 business days for your refill to be completed.          Additional Information About Your Visit        MyChart Information     [employer] gives you secure access to your electronic health record. If you see a primary care provider, you can also send messages to your care team and make appointments. If you have questions, please call your primary care clinic.  If you do not have a primary care provider, please call  "[phone] and they will assist you.        Care EveryWhere ID     This is your Care EveryWhere ID. This could be used by other organizations to access your [facility] medical records  [id number]        Your Vitals Were     Pulse Height Last Period Pulse Oximetry BMI (Body Mass Index)       99 4' 11.75\" (1.518 m) 08/20/2018 (Approximate) 99% 22.81 kg/m2        Blood Pressure from Last 3 Encounters:   11/21/18 99/69   11/12/18 108/61   09/24/18 113/69    Weight from Last 3 Encounters:   11/21/18 115 lb 12.8 oz (52.5 kg)   11/12/18 114 lb 11.2 oz (52 kg)   09/24/18 115 lb (52.2 kg)              We Performed the Following     TSH with free T4 reflex          Today's Medication Changes          These changes are accurate as of 11/21/18  2:13 PM.  If you have any questions, ask your nurse or doctor.               These medicines have changed or have updated prescriptions.        Dose/Directions    prenatal multivitamin plus iron 27-0.8 MG Tabs per tablet   This may have changed:  Another medication with the same name was removed. Continue taking this medication, and follow the directions you see here.   Used for:  AMA (advanced maternal age) multigravida 35+   Changed by:  Sofía Aguilar MD        Dose:  1 tablet   Take 1 tablet by mouth daily   Quantity:  100 tablet   Refills:  3            Where to get your medicines      These medications were sent to [facility] [address] [facility] [address] [facility] AT [facility] [address] & [facility]  [address] [facility] [address]     Phone:  [phone]     vitamin D3 2000 units tablet                Primary Care Provider Office Phone # Fax #    [facility] [phone]       [address] [place] [address]        Equal Access to Services     CASTRO PALMA AH: Sabiha avalos Sokylie, waaxda luqadaha, qaybta kaalmada adeegyada, laura lockett. So wa " [phone].    ATENCIÓN: Si favio pearson, tiene a soto disposición servicios gratuitos de asistencia lingüística. Peter butcher [phone].    We comply with applicable federal civil rights laws and [place] laws. We do not discriminate on the basis of race, color, national origin, age, disability, sex, sexual orientation, or gender identity.            Thank you!     Thank you for choosing [facility]  for your care. Our goal is always to provide you with excellent care. Hearing back from our patients is one way we can continue to improve our services. Please take a few minutes to complete the written survey that you may receive in the mail after your visit with us. Thank you!             Your Updated Medication List - Protect others around you: Learn how to safely use, store and throw away your medicines at www.disposemymeds.org.          This list is accurate as of 11/21/18  2:13 PM.  Always use your most recent med list.                   Brand Name Dispense Instructions for use Diagnosis    ampicillin 500 MG capsule    PRINCIPEN    21 capsule    Take 1 capsule (500 mg) by mouth 3 times daily for 7 days    Group B Streptococcus urinary tract infection affecting pregnancy in first trimester       doxylamine 25 MG Tabs tablet    UNISOM    30 tablet    Take 0.5 tablets (12.5 mg) by mouth 4 times daily as needed (nausea)    Hyperemesis gravidarum       prenatal multivitamin plus iron 27-0.8 MG Tabs per tablet     100 tablet    Take 1 tablet by mouth daily    AMA (advanced maternal age) multigravida 35+       pyridOXINE 25 MG tablet    vitamin B-6    50 tablet    Take 1 tablet (25 mg) by mouth 2 times daily as needed (nausea)    Hyperemesis gravidarum       vitamin D3 2000 units tablet    CHOLECALCIFEROL    100 tablet    Take 1 tablet by mouth daily    Supervision of high-risk pregnancy of elderly multigravida          Epidermal Closure Graft Donor Site (Optional): simple interrupted

## 2023-10-07 ENCOUNTER — HEALTH MAINTENANCE LETTER (OUTPATIENT)
Age: 43
End: 2023-10-07

## 2023-10-20 ENCOUNTER — IMMUNIZATION (OUTPATIENT)
Dept: FAMILY MEDICINE | Facility: CLINIC | Age: 43
End: 2023-10-20
Payer: COMMERCIAL

## 2023-10-20 DIAGNOSIS — Z23 NEED FOR PROPHYLACTIC VACCINATION AND INOCULATION AGAINST INFLUENZA: Primary | ICD-10-CM

## 2023-10-20 PROCEDURE — 90686 IIV4 VACC NO PRSV 0.5 ML IM: CPT

## 2023-10-20 PROCEDURE — 99207 PR NO CHARGE NURSE ONLY: CPT

## 2023-10-20 PROCEDURE — 90471 IMMUNIZATION ADMIN: CPT

## 2023-11-02 ENCOUNTER — NURSE TRIAGE (OUTPATIENT)
Dept: INTERNAL MEDICINE | Facility: CLINIC | Age: 43
End: 2023-11-02
Payer: COMMERCIAL

## 2023-11-02 NOTE — TELEPHONE ENCOUNTER
Spouse calls back as original phone call was disconnected. Patient had small bowel movement yesterday, but it was hard or dry. Patient had last normal bowel movement over a week ago. Stool did not have blood or black appearance. Patient has had constipation issues in the past. Patient has tried suppository - Dulcolax yesterday (only once in the last week) but patient only able to go a small amount. Spouse denies bloating, hard abdomen or abdominal pain. Denies nausea/vomiting. Patient is eating well.     Advised to patient to try Miralax daily with water as well as increasing fiber increase and increase physical exercise. Advised to patient to report any abdominal pain, distension, vomiting/nausea or decrease in appetite.     Attempted to encourage appointment with provider, but spouse declines at this time wanting to try at home measures first.     Thank you,  Randy Lindsay, Triage RN [facility]  11:18 AM 11/2/2023

## 2023-11-02 NOTE — TELEPHONE ENCOUNTER
"S-(situation): Constipation    B-(background): Normally has a normal bowel movement 2x/week    A-(assessment): Spouse states that patient is having constipation for more than a week now. Tried suppository laxative- small & dry results. Has been straining without results. Denies blood on the stool, abdominal pain, fever, medications that causes constipation, rectal hemorrhoids, fissures, surgery, dietary changes. Does states she doesn't drink enough fluid on a daily basis. Very active at home but does not exercise on a daily basis.     R-(recommendations): Per protocol, should be seen today but call but lost prior to let the patient know about this. Called the patient and advised to call the clinic back.     Reason for Disposition   Last bowel movement (BM) > 4 days ago    Additional Information   Negative: Abdomen pain is main symptom and male   Negative: Abdomen pain is main symptom and female   Negative: Rectal bleeding or blood in stool is main symptom   Negative: Vomiting bile (green color)   Negative: Patient sounds very sick or weak to the triager   Negative: Constant abdominal pain lasting > 2 hours   Negative: Vomiting and abdomen looks much more swollen than usual    Answer Assessment - Initial Assessment Questions  1. STOOL PATTERN OR FREQUENCY: \"How often do you have a bowel movement (BM)?\"  (Normal range: 3 times a day to every 3 days)  \"When was your last BM?\"        10/26/23  2. STRAINING: \"Do you have to strain to have a BM?\"       yes  3. RECTAL PAIN: \"Does your rectum hurt when the stool comes out?\" If Yes, ask: \"Do you have hemorrhoids? How bad is the pain?\"  (Scale 1-10; or mild, moderate, severe)      Says it's \"painful\" when the stool comes out.  4. STOOL COMPOSITION: \"Are the stools hard?\"       Small and dry.  5. BLOOD ON STOOLS: \"Has there been any blood on the toilet tissue or on the surface of the BM?\" If Yes, ask: \"When was the last time?\"      No.   6. CHRONIC CONSTIPATION: \"Is this a new " "problem for you?\"  If No, ask: \"How long have you had this problem?\" (days, weeks, months)       Yes.  7. CHANGES IN DIET OR HYDRATION: \"Have there been any recent changes in your diet?\" \"How much fluids are you drinking on a daily basis?\"  \"How much have you had to drink today?\"      No. \"Doesn't take a lot of water.\" Probably drinks a glass of water a day.  8. MEDICINES: \"Have you been taking any new medicines?\" \"Are you taking any narcotic pain medicines?\" (e.g., Dilaudid, morphine, Percocet, Vicodin)      No.   9. LAXATIVES: \"Have you been using any stool softeners, laxatives, or enemas?\"  If Yes, ask \"What, how often, and when was the last time?\"      Suppository.  10. ACTIVITY:  \"How much walking do you do every day?\"  \"Has your activity level decreased in the past week?\"         No.  11. CAUSE: \"What do you think is causing the constipation?\"         Unsure.  12. OTHER SYMPTOMS: \"Do you have any other symptoms?\" (e.g., abdomen pain, bloating, fever, vomiting)        No  13. MEDICAL HISTORY: \"Do you have a history of hemorrhoids, rectal fissures, or rectal surgery or rectal abscess?\"          No.   14. PREGNANCY: \"Is there any chance you are pregnant?\" \"When was your last menstrual period?\"        No.    Protocols used: Constipation-A-OH    "

## 2024-01-16 ENCOUNTER — PATIENT OUTREACH (OUTPATIENT)
Dept: CARE COORDINATION | Facility: CLINIC | Age: 44
End: 2024-01-16
Payer: COMMERCIAL

## 2024-01-16 NOTE — PROGRESS NOTES
Clinic Care Coordination Contact  Program:  County:  [place]   Renewal: [employer]   Date Applied:     ROBY Outreach:   1/16/24: CTA called to see if patient needed assistance with their [employer] Renewal. Patient declined needing assistance and no follow up needed   Tamela Kumar  Care   [facility]  Clinic Care Coordination  [phone]      Health Insurance:      Referral/Screening:

## 2024-01-22 ENCOUNTER — OFFICE VISIT (OUTPATIENT)
Dept: FAMILY MEDICINE | Facility: CLINIC | Age: 44
End: 2024-01-22
Payer: COMMERCIAL

## 2024-01-22 VITALS
TEMPERATURE: 97.9 F | DIASTOLIC BLOOD PRESSURE: 68 MMHG | BODY MASS INDEX: 23.16 KG/M2 | WEIGHT: 118 LBS | SYSTOLIC BLOOD PRESSURE: 106 MMHG | OXYGEN SATURATION: 99 % | RESPIRATION RATE: 17 BRPM | HEIGHT: 60 IN | HEART RATE: 80 BPM

## 2024-01-22 DIAGNOSIS — Z00.00 ROUTINE GENERAL MEDICAL EXAMINATION AT A HEALTH CARE FACILITY: Primary | ICD-10-CM

## 2024-01-22 DIAGNOSIS — Z12.31 ENCOUNTER FOR SCREENING MAMMOGRAM FOR BREAST CANCER: ICD-10-CM

## 2024-01-22 DIAGNOSIS — Z13.220 LIPID SCREENING: ICD-10-CM

## 2024-01-22 DIAGNOSIS — E55.9 VITAMIN D DEFICIENCY: ICD-10-CM

## 2024-01-22 DIAGNOSIS — Z13.1 SCREENING FOR DIABETES MELLITUS: ICD-10-CM

## 2024-01-22 PROBLEM — Z97.5 IUD (INTRAUTERINE DEVICE) IN PLACE: Status: ACTIVE | Noted: 2024-01-22

## 2024-01-22 PROBLEM — E04.9 ENLARGED THYROID: Status: RESOLVED | Noted: 2018-12-20 | Resolved: 2024-01-22

## 2024-01-22 PROBLEM — Z98.891 S/P CESAREAN SECTION: Status: RESOLVED | Noted: 2019-05-20 | Resolved: 2024-01-22

## 2024-01-22 PROBLEM — Z23 NEED FOR TDAP VACCINATION: Status: RESOLVED | Noted: 2018-11-12 | Resolved: 2024-01-22

## 2024-01-22 PROCEDURE — 90471 IMMUNIZATION ADMIN: CPT | Performed by: PHYSICIAN ASSISTANT

## 2024-01-22 PROCEDURE — 99396 PREV VISIT EST AGE 40-64: CPT | Mod: 25 | Performed by: PHYSICIAN ASSISTANT

## 2024-01-22 PROCEDURE — 82947 ASSAY GLUCOSE BLOOD QUANT: CPT | Performed by: PHYSICIAN ASSISTANT

## 2024-01-22 PROCEDURE — 90746 HEPB VACCINE 3 DOSE ADULT IM: CPT | Performed by: PHYSICIAN ASSISTANT

## 2024-01-22 PROCEDURE — 80061 LIPID PANEL: CPT | Performed by: PHYSICIAN ASSISTANT

## 2024-01-22 PROCEDURE — 82306 VITAMIN D 25 HYDROXY: CPT | Performed by: PHYSICIAN ASSISTANT

## 2024-01-22 PROCEDURE — 36415 COLL VENOUS BLD VENIPUNCTURE: CPT | Performed by: PHYSICIAN ASSISTANT

## 2024-01-22 ASSESSMENT — PAIN SCALES - GENERAL: PAINLEVEL: NO PAIN (0)

## 2024-01-22 NOTE — PROGRESS NOTES
Preventive Care Visit  [facility] JAMAICAMOCHUCHO Meadows PA-C, Family Medicine  2024       SUBJECTIVE:   Ramona is a 43 year old, presenting for the following:  Physical        2024     1:49 PM   Additional Questions   Roomed by Jodie TRACEY     Healthy Habits:     Getting at least 3 servings of Calcium per day:  NO (but takes multivitamin with calcium)    Bi-annual eye exam:  Yes    Dental care twice a year:  Yes    Sleep apnea or symptoms of sleep apnea:  None    Diet:  Regular (no restrictions)    Frequency of exercise:  None    Duration of exercise:  N/A    Taking medications regularly:  No    Barriers to taking medications:  None    Medication side effects:  Not applicable    Additional concerns today:  No    History of vitamin D deficiency  Takes multivitamin with vitamin D  Would like to check vitamin D level today    Not fasting  Would like lipids checked today    IUD in place  Doing well  No periods with IUD  Wants to wait on pap today      Social History     Tobacco Use    Smoking status: Never    Smokeless tobacco: Never   Substance Use Topics    Alcohol use: No     Alcohol/week: 0.0 standard drinks of alcohol             2021     2:50 PM   Alcohol Use   Prescreen: >3 drinks/day or >7 drinks/week? No     Reviewed orders with patient.  Reviewed health maintenance and updated orders accordingly - Yes  Lab work is in process  Labs reviewed in EPIC  BP Readings from Last 3 Encounters:   22 100/60   22 120/78   21 108/54    Wt Readings from Last 3 Encounters:   22 53.1 kg (117 lb)   21 57.6 kg (126 lb 14.4 oz)   19 56.7 kg (125 lb)                  Patient Active Problem List   Diagnosis    Language barrier    Need for Tdap vaccination    Enlarged thyroid    S/P  section     Past Surgical History:   Procedure Laterality Date     SECTION N/A 2016    Procedure:  SECTION;  Surgeon: Tammie Colon MD;  Location:   L+D     SECTION N/A 2017    Procedure:  SECTION;  Repeat  Section ;  Surgeon: Zo Fox MD;  Location: [facility]     SECTION N/A 2019    Procedure:  SECTION;  Surgeon: Sofía Aguilar MD;  Location: [facility]       Social History     Tobacco Use    Smoking status: Never    Smokeless tobacco: Never   Substance Use Topics    Alcohol use: No     Alcohol/week: 0.0 standard drinks of alcohol     Family History   Problem Relation Age of Onset    Esophagitis Mother     Ovarian Cancer No family hx of     Breast Cancer No family hx of     Colon Cancer No family hx of          Current Outpatient Medications   Medication Sig Dispense Refill    levonorgestrel (MIRENA) 20 MCG/24HR IUD 1 each by Intrauterine route once Due for removal: 2025       No Known Allergies  Recent Labs   Lab Test 21  1538 03/15/19  1421 18  1253 18  1626   ALT 21  --   --   --    CR 0.61  --   --  0.67   GFRESTIMATED >90  --   --  >90   GFRESTBLACK >90  --   --  >90   POTASSIUM 3.5  --   --  3.9   TSH  --  1.80 3.79 1.88        Breast Cancer Screenin/11/2021     2:53 PM   Breast CA Risk Assessment (FHS-7)   Do you have a family history of breast, colon, or ovarian cancer? No / Unknown       Mammogram Screening - Offered annual screening and updated Health Maintenance for mutual plan based on risk factor consideration  Pertinent mammograms are reviewed under the imaging tab.    History of abnormal Pap smear:       Latest Ref Rng & Units 7/10/2019    10:01 AM 7/10/2019    10:00 AM 2016    12:00 AM   PAP / HPV   PAP (Historical)  NIL   NIL    HPV 16 DNA NEG^Negative  Negative     HPV 18 DNA NEG^Negative  Negative     Other HR HPV NEG^Negative  Negative       Reviewed and updated as needed this visit by clinical staff   Tobacco  Allergies  Meds  Problems  Med Hx  Surg Hx  Fam Hx          Reviewed and updated as needed this visit by Provider   Tobacco   "Allergies  Meds  Problems  Med Hx  Surg Hx  Fam Hx          Past Medical History:   Diagnosis Date    NO ACTIVE PROBLEMS     Postpartum anxiety 2016    Status post  2017      Past Surgical History:   Procedure Laterality Date     SECTION N/A 2016    Procedure:  SECTION;  Surgeon: Tammie Colon MD;  [facility]: [facility]     SECTION N/A 2017    Procedure:  SECTION;  Repeat  Section ;  Surgeon: Zo Fox MD;  [facility]: [facility]     SECTION N/A 2019    Procedure:  SECTION;  Surgeon: Sofía Aguilar MD;  [facility]     OB History    Para Term  AB Living   3 3 3 0 0 3   SAB IAB Ectopic Multiple Live Births   0 0 0 0 3      # Outcome Date GA Lbr Christos/2nd Weight Sex Delivery Anes PTL Lv   3 Term 19 39w0d  3.657 kg (8 lb 1 oz) F CS-LTranv   ROMELIA      Name: LYLAFEMALE-JOVAN      Apgar1: 9  Apgar5: 9   2 Term 17 39w2d  2.8 kg (6 lb 2.8 oz) F CS-LTranv Spinal  ROMELIA      Name: LYLABABY1 JOVAN      Apgar1: 9  Apgar5: 9   1 Term 16 40w2d  3.204 kg (7 lb 1 oz) F CS-LTranv Gen  ROMELIA      Apgar1: 8  Apgar5: 9     Review of Systems    Review of Systems  Constitutional, HEENT, cardiovascular, pulmonary, GI, , musculoskeletal, neuro, skin, endocrine and psych systems are negative, except as otherwise noted.    OBJECTIVE:   There were no vitals taken for this visit.   Estimated body mass index is 23.12 kg/m  as calculated from the following:    Height as of 21: 1.515 m (4' 11.65\").    Weight as of 22: 53.1 kg (117 lb).  Physical Exam  GENERAL: alert and no distress  EYES: Eyes grossly normal to inspection, PERRL and conjunctivae and sclerae normal  HENT: ear canals and TM's normal, nose and mouth without ulcers or lesions  NECK: no adenopathy, no asymmetry, masses, or scars  RESP: lungs clear to auscultation - no rales, rhonchi or wheezes  CV: regular rate and rhythm, normal S1 S2, " no S3 or S4, no murmur, click or rub, no peripheral edema  ABDOMEN: soft, nontender, no hepatosplenomegaly, no masses and bowel sounds normal  MS: no gross musculoskeletal defects noted, no edema  NEURO: Normal strength and tone, mentation intact and speech normal  PSYCH: mentation appears normal, affect normal/bright  LYMPH: no cervical, supraclavicular adenopathy    Diagnostic Test Results:  Labs reviewed in Epic    ASSESSMENT/PLAN:   Routine general medical examination at a health care facility  Reviewed personal and family history. Reviewed age appropriate screenings. Reviewed healthy BP and BMI ranges. Counseled on lifestyle modifications for optimal mental and physical health.  Discussed age-appropriate health maintenance. Recommended any needed vaccinations. Continue to focus on well balanced diet and exercise. Declines COVID vaccine.    Vitamin D deficiency  Checking vitamin D level today    Lipid screening  Not fasting today  Checking lipids    Screening for diabetes mellitus  Not fasting today  Checking glucose    Encounter for screening mammogram for breast cancer  - *MA Screening Digital Bilateral; Future       Counseling  Reviewed preventive health counseling, as reflected in patient instructions        She reports that she has never smoked. She has never used smokeless tobacco.          Signed Electronically by: Mai Meadows PA-C

## 2024-01-23 LAB
CHOLEST SERPL-MCNC: 153 MG/DL
FASTING STATUS PATIENT QL REPORTED: NORMAL
FASTING STATUS PATIENT QL REPORTED: NORMAL
GLUCOSE SERPL-MCNC: 91 MG/DL (ref 70–99)
HDLC SERPL-MCNC: 53 MG/DL
LDLC SERPL CALC-MCNC: 87 MG/DL
NONHDLC SERPL-MCNC: 100 MG/DL
TRIGL SERPL-MCNC: 67 MG/DL
VIT D+METAB SERPL-MCNC: 29 NG/ML (ref 20–50)

## 2024-02-24 ENCOUNTER — HEALTH MAINTENANCE LETTER (OUTPATIENT)
Age: 44
End: 2024-02-24

## 2024-05-07 ENCOUNTER — NURSE TRIAGE (OUTPATIENT)
Dept: NURSING | Facility: CLINIC | Age: 44
End: 2024-05-07
Payer: COMMERCIAL

## 2024-05-07 NOTE — TELEPHONE ENCOUNTER
Patient's  calling. No consent to communicate in the chart. Patient is with him and gives consent to speak with him.     Patient's left eye is itching and uncomfortable. No drainage. Slightly red. No swelling.     Patient put on mascara today, and believes she may have gotten some into her eye.     Care advice given for home care. Patient was coached on how to irrigate her eye.  states he will get some Visine for her for comfort.     Maria Eugenia Uribe RN  [facility]  May 7, 2024, 1:50 AM    Reason for Disposition   Chemical got in the eye   Harmless chemical (see list in Background) in the eye    Additional Information   Negative: Acid or alkali was the chemical  (Exceptions: mild household bleach or ammonia)   Negative: [1] Unknown chemical AND [2] any eye symptoms (e.g., pain)   Negative: [1] Harmful or possibly harmful chemical AND [2] unable to carry out flushing (irrigation) at home or work   Negative: Shortness of breath   Negative: Cloudy spot or sore on the cornea (clear central part of eye)   Negative: [1] Blurred vision AND [2] persists > 1 hour since flushing (regardless of duration of flushing [irrigation])   Negative: [1] Eye pain AND [2] persists > 1 hour since flushing (regardless of duration of flushing [irrigation])   Negative: [1] Continued tearing or blinking AND [2] persists > 1 hour since flushing (regardless of duration of flushing [irrigation])   Negative: Household bleach or ammonia   Negative: Laundry or  detergent POD   Negative: [1] Known chemical AND [2] not on the HARMLESS list   Negative: [1] Unknown chemical AND [2] NO eye symptoms (e.g., pain)   Negative: Redness persists > 24 hours    Protocols used: Eye - Red Without Pus-A-AH, Eye - Chemical In-A-AH

## 2024-06-16 ENCOUNTER — NURSE TRIAGE (OUTPATIENT)
Dept: NURSING | Facility: CLINIC | Age: 44
End: 2024-06-16
Payer: COMMERCIAL

## 2024-06-16 NOTE — TELEPHONE ENCOUNTER
"Nurse Triage SBAR    Consent: Obtained verbally from patient    Situation: Spouse calling for wife with blood in her stools.    Background: Reports wife has two stool yesterday that were bloody on the surface of the stool and bleeding that turned the toilet water red.  The blood was not mixed into the stool. Reports she may have hemorrhoids that could be the cause of the bleeding.    Assessment: rectal bleeding X 2     Protocol Recommended Disposition: Go to ED now    Recommendation: Advised patient to go to ED now. Reviewed concerning symptoms and when to call back. Spouse reports he will let wife know of my recommendation but is not sure if she will be going in because she is not experiencing any pain.    2LT or FYI: No    Follow-Up: None    ADS: No     Cydney Larsen RN [facility] 6/16/2024 11:33 AM    Reason for Disposition   Rectal bleeding, bloody stools, or blood in stool (bowel movement)   [1] MODERATE rectal bleeding (small blood clots, passing blood without stool, or toilet water turns red) AND [2] more than once a day    Additional Information   Negative: Shock suspected (e.g., cold/pale/clammy skin, too weak to stand, low BP, rapid pulse)   Negative: Difficult to awaken or acting confused (e.g., disoriented, slurred speech)   Negative: Passed out (i.e., lost consciousness, collapsed and was not responding)   Negative: [1] Vomiting AND [2] contains red blood or black (\"coffee ground\") material  (Exception: Few red streaks in vomit that only happened once.)   Negative: SEVERE rectal bleeding (large blood clots; constant or on and off bleeding)   Negative: SEVERE dizziness (e.g., unable to stand, requires support to walk, feels like passing out now)    Protocols used: Stools - Unusual Color-A-AH, Rectal Bleeding-A-AH    "

## 2024-07-14 ENCOUNTER — HOSPITAL ENCOUNTER (EMERGENCY)
Facility: CLINIC | Age: 44
Discharge: HOME | End: 2024-07-14
Attending: EMERGENCY MEDICINE | Admitting: EMERGENCY MEDICINE
Payer: COMMERCIAL

## 2024-07-14 VITALS
RESPIRATION RATE: 16 BRPM | SYSTOLIC BLOOD PRESSURE: 115 MMHG | HEART RATE: 87 BPM | OXYGEN SATURATION: 100 % | DIASTOLIC BLOOD PRESSURE: 55 MMHG | TEMPERATURE: 98 F | WEIGHT: 121.25 LBS | BODY MASS INDEX: 23.96 KG/M2

## 2024-07-14 DIAGNOSIS — K62.5 RECTAL BLEEDING: ICD-10-CM

## 2024-07-14 DIAGNOSIS — K60.2 ANAL FISSURE: ICD-10-CM

## 2024-07-14 LAB
ANION GAP SERPL CALCULATED.3IONS-SCNC: 12 MMOL/L (ref 7–15)
BASOPHILS # BLD AUTO: 0 10E3/UL (ref 0–0.2)
BASOPHILS NFR BLD AUTO: 1 %
BUN SERPL-MCNC: 5.9 MG/DL (ref 6–20)
CALCIUM SERPL-MCNC: 9.1 MG/DL (ref 8.6–10)
CHLORIDE SERPL-SCNC: 103 MMOL/L (ref 98–107)
CREAT SERPL-MCNC: 0.61 MG/DL (ref 0.51–0.95)
DEPRECATED HCO3 PLAS-SCNC: 22 MMOL/L (ref 22–29)
EGFRCR SERPLBLD CKD-EPI 2021: >90 ML/MIN/1.73M2
EOSINOPHIL # BLD AUTO: 0.1 10E3/UL (ref 0–0.7)
EOSINOPHIL NFR BLD AUTO: 1 %
ERYTHROCYTE [DISTWIDTH] IN BLOOD BY AUTOMATED COUNT: 12.1 % (ref 10–15)
GLUCOSE SERPL-MCNC: 97 MG/DL (ref 70–99)
HCT VFR BLD AUTO: 34.4 % (ref 35–47)
HGB BLD-MCNC: 11.4 G/DL (ref 11.7–15.7)
HOLD SPECIMEN: NORMAL
HOLD SPECIMEN: NORMAL
IMM GRANULOCYTES # BLD: 0 10E3/UL
IMM GRANULOCYTES NFR BLD: 0 %
LYMPHOCYTES # BLD AUTO: 1.9 10E3/UL (ref 0.8–5.3)
LYMPHOCYTES NFR BLD AUTO: 24 %
MCH RBC QN AUTO: 30.7 PG (ref 26.5–33)
MCHC RBC AUTO-ENTMCNC: 33.1 G/DL (ref 31.5–36.5)
MCV RBC AUTO: 93 FL (ref 78–100)
MONOCYTES # BLD AUTO: 0.5 10E3/UL (ref 0–1.3)
MONOCYTES NFR BLD AUTO: 6 %
NEUTROPHILS # BLD AUTO: 5.6 10E3/UL (ref 1.6–8.3)
NEUTROPHILS NFR BLD AUTO: 69 %
NRBC # BLD AUTO: 0 10E3/UL
NRBC BLD AUTO-RTO: 0 /100
PLATELET # BLD AUTO: 180 10E3/UL (ref 150–450)
POTASSIUM SERPL-SCNC: 3.7 MMOL/L (ref 3.4–5.3)
RBC # BLD AUTO: 3.71 10E6/UL (ref 3.8–5.2)
SODIUM SERPL-SCNC: 137 MMOL/L (ref 135–145)
WBC # BLD AUTO: 8.1 10E3/UL (ref 4–11)

## 2024-07-14 PROCEDURE — 85004 AUTOMATED DIFF WBC COUNT: CPT | Performed by: EMERGENCY MEDICINE

## 2024-07-14 PROCEDURE — 99283 EMERGENCY DEPT VISIT LOW MDM: CPT

## 2024-07-14 PROCEDURE — 80048 BASIC METABOLIC PNL TOTAL CA: CPT | Performed by: EMERGENCY MEDICINE

## 2024-07-14 PROCEDURE — 85025 COMPLETE CBC W/AUTO DIFF WBC: CPT | Performed by: EMERGENCY MEDICINE

## 2024-07-14 PROCEDURE — 36415 COLL VENOUS BLD VENIPUNCTURE: CPT | Performed by: EMERGENCY MEDICINE

## 2024-07-14 PROCEDURE — 46600 DIAGNOSTIC ANOSCOPY SPX: CPT

## 2024-07-14 RX ORDER — HYDROCORTISONE 25 MG/G
CREAM TOPICAL
Qty: 30 G | Refills: 0 | Status: SHIPPED | OUTPATIENT
Start: 2024-07-14

## 2024-07-14 ASSESSMENT — COLUMBIA-SUICIDE SEVERITY RATING SCALE - C-SSRS
2. HAVE YOU ACTUALLY HAD ANY THOUGHTS OF KILLING YOURSELF IN THE PAST MONTH?: NO
1. IN THE PAST MONTH, HAVE YOU WISHED YOU WERE DEAD OR WISHED YOU COULD GO TO SLEEP AND NOT WAKE UP?: NO
6. HAVE YOU EVER DONE ANYTHING, STARTED TO DO ANYTHING, OR PREPARED TO DO ANYTHING TO END YOUR LIFE?: NO

## 2024-07-14 ASSESSMENT — ACTIVITIES OF DAILY LIVING (ADL)
ADLS_ACUITY_SCORE: 33
ADLS_ACUITY_SCORE: 33

## 2024-07-14 NOTE — ED PROVIDER NOTES
Emergency Department Note      History of Present Illness     Chief Complaint   No chief complaint on file.    HPI   Ramona Hill is a 43 year old female  who presents for bright red blood in stool. Ramona's  reports that 3 to 4 weeks ago she had bright red blood present in her stool. Today, the blood in the stool appeared for the 2nd time since onset. He says that she has been constipated since this time and has done 1 BM per week since onset. Ramona endorses pain when she tries to pass a BM. She denies history of hemorrhoids or anal fissure but notes that she has had acne around her rectal area in the past. She denies vomiting, vomiting blood, abdominal pain. She denies recent changes to food or activity. She denies a history of intestinal issues or a colonoscopy. She denies history of medications. Of note, her  mentions that she does not drink a lot of water and that she eats cleanly.  Her  notes that she occasionally uses MiraLAX but not consistently.  She does not tend to eat much fiber.    Independent Historian    acting as Yoruba interpretor as detailed above.  Formal  declined.    Review of External Notes   Primary care notes reviewed from 2023 when the patient was evaluated for constipation.    Past Medical History     Medical History and Problem List   Past Medical History:   Diagnosis Date    Enlarged thyroid     NO ACTIVE PROBLEMS     Postpartum anxiety 2016    Status post  2017       Medications   hydrocortisone, Perianal, (ANUSOL-HC) 2.5 % cream  levonorgestrel (MIRENA) 20 MCG/24HR IUD        Surgical History   Past Surgical History:   Procedure Laterality Date     SECTION N/A 2016    Procedure:  SECTION;  Surgeon: Tammie Colon MD;  Location: [facility]     SECTION N/A 2017    Procedure:  SECTION;  Repeat  Section ;  Surgeon: Zo Fox MD;  Location: [facility]      SECTION N/A 2019    Procedure:  SECTION;  Surgeon: Sofía Aguilar MD;  Location:  L[facility]       Physical Exam     Patient Vitals for the past 24 hrs:   BP Temp Temp src Pulse Resp SpO2 Weight   24 1405 115/55 98  F (36.7  C) Temporal 87 16 100 % 55 kg (121 lb 4.1 oz)     Physical Exam  Constitutional:       General: She is not in acute distress.     Appearance: Normal appearance. She is not diaphoretic.   HENT:      Head: Atraumatic.      Mouth/Throat:      Mouth: Mucous membranes are moist.   Eyes:      General: No scleral icterus.     Conjunctiva/sclera: Conjunctivae normal.   Cardiovascular:      Rate and Rhythm: Normal rate and regular rhythm.      Heart sounds: Normal heart sounds.   Pulmonary:      Effort: No respiratory distress.      Breath sounds: Normal breath sounds.   Abdominal:      General: Abdomen is flat. There is no distension.      Tenderness: There is no abdominal tenderness.   Genitourinary:     Comments: There is an anal fissure in the posterior midline.  No internal/external hemorrhoid.  No active bleeding.  Musculoskeletal:      Cervical back: Neck supple.   Skin:     General: Skin is warm.      Capillary Refill: Capillary refill takes less than 2 seconds.      Findings: No rash.   Neurological:      Mental Status: She is alert.   Psychiatric:         Mood and Affect: Mood normal.         Behavior: Behavior normal.           Diagnostics     Lab Results   Labs Ordered and Resulted from Time of ED Arrival to Time of ED Departure   BASIC METABOLIC PANEL - Abnormal       Result Value    Sodium 137      Potassium 3.7      Chloride 103      Carbon Dioxide (CO2) 22      Anion Gap 12      Urea Nitrogen 5.9 (*)     Creatinine 0.61      GFR Estimate >90      Calcium 9.1      Glucose 97     CBC WITH PLATELETS AND DIFFERENTIAL - Abnormal    WBC Count 8.1      RBC Count 3.71 (*)     Hemoglobin 11.4 (*)     Hematocrit 34.4 (*)     MCV 93      MCH 30.7      MCHC 33.1      RDW  12.1      Platelet Count 180      % Neutrophils 69      % Lymphocytes 24      % Monocytes 6      % Eosinophils 1      % Basophils 1      % Immature Granulocytes 0      NRBCs per 100 WBC 0      Absolute Neutrophils 5.6      Absolute Lymphocytes 1.9      Absolute Monocytes 0.5      Absolute Eosinophils 0.1      Absolute Basophils 0.0      Absolute Immature Granulocytes 0.0      Absolute NRBCs 0.0       ED Course      Procedures   Procedures   Anoscope exam  Indication: Rectal bleeding  Glenda Seth RN chaperoned the exam  The patient was placed in the left lateral, position.  A lubricated anoscope was inserted.  No internal/external hemorrhoids.  There is a nonbleeding anal fissure in the posterior midline.  The patient tolerated this well without complication.    Medical Decision Making / Diagnosis     CHEMA Hill is a 43 year old female who presents to the ED with rectal bleeding.  She has an anal fissure likely related to constipation.  She was prescribed Anusol.  The patient has MiraLAX at home.  She was advised to use it daily for 1 month at least.  She will follow-up in the colorectal clinic.  We discussed return precautions.    Disposition   The patient was discharged.     Diagnosis     ICD-10-CM    1. Anal fissure  K60.2       2. Rectal bleeding  K62.5            Discharge Medications   New Prescriptions    HYDROCORTISONE, PERIANAL, (ANUSOL-HC) 2.5 % CREAM    Use twice daily for 2 weeks.         Scribe Disclosure:  Margaret POON, am serving as a scribe at 3:04 PM on 7/14/2024 to document services personally performed by Bran Toledo, based on my observations and the provider's statements to me.        Bran Toledo MD  07/14/24 [id number]

## 2024-07-14 NOTE — DISCHARGE INSTRUCTIONS
Use MiraLAX daily for constipation.    Return to the ER for any new or worsening symptoms.    Please follow-up with the colorectal clinic to ensure resolution of the fissure.

## 2024-07-14 NOTE — ED TRIAGE NOTES
Blood in stool once today. Has been experiencing constipation recently. Alert and ambulatory.

## 2024-08-23 ENCOUNTER — TELEPHONE (OUTPATIENT)
Dept: FAMILY MEDICINE | Facility: CLINIC | Age: 44
End: 2024-08-23
Payer: COMMERCIAL

## 2024-08-23 NOTE — TELEPHONE ENCOUNTER
Patient Quality Outreach    Patient is due for the following:   Breast Cancer Screening - Mammogram  Cervical Cancer Screening - PAP Needed    Next Steps:   Schedule a office visit for pap    Type of outreach:    Sent Jounce message.      Questions for provider review:    None           Ni Hicks

## 2024-10-10 ENCOUNTER — IMMUNIZATION (OUTPATIENT)
Dept: FAMILY MEDICINE | Facility: CLINIC | Age: 44
End: 2024-10-10
Payer: COMMERCIAL

## 2024-10-10 DIAGNOSIS — Z23 NEED FOR PROPHYLACTIC VACCINATION AND INOCULATION AGAINST INFLUENZA: Primary | ICD-10-CM

## 2024-10-10 PROCEDURE — 90656 IIV3 VACC NO PRSV 0.5 ML IM: CPT

## 2024-10-10 PROCEDURE — 99207 PR NO CHARGE NURSE ONLY: CPT

## 2024-10-10 PROCEDURE — 90471 IMMUNIZATION ADMIN: CPT

## 2024-10-22 ENCOUNTER — OFFICE VISIT (OUTPATIENT)
Dept: URGENT CARE | Facility: URGENT CARE | Age: 44
End: 2024-10-22
Payer: COMMERCIAL

## 2024-10-22 VITALS
DIASTOLIC BLOOD PRESSURE: 70 MMHG | RESPIRATION RATE: 18 BRPM | HEART RATE: 81 BPM | TEMPERATURE: 97.6 F | SYSTOLIC BLOOD PRESSURE: 107 MMHG | WEIGHT: 121 LBS | BODY MASS INDEX: 23.91 KG/M2 | OXYGEN SATURATION: 100 %

## 2024-10-22 DIAGNOSIS — R00.0 TACHYCARDIA: Primary | ICD-10-CM

## 2024-10-22 LAB — HGB BLD-MCNC: 12 G/DL (ref 11.7–15.7)

## 2024-10-22 PROCEDURE — 85018 HEMOGLOBIN: CPT | Performed by: PHYSICIAN ASSISTANT

## 2024-10-22 PROCEDURE — 36415 COLL VENOUS BLD VENIPUNCTURE: CPT | Performed by: PHYSICIAN ASSISTANT

## 2024-10-22 PROCEDURE — 80048 BASIC METABOLIC PNL TOTAL CA: CPT | Performed by: PHYSICIAN ASSISTANT

## 2024-10-22 PROCEDURE — 99213 OFFICE O/P EST LOW 20 MIN: CPT | Performed by: PHYSICIAN ASSISTANT

## 2024-10-22 PROCEDURE — 84443 ASSAY THYROID STIM HORMONE: CPT | Performed by: PHYSICIAN ASSISTANT

## 2024-10-22 PROCEDURE — 83735 ASSAY OF MAGNESIUM: CPT | Performed by: PHYSICIAN ASSISTANT

## 2024-10-22 PROCEDURE — 93000 ELECTROCARDIOGRAM COMPLETE: CPT | Performed by: PHYSICIAN ASSISTANT

## 2024-10-23 LAB
ANION GAP SERPL CALCULATED.3IONS-SCNC: 12 MMOL/L (ref 7–15)
BUN SERPL-MCNC: 12.3 MG/DL (ref 6–20)
CALCIUM SERPL-MCNC: 9.3 MG/DL (ref 8.8–10.4)
CHLORIDE SERPL-SCNC: 105 MMOL/L (ref 98–107)
CREAT SERPL-MCNC: 0.58 MG/DL (ref 0.51–0.95)
EGFRCR SERPLBLD CKD-EPI 2021: >90 ML/MIN/1.73M2
GLUCOSE SERPL-MCNC: 96 MG/DL (ref 70–99)
HCO3 SERPL-SCNC: 21 MMOL/L (ref 22–29)
MAGNESIUM SERPL-MCNC: 2 MG/DL (ref 1.7–2.3)
POTASSIUM SERPL-SCNC: 4.3 MMOL/L (ref 3.4–5.3)
SODIUM SERPL-SCNC: 138 MMOL/L (ref 135–145)
TSH SERPL DL<=0.005 MIU/L-ACNC: 1.77 UIU/ML (ref 0.3–4.2)

## 2024-10-23 NOTE — PROGRESS NOTES
ASSESSMENT/PLAN:    (R00.0) Tachycardia  (primary encounter diagnosis)    MDM: Two brief episodes of tachycardia and shortness of breath--each lasting 5 minutes per episode--last night. No tachycardia or shortness of breath today. No chest pain or other acute cardiac symptoms. Patient reports one prior episode of rapid heartbeats 1-2 years ago. No other history of arrhythmia. No primary family history of cardiac arrhythmia or sudden cardiac death.  Review of epic shows her hemoglobin was slightly low at 11.4 7/14/24,  so I repeated a hemoglobin today (which was normal at 12). TSH with reflex to screen for thyroid disorder. Basic metabolic panel and magnesium to screen for electrolyte disorder etiology.  Criteria for urgent and emergent follow-up were reviewed. I have advised she see primary care provider for follow-up next week. Please see discharge summary below:       Plan: Hemoglobin, Basic metabolic panel  (Ca, Cl,         CO2, Creat, Gluc, K, Na, BUN), TSH with free T4        reflex, Magnesium            AVS/Plan-    October 22, 2024 Urgent Care Plan:     - Decrease your coffee/caffeine intake  -Make sure you are drinking eight (8oz) glasses of water every day   -Make  an appointment  to see your primary care provider next week for a follow-up visit and to review the tests results from today's urgent care visit.         Go To The Emergency Room If You Have Any Of The Below:       You passed out (lost consciousness).     You have severe difficulty breathing.     You have symptoms of a heart attack. These may include:  Chest pain or pressure, or a strange feeling in your chest.  Sweating.  Shortness of breath.  Nausea or vomiting.  Pain, pressure, or a strange feeling in your back, neck, jaw, or upper belly or in one or both shoulders or arms.  Lightheadedness or sudden weakness.  A fast or irregular heartbeat that lasts 30 or more minutes            This progress note has been dictated, with use of voice  recognition software. Any grammatical, typographical, or context errors are unintentional and inherent to use of voice recognition software.  --------------      Chief Complaint   Patient presents with    Urgent Care     Patient state that she was seen a couple of year ago for the same thing. Last night she suddenly have rapid heart beat, its off and on          SUBJECTIVE:      Ramona Hill is a 44 year old female who presents to  today for evaluation of racing heartbeats that occurred last night.  She reports two episodes of rapid/racing heartbeats--each lasting an estimated 5 minutes-o-n both occasions, patient was laying down. Heart returned to regular rate without any intervention.     She felt short of breath only for the duration of rapid heart beats (5 minutes on each occasion) . No rapid heartbeats or shortness of breath today. No associated chest pain or diaphoresis.     Patient states she had similar symptoms 1-2 years ago. She has not seen her primary care provider or a cardiologist to discuss these symptoms.     Caffeine intake: 3 cups of coffee per day. Alcohol (1/2 beverage several days per week) . No use of prescription or over the counter medications.        Cardiac Risk Factors:  CAD:       No                                                    Hypertension:   No                                Hyperlipidemia:  No                    Diabetes:      No                                     Tobacco use:  No                             Gender:   Female                                         Age:  44                                                          Familial Hx of CAD:  No    Family Hx of SCD: No              PE/DVT Risk Factors:   Hx of PE/DVT:      No                                        Hx of clotting disorder:  No                    Hx of cancer:     No                                                     Tobacco use:    No                                                      Hormone  therapy: No                                  Pregnancy: No                                                      Prolonged immobilization: No                Recent surgery:     No                                      Recent travel:   No                                                 Familial Hx of PE/DVT:  No                      Review Of Systems  Consitutional: No acute fever or chills   Skin: Denies any rash   Eyes: Denies any visual changes   Ears/Nose/Throat:  Denies any ST, nasal congestion or ear pain   Respiratory: Denies any hx of spontaneous pneumothorax No shortness of breath (other than what was noted above), dyspnea on exertion, cough, or hemoptysis  Cardiovascular: Positive as per above. No chest pain. No left jaw or left UE pain. No lower extremity edema, syncope or near-syncope. No sudden decrease in longstanding exertional tolerance.    Gastrointestinal: No acute abdominal pain   Neurologic: Denies any HA, neck stiffness or lethargy.   Psychiatric: Denies any anxiety or panic attack history. Denies any increased stress   Hematologic/Lymphatic/Immunologic: Denies any personal hx of DVT/PE or blood dyscrasia   Endocrine: Denies any DM         Past Medical History:   Diagnosis Date    Enlarged thyroid     NO ACTIVE PROBLEMS     Postpartum anxiety 2016    Status post  2017          Past Surgical History:   Procedure Laterality Date     SECTION N/A 2016    Procedure:  SECTION;  Surgeon: Tammie Colon MD;  Location: [facility]     SECTION N/A 2017    Procedure:  SECTION;  Repeat  Section ;  Surgeon: Zo Fox MD;  Location: [facility]     SECTION N/A 2019    Procedure:  SECTION;  Surgeon: Sofía Aguilar MD;  Location: [facility]       Social History     Socioeconomic History    Marital status: Single     Spouse name: Not on file    Number of children: Not on file    Years of education: Not on file     Highest education level: Not on file   Occupational History    Not on file   Tobacco Use    Smoking status: Never    Smokeless tobacco: Never   Vaping Use    Vaping status: Never Used   Substance and Sexual Activity    Alcohol use: No     Alcohol/week: 0.0 standard drinks of alcohol    Drug use: No    Sexual activity: Yes     Partners: Male     Birth control/protection: I.U.D.   Other Topics Concern    Not on file   Social History Narrative    Not on file     Social Determinants of Health     Financial Resource Strain: Not on file   Food Insecurity: Not on file   Transportation Needs: Not on file   Physical Activity: Not on file   Stress: Not on file   Social Connections: Not on file   Interpersonal Safety: Not on file   Housing Stability: Not on file       Family History   Problem Relation Age of Onset    Esophagitis Mother     Ovarian Cancer No family hx of     Breast Cancer No family hx of     Colon Cancer No family hx of           Current Outpatient Medications   Medication Sig Dispense Refill    levonorgestrel (MIRENA) 20 MCG/24HR IUD 1 each by Intrauterine route once Due for removal: 7/31/2025      Multiple Vitamin (MULTIVITAMIN ADULT PO) Take by mouth.      hydrocortisone, Perianal, (ANUSOL-HC) 2.5 % cream Use twice daily for 2 weeks. (Patient not taking: Reported on 10/22/2024) 30 g 0     No current facility-administered medications for this visit.       No Known Allergies    OBJECTIVE:  /70   Pulse 81   Temp 97.6  F (36.4  C)   Resp 18   Wt 54.9 kg (121 lb)   SpO2 100%   BMI 23.91 kg/m      General appearance: alert and no apparent distress  Skin color is uniform in color and without rash.  HEENT:   Conjunctiva not injected.  Sclera clear.  NECK: Trachea is midline. No JVD. No thyromegaly   CARDIAC:NORMAL - regular rate and rhythm without murmur.  RESP: No increased work of breathing. Normal - CTA without rales, rhonchi, or wheezing.Good breath sounds into all listening areas today   ABDOMEN:  Abdomen soft, non-tender. BS normal. No masses, organomegaly  EXTREMITIES:  No asymmetry. Lower legs are equally symmetrical bilaterally. No redness, swelling, heat or pain of lower extremities.   NEURO: Alert and oriented.  Normal speech and mentation.  CN II/XII grossly intact.  Gait within normal limits.           Today's ECG:  NSR at 81 bpm. No ectopy. No acute ischemic changes on my impression

## 2024-10-23 NOTE — PATIENT INSTRUCTIONS
October 22, 2024 Urgent Care Plan:     - Decrease your coffee/caffeine intake  -Make sure you are drinking eight (8oz) glasses of water every day   -Make  an appointment  to see your primary care provider next week for a follow-up visit and to review the tests results from today's urgent care visit.         Go To The Emergency Room If You Have Any Of The Below:       You passed out (lost consciousness).     You have severe difficulty breathing.     You have symptoms of a heart attack. These may include:  Chest pain or pressure, or a strange feeling in your chest.  Sweating.  Shortness of breath.  Nausea or vomiting.  Pain, pressure, or a strange feeling in your back, neck, jaw, or upper belly or in one or both shoulders or arms.  Lightheadedness or sudden weakness.  A fast or irregular heartbeat that lasts 30 or more minutes

## 2025-03-09 ENCOUNTER — HEALTH MAINTENANCE LETTER (OUTPATIENT)
Age: 45
End: 2025-03-09

## 2025-06-16 ENCOUNTER — OFFICE VISIT (OUTPATIENT)
Dept: FAMILY MEDICINE | Facility: CLINIC | Age: 45
End: 2025-06-16
Payer: COMMERCIAL

## 2025-06-16 ENCOUNTER — TELEPHONE (OUTPATIENT)
Dept: FAMILY MEDICINE | Facility: CLINIC | Age: 45
End: 2025-06-16

## 2025-06-16 VITALS
WEIGHT: 114.2 LBS | HEIGHT: 60 IN | SYSTOLIC BLOOD PRESSURE: 108 MMHG | OXYGEN SATURATION: 100 % | TEMPERATURE: 97.8 F | RESPIRATION RATE: 16 BRPM | HEART RATE: 90 BPM | BODY MASS INDEX: 22.42 KG/M2 | DIASTOLIC BLOOD PRESSURE: 71 MMHG

## 2025-06-16 DIAGNOSIS — R01.1 UNDIAGNOSED CARDIAC MURMURS: ICD-10-CM

## 2025-06-16 DIAGNOSIS — Z97.5 IUD (INTRAUTERINE DEVICE) IN PLACE: ICD-10-CM

## 2025-06-16 DIAGNOSIS — Z00.00 ROUTINE GENERAL MEDICAL EXAMINATION AT A HEALTH CARE FACILITY: Primary | ICD-10-CM

## 2025-06-16 DIAGNOSIS — Z12.31 VISIT FOR SCREENING MAMMOGRAM: ICD-10-CM

## 2025-06-16 DIAGNOSIS — Z12.4 CERVICAL CANCER SCREENING: ICD-10-CM

## 2025-06-16 PROCEDURE — G0010 ADMIN HEPATITIS B VACCINE: HCPCS | Performed by: PHYSICIAN ASSISTANT

## 2025-06-16 PROCEDURE — 3078F DIAST BP <80 MM HG: CPT | Performed by: PHYSICIAN ASSISTANT

## 2025-06-16 PROCEDURE — 87624 HPV HI-RISK TYP POOLED RSLT: CPT | Performed by: PHYSICIAN ASSISTANT

## 2025-06-16 PROCEDURE — 99213 OFFICE O/P EST LOW 20 MIN: CPT | Mod: 25 | Performed by: PHYSICIAN ASSISTANT

## 2025-06-16 PROCEDURE — 3074F SYST BP LT 130 MM HG: CPT | Performed by: PHYSICIAN ASSISTANT

## 2025-06-16 PROCEDURE — 99396 PREV VISIT EST AGE 40-64: CPT | Mod: 25 | Performed by: PHYSICIAN ASSISTANT

## 2025-06-16 PROCEDURE — 1126F AMNT PAIN NOTED NONE PRSNT: CPT | Performed by: PHYSICIAN ASSISTANT

## 2025-06-16 PROCEDURE — G2211 COMPLEX E/M VISIT ADD ON: HCPCS | Performed by: PHYSICIAN ASSISTANT

## 2025-06-16 PROCEDURE — 90746 HEPB VACCINE 3 DOSE ADULT IM: CPT | Performed by: PHYSICIAN ASSISTANT

## 2025-06-16 SDOH — HEALTH STABILITY: PHYSICAL HEALTH: ON AVERAGE, HOW MANY MINUTES DO YOU ENGAGE IN EXERCISE AT THIS LEVEL?: 0 MIN

## 2025-06-16 SDOH — HEALTH STABILITY: PHYSICAL HEALTH: ON AVERAGE, HOW MANY DAYS PER WEEK DO YOU ENGAGE IN MODERATE TO STRENUOUS EXERCISE (LIKE A BRISK WALK)?: 0 DAYS

## 2025-06-16 ASSESSMENT — PAIN SCALES - GENERAL: PAINLEVEL_OUTOF10: NO PAIN (0)

## 2025-06-16 ASSESSMENT — SOCIAL DETERMINANTS OF HEALTH (SDOH): HOW OFTEN DO YOU GET TOGETHER WITH FRIENDS OR RELATIVES?: ONCE A WEEK

## 2025-06-16 NOTE — PATIENT INSTRUCTIONS
Patient Education   Preventive Care Advice   This is general advice given by our system to help you stay healthy. However, your care team may have specific advice just for you. Please talk to your care team about your preventive care needs.  Nutrition  Eat 5 or more servings of fruits and vegetables each day.  Try wheat bread, brown rice and whole grain pasta (instead of white bread, rice, and pasta).  Get enough calcium and vitamin D. Check the label on foods and aim for 100% of the RDA (recommended daily allowance).  Lifestyle  Exercise at least 150 minutes each week  (30 minutes a day, 5 days a week).  Do muscle strengthening activities 2 days a week. These help control your weight and prevent disease.  No smoking.  Wear sunscreen to prevent skin cancer.  Have a dental exam and cleaning every 6 months.  Yearly exams  See your health care team every year to talk about:  Any changes in your health.  Any medicines your care team has prescribed.  Preventive care, family planning, and ways to prevent chronic diseases.  Shots (vaccines)   HPV shots (up to age 26), if you've never had them before.  Hepatitis B shots (up to age 59), if you've never had them before.  COVID-19 shot: Get this shot when it's due.  Flu shot: Get a flu shot every year.  Tetanus shot: Get a tetanus shot every 10 years.  Pneumococcal, hepatitis A, and RSV shots: Ask your care team if you need these based on your risk.  Shingles shot (for age 50 and up)  General health tests  Diabetes screening:  Starting at age 35, Get screened for diabetes at least every 3 years.  If you are younger than age 35, ask your care team if you should be screened for diabetes.  Cholesterol test: At age 39, start having a cholesterol test every 5 years, or more often if advised.  Bone density scan (DEXA): At age 50, ask your care team if you should have this scan for osteoporosis (brittle bones).  Hepatitis C: Get tested at least once in your life.  STIs (sexually  transmitted infections)  Before age 24: Ask your care team if you should be screened for STIs.  After age 24: Get screened for STIs if you're at risk. You are at risk for STIs (including HIV) if:  You are sexually active with more than one person.  You don't use condoms every time.  You or a partner was diagnosed with a sexually transmitted infection.  If you are at risk for HIV, ask about PrEP medicine to prevent HIV.  Get tested for HIV at least once in your life, whether you are at risk for HIV or not.  Cancer screening tests  Cervical cancer screening: If you have a cervix, begin getting regular cervical cancer screening tests starting at age 21.  Breast cancer scan (mammogram): If you've ever had breasts, begin having regular mammograms starting at age 40. This is a scan to check for breast cancer.  Colon cancer screening: It is important to start screening for colon cancer at age 45.  Have a colonoscopy test every 10 years (or more often if you're at risk) Or, ask your provider about stool tests like a FIT test every year or Cologuard test every 3 years.  To learn more about your testing options, visit:   .  For help making a decision, visit:   https://bit.ly/pv02291.  Prostate cancer screening test: If you have a prostate, ask your care team if a prostate cancer screening test (PSA) at age 55 is right for you.  Lung cancer screening: If you are a current or former smoker ages 50 to 80, ask your care team if ongoing lung cancer screenings are right for you.  For informational purposes only. Not to replace the advice of your health care provider. Copyright   2023 [employer]. All rights reserved. Clinically reviewed by the [facility] Transitions Program. [employer] [id number] - REV 01/24.

## 2025-06-16 NOTE — PROGRESS NOTES
Preventive Care Visit  [facility] VERNA Meadows PA-C, Family Medicine  Jun 16, 2025      Assessment & Plan     Routine general medical examination at a health care facility  Normal lipids and diabetes screening 1/2024    Cervical cancer screening  - HPV and Gynecologic Cytology Panel - Recommended Age 30 - 65 Years    Visit for screening mammogram  - MA Screening Bilateral w/ Agustin; Future    Undiagnosed cardiac murmurs  Systolic murmur heard on exam today. Murmur was noted at OB/GYN visit in 2018 during pregnancy but no other mention of heart murmur in her chart. She feels well, denies chest pain, shortness of breath, palpitations, fatigue. Will start with echo and follow-up per results.  - Echocardiogram Complete; Future    IUD (intrauterine device) in place  IUD in place (Mirena IUD, placed in 2019)  Doing well  No periods with IUD  IUD strings not visualized  Had pelvic US 8/12/2022 showing IUD in normal position    Counseling  Appropriate preventive services were addressed with this patient via screening, questionnaire, or discussion as appropriate for fall prevention, nutrition, physical activity, Tobacco-use cessation, social engagement, weight loss and cognition.  Checklist reviewing preventive services available has been given to the patient.  Reviewed patient's diet, addressing concerns and/or questions.       Follow-up    Follow-up Visit   Expected date:  Jun 16, 2026 (Approximate)      Follow Up Appointment Details:     Follow-up with whom?: PCP    Follow-Up for what?: Adult Preventive    How?: In Person               The longitudinal plan of care for the diagnosis(es)/condition(s) as documented were addressed during this visit. Due to the added complexity in care, I will continue to support Ramona in the subsequent management and with ongoing continuity of care.    Subjective   Ramona is a 44 year old, presenting for the following:  Physical           HPI       IUD in place  (Mirena IUD, placed in 2019)  Doing well  No periods with IUD  IUD strings not visualized  Had pelvic US 8/12/2022 showing IUD in normal position    Advance Care Planning    Discussed advance care planning with patient; however, patient declined at this time.        6/16/2025   General Health   How would you rate your overall physical health? Excellent   Feel stress (tense, anxious, or unable to sleep) Not at all         6/16/2025   Nutrition   Three or more servings of calcium each day? Yes   Diet: Regular (no restrictions)   How many servings of fruit and vegetables per day? (!) 0-1   How many sweetened beverages each day? 0-1         6/16/2025   Exercise   Days per week of moderate/strenous exercise 0 days   Average minutes spent exercising at this level 0 min   (!) EXERCISE CONCERN      6/16/2025   Social Factors   Frequency of gathering with friends or relatives Once a week   Worry food won't last until get money to buy more No   Food not last or not have enough money for food? No   Do you have housing? (Housing is defined as stable permanent housing and does not include staying outside in a car, in a tent, in an abandoned building, in an overnight shelter, or couch-surfing.) Yes   Are you worried about losing your housing? No   Lack of transportation? No   Unable to get utilities (heat,electricity)? No         6/16/2025   Dental   Dentist two times every year? Yes         Today's PHQ-2 Score:       6/16/2025     3:25 PM   PHQ-2 ( 1999 [employer])   Q1: Little interest or pleasure in doing things 0    Q2: Feeling down, depressed or hopeless 0    PHQ-2 Score 0    Q1: Little interest or pleasure in doing things Not at all   Q2: Feeling down, depressed or hopeless Not at all   PHQ-2 Score 0       Proxy-reported           6/16/2025   Substance Use   Alcohol more than 3/day or more than 7/wk No   Do you use any other substances recreationally? No     Social History     Tobacco Use    Smoking status: Never    Smokeless  tobacco: Never   Vaping Use    Vaping status: Never Used   Substance Use Topics    Alcohol use: No     Alcohol/week: 0.0 standard drinks of alcohol    Drug use: No                  2025   STI Screening   New sexual partner(s) since last STI/HIV test? No     History of abnormal Pap smear:         Latest Ref Rng & Units 7/10/2019    10:01 AM 7/10/2019    10:00 AM 2016    12:00 AM   PAP / HPV   PAP (Historical)  NIL   NIL    HPV 16 DNA NEG^Negative  Negative     HPV 18 DNA NEG^Negative  Negative     Other HR HPV NEG^Negative  Negative       ASCVD Risk   The 10-year ASCVD risk score (Parminder NORRIS, et al., 2019) is: 0.3%    Values used to calculate the score:      Age: 44 years      Sex: Female      Is Non- : Yes      Diabetic: No      Tobacco smoker: No      Systolic Blood Pressure: 108 mmHg      Is BP treated: No      HDL Cholesterol: 53 mg/dL      Total Cholesterol: 153 mg/dL        2025   Contraception/Family Planning   Questions about contraception or family planning No        Reviewed and updated as needed this visit by Provider   Tobacco  Allergies  Meds  Problems  Med Hx  Surg Hx  Fam Hx            Past Medical History:   Diagnosis Date    Enlarged thyroid 2018    NO ACTIVE PROBLEMS     Postpartum anxiety 2016    Status post  2017     Past Surgical History:   Procedure Laterality Date     SECTION N/A 2016    Procedure:  SECTION;  Surgeon: Tammie Colon MD;  Location: [facility]     SECTION N/A 2017    Procedure:  SECTION;  Repeat  Section ;  Surgeon: Zo Fox MD;  Location: [facility]     SECTION N/A 2019    Procedure:  SECTION;  Surgeon: Sofía Aguilar MD;  Location: [facility]     Lab work is in process  Labs reviewed in EPIC  BP Readings from Last 3 Encounters:   25 108/71   10/22/24 107/70   24 115/55    Wt Readings from Last 3 Encounters:    25 51.8 kg (114 lb 3.2 oz)   10/22/24 54.9 kg (121 lb)   24 55 kg (121 lb 4.1 oz)                  Patient Active Problem List   Diagnosis    Language barrier    IUD (intrauterine device) in place    Undiagnosed cardiac murmurs     Past Surgical History:   Procedure Laterality Date     SECTION N/A 2016    Procedure:  SECTION;  Surgeon: Tammie Colon MD;  Location: [facility]     SECTION N/A 2017    Procedure:  SECTION;  Repeat  Section ;  Surgeon: Zo Fox MD;  [facility]: [facility]     SECTION N/A 2019    Procedure:  SECTION;  Surgeon: Sofía Aguilar MD;  Location: [facility]       Social History     Tobacco Use    Smoking status: Never    Smokeless tobacco: Never   Substance Use Topics    Alcohol use: No     Alcohol/week: 0.0 standard drinks of alcohol     Family History   Problem Relation Age of Onset    Esophagitis Mother     Ovarian Cancer No family hx of     Breast Cancer No family hx of     Colon Cancer No family hx of          Current Outpatient Medications   Medication Sig Dispense Refill    hydrocortisone, Perianal, (ANUSOL-HC) 2.5 % cream Use twice daily for 2 weeks. 30 g 0    levonorgestrel (MIRENA) 20 MCG/24HR IUD 1 each by Intrauterine route once Due for removal: 2025      Multiple Vitamin (MULTIVITAMIN ADULT PO) Take by mouth.       No Known Allergies  Recent Labs   Lab Test 10/22/24  1947 24  1424 24  0939 21  1538 03/15/19  1421 18  1253 18  1626   LDL  --   --  87  --   --   --   --    HDL  --   --  53  --   --   --   --    TRIG  --   --  67  --   --   --   --    ALT  --   --   --  21  --   --   --    CR 0.58 0.61  --  0.61  --   --  0.67   GFRESTIMATED >90 >90  --  >90  --   --  >90   GFRESTBLACK  --   --   --  >90  --   --  >90   POTASSIUM 4.3 3.7  --  3.5  --   --  3.9   TSH 1.77  --   --   --  1.80   < > 1.88    < > = values in this interval not displayed.            "  Objective    Exam  /71   Pulse 90   Temp 97.8  F (36.6  C) (Skin)   Resp 16   Ht 1.515 m (4' 11.65\")   Wt 51.8 kg (114 lb 3.2 oz)   LMP  (LMP Unknown)   SpO2 100%   BMI 22.57 kg/m     Estimated body mass index is 22.57 kg/m  as calculated from the following:    Height as of this encounter: 1.515 m (4' 11.65\").    Weight as of this encounter: 51.8 kg (114 lb 3.2 oz).    Physical Exam  GENERAL: Healthy, alert and no distress.  HEAD: Normocephalic, atraumatic.   EYES: PERRL. Normal conjunctivae, sclera.   ENT: Normal EAC and TMs bilaterally. Normal oropharynx.   NECK: Supple. No lymphadenopathy appreciated. Trachea midline. Thyroid not enlarged, not TTP.  RESP: Lungs clear to auscultation - no rales, rhonchi or wheezes  CV: Regular rate and rhythm, normal S1 S2, 2/6 systolic murmur, click, rub or gallop. No peripheral swelling noted.   ABDOMEN: Soft, no TTP x4 quadrants. No hepatomegaly or masses appreciated. BS normactive.   (female): normal female external genitalia, normal urethral meatus, normal vaginal mucosa, and normal cervix without masses or discharge; IUD strings not visualized  MSK: No gross musculoskeletal defects noted.  EXT: Warm and well perfused. DP pulses 2+ bilaterally.  NEURO: CNII-XII grossly intact. No focal deficits.  PSYCH: Groomed, dressed appropriately for weather. Normal mood with consistent affect.         Signed Electronically by: Mai Meadows PA-C    "

## 2025-06-16 NOTE — TELEPHONE ENCOUNTER
Patient and spouse call. Patient had questions about referrals placed today. Advised that screening mammogram and echo were ordered. Explained that echo was ordered for a murmur. Patient and spouse verbalized understanding.    Mya Real RN on 6/16/2025 at 5:22 PM

## 2025-06-17 ENCOUNTER — PATIENT OUTREACH (OUTPATIENT)
Dept: CARE COORDINATION | Facility: CLINIC | Age: 45
End: 2025-06-17
Payer: COMMERCIAL

## 2025-06-17 LAB
HPV HR 12 DNA CVX QL NAA+PROBE: NEGATIVE
HPV16 DNA CVX QL NAA+PROBE: NEGATIVE
HPV18 DNA CVX QL NAA+PROBE: NEGATIVE
HUMAN PAPILLOMA VIRUS FINAL DIAGNOSIS: NORMAL

## 2025-06-18 ENCOUNTER — RESULTS FOLLOW-UP (OUTPATIENT)
Dept: OBGYN | Facility: CLINIC | Age: 45
End: 2025-06-18
Payer: COMMERCIAL

## 2025-06-18 DIAGNOSIS — R01.1 UNDIAGNOSED CARDIAC MURMURS: Primary | ICD-10-CM

## 2025-06-18 NOTE — LETTER
July 3, 2025      Ramona Hsutevernell Liz  [address]  [place] [address]        Dear ,    We are writing to inform you of your test results.    Your test results fall within the expected range(s) or remain unchanged from previous results.  Please continue with current treatment plan.    Resulted Orders   HPV and Gynecologic Cytology Panel - Recommended Age 30 - 65 Years   Result Value Ref Range    Human Papilloma Virus 16 DNA Negative Negative    Human Papilloma Virus 18 DNA Negative Negative    Human Papilloma Virus Other Negative Negative    FINAL DIAGNOSIS       This patient's sample is negative for high risk HPV DNA.          METHODOLOGY: The [employer] system uses automated extraction, simultaneous amplification of HPV (E6/E7 oncogenes) and beta-globin, followed by real time detection of fluorescent labeled HPV and beta globin using specific oligonucleotide probes. The test specifically identifies types HPV 16 DNA and HPV 18 DNA while concurrently detecting the rest of the high risk types (31, 33, 35, 39, 45, 51, 52, 56, 58, 59, 66 or 68).     COMMENTS: This test is not intended for use as a screening device for woman under age 30 with normal cervical cytology. Results should be correlated with cytologic and histologic findings. Close clinical follow up is recommended.      Please see the separate Gynecologic Cytology (Pap) report from the same collection date.     Gynecologic Cytology (PAP)   Result Value Ref Range    Interpretation        Negative for Intraepithelial Lesion or Malignancy (NILM)    Comment         Papanicolaou Test Limitations:  Cervical cytology is a screening test with limited sensitivity, and regular screening is critical for cancer prevention.  Pap tests are primarily effective for the diagnosis/prevention of squamous cell carcinoma, not adenocarcinoma or other cancers.        Specimen Adequacy       Satisfactory for evaluation, endocervical/transformation zone component absent     Clinical Information       none  IUD      Previous Abnormal?       No      Performing Labs       The technical component of this testing was completed at [facility].    Stain controls for all stains resulted within this report have been reviewed and show appropriate reactivity.      Associated HPV Report       Please see the associated HPV High Risk Types DNA Cervical report for Specimen [id number] from the same collection date.     Echocardiogram Complete   Result Value Ref Range    LVEF  60-65%     Narrative    [id number]  [id number]  [id number]  [id number]^TRUDI^REANNA^ELFEGO     [facility]  Echocardiography Laboratory  [address] [place] [address]     Name: JOVAN ARITA  MRN: [id number]  : 1980  Study Date: 2025 03:49 PM  Age: 44 yrs  Gender: Female  Patient Location: [facility]  Reason For Study: Undiagnosed cardiac murmurs  Ordering Physician: REANNA BRUSH  Referring Physician: REANNA BRUSH  Performed By: Jennifer Loyd RDCS     BSA: 1.5 m2  Height: 60 in  Weight: 114 lb  HR: 97  ______________________________________________________________________________  Procedure  Echocardiogram with two-dimensional, color and spectral Doppler.  ______________________________________________________________________________  Interpretation Summary     Doppler interrogation does not demonstrate signficant stenosis or  insufficieincy involvng cardiac valves  The left ventricle is normal in structure, function and size.  The visual ejection fraction is 60-65%.  The right ventricle is normal in structure, function and size.  Right ventricle systolic pressure estimate normal  Sinus rhythm was noted.     This is an unremarable TTE. No old studies for comnparison  ______________________________________________________________________________  Left Ventricle  The left ventricle is normal in structure, function and size. There  is normal  left ventricular wall thickness. The visual ejection fraction is 60-65%. Left  ventricular diastolic function is normal. No regional wall motion  abnormalities noted. There is no thrombus seen in the left ventricle.     Right Ventricle  The right ventricle is normal in structure, function and size. There is no  mass or thrombus in the right ventricle.     Atria  Normal left atrial size. Right atrial size is normal. There is no atrial shunt  seen. The left atrial appendage is not well visualized.     Mitral Valve  The mitral valve leaflets appear normal. There is no evidence of stenosis,  fluttering, or prolapse. There is no mitral regurgitation noted. There is no  mitral valve stenosis.     Tricuspid Valve  Normal tricuspid valve. The right ventricular systolic pressure is  approximated at 22.1 mmHg plus the right atrial pressure. Right ventricle  systolic pressure estimate normal. There is mild (1+) tricuspid regurgitation.  There is no tricuspid stenosis.     Aortic Valve  The aortic valve is trileaflet. No aortic regurgitation is present. No aortic  stenosis is present.     Pulmonic Valve  Normal pulmonic valve. There is no pulmonic valvular regurgitation. There is  no pulmonic valvular stenosis.     Vessels  The aortic root is normal size. Normal size ascending aorta. The inferior vena  cava is normal. The pulmonary artery is normal size.     Pericardium  The pericardium appears normal. There is no pleural effusion.     Rhythm  Sinus rhythm was noted.  ______________________________________________________________________________  MMode/2D Measurements & Calculations     IVSd: 0.56 cm  LVIDd: 3.7 cm  LVIDs: 2.1 cm  LVPWd: 0.70 cm  IVC diam: 1.7 cm  FS: 41.9 %  LV mass(C)d: 59.0 grams  LV mass(C)dI: 40.1 grams/m2  Ao root diam: 2.4 cm  asc Aorta Diam: 2.2 cm  Ao root diam index Ht(cm/m): 1.6  Ao root diam index BSA (cm/m2): 1.6  Asc Ao diam index BSA (cm/m2): 1.5  Asc Ao diam index Ht(cm/m): 1.5  LA  Volume (BP): 34.0 ml     LA Volume Index (BP): 23.1 ml/m2  RV Base: 3.9 cm  RWT: 0.38  TAPSE: 2.6 cm     Doppler Measurements & Calculations  MV E max silver: 97.5 cm/sec  MV A max silver: 67.1 cm/sec  MV E/A: 1.5  MV max P.1 mmHg  MV mean P.8 mmHg  MV V2 VTI: 28.4 cm  MV dec time: 0.15 sec  PA acc time: 0.14 sec  TR max silver: 235.1 cm/sec  TR max P.1 mmHg  E/E' av.2  Lateral E/e': 6.4  Medial E/e': 6.1  RV S Silver: 19.4 cm/sec     ______________________________________________________________________________  Report approved by: Dr. Vincent Lomax on 2025 04:37 PM             If you have any questions or concerns, please call the clinic at the number listed above.       Sincerely,      Mai Meadows PA-C    Electronically signed

## 2025-06-29 ENCOUNTER — HOSPITAL ENCOUNTER (EMERGENCY)
Facility: CLINIC | Age: 45
Discharge: HOME | End: 2025-06-29
Attending: EMERGENCY MEDICINE | Admitting: EMERGENCY MEDICINE
Payer: COMMERCIAL

## 2025-06-29 ENCOUNTER — APPOINTMENT (OUTPATIENT)
Dept: GENERAL RADIOLOGY | Facility: CLINIC | Age: 45
End: 2025-06-29
Attending: EMERGENCY MEDICINE
Payer: COMMERCIAL

## 2025-06-29 VITALS
BODY MASS INDEX: 22.65 KG/M2 | WEIGHT: 114.64 LBS | HEART RATE: 96 BPM | SYSTOLIC BLOOD PRESSURE: 125 MMHG | OXYGEN SATURATION: 98 % | TEMPERATURE: 98 F | DIASTOLIC BLOOD PRESSURE: 80 MMHG | RESPIRATION RATE: 16 BRPM

## 2025-06-29 DIAGNOSIS — R07.9 NONSPECIFIC CHEST PAIN: ICD-10-CM

## 2025-06-29 DIAGNOSIS — R20.2 PARESTHESIAS: ICD-10-CM

## 2025-06-29 LAB
ALBUMIN SERPL BCG-MCNC: 4.5 G/DL (ref 3.5–5.2)
ALP SERPL-CCNC: 68 U/L (ref 40–150)
ALT SERPL W P-5'-P-CCNC: 16 U/L (ref 0–50)
ANION GAP SERPL CALCULATED.3IONS-SCNC: 13 MMOL/L (ref 7–15)
AST SERPL W P-5'-P-CCNC: 22 U/L (ref 0–45)
BASOPHILS # BLD AUTO: 0 10E3/UL (ref 0–0.2)
BASOPHILS NFR BLD AUTO: 0 %
BILIRUB SERPL-MCNC: 0.3 MG/DL
BUN SERPL-MCNC: 5.7 MG/DL (ref 6–20)
CALCIUM SERPL-MCNC: 9.1 MG/DL (ref 8.8–10.4)
CHLORIDE SERPL-SCNC: 106 MMOL/L (ref 98–107)
CREAT SERPL-MCNC: 0.57 MG/DL (ref 0.51–0.95)
EGFRCR SERPLBLD CKD-EPI 2021: >90 ML/MIN/1.73M2
EOSINOPHIL # BLD AUTO: 0.1 10E3/UL (ref 0–0.7)
EOSINOPHIL NFR BLD AUTO: 1 %
ERYTHROCYTE [DISTWIDTH] IN BLOOD BY AUTOMATED COUNT: 12.5 % (ref 10–15)
GLUCOSE SERPL-MCNC: 102 MG/DL (ref 70–99)
HCG SERPL QL: NEGATIVE
HCO3 SERPL-SCNC: 20 MMOL/L (ref 22–29)
HCT VFR BLD AUTO: 35.9 % (ref 35–47)
HGB BLD-MCNC: 12.3 G/DL (ref 11.7–15.7)
IMM GRANULOCYTES # BLD: 0 10E3/UL
IMM GRANULOCYTES NFR BLD: 0 %
LYMPHOCYTES # BLD AUTO: 2.4 10E3/UL (ref 0.8–5.3)
LYMPHOCYTES NFR BLD AUTO: 26 %
MCH RBC QN AUTO: 31.2 PG (ref 26.5–33)
MCHC RBC AUTO-ENTMCNC: 34.3 G/DL (ref 31.5–36.5)
MCV RBC AUTO: 91 FL (ref 78–100)
MONOCYTES # BLD AUTO: 0.4 10E3/UL (ref 0–1.3)
MONOCYTES NFR BLD AUTO: 4 %
NEUTROPHILS # BLD AUTO: 6.3 10E3/UL (ref 1.6–8.3)
NEUTROPHILS NFR BLD AUTO: 69 %
NRBC # BLD AUTO: 0 10E3/UL
NRBC BLD AUTO-RTO: 0 /100
PLATELET # BLD AUTO: 180 10E3/UL (ref 150–450)
POTASSIUM SERPL-SCNC: 3.7 MMOL/L (ref 3.4–5.3)
PROT SERPL-MCNC: 7.7 G/DL (ref 6.4–8.3)
RBC # BLD AUTO: 3.94 10E6/UL (ref 3.8–5.2)
SODIUM SERPL-SCNC: 139 MMOL/L (ref 135–145)
TROPONIN T SERPL HS-MCNC: <6 NG/L
WBC # BLD AUTO: 9.2 10E3/UL (ref 4–11)

## 2025-06-29 PROCEDURE — 85025 COMPLETE CBC W/AUTO DIFF WBC: CPT | Performed by: EMERGENCY MEDICINE

## 2025-06-29 PROCEDURE — 36415 COLL VENOUS BLD VENIPUNCTURE: CPT | Performed by: EMERGENCY MEDICINE

## 2025-06-29 PROCEDURE — 84703 CHORIONIC GONADOTROPIN ASSAY: CPT | Performed by: EMERGENCY MEDICINE

## 2025-06-29 PROCEDURE — 82040 ASSAY OF SERUM ALBUMIN: CPT | Performed by: EMERGENCY MEDICINE

## 2025-06-29 PROCEDURE — 84484 ASSAY OF TROPONIN QUANT: CPT | Performed by: EMERGENCY MEDICINE

## 2025-06-29 PROCEDURE — 99285 EMERGENCY DEPT VISIT HI MDM: CPT | Mod: 25 | Performed by: EMERGENCY MEDICINE

## 2025-06-29 PROCEDURE — 93005 ELECTROCARDIOGRAM TRACING: CPT | Performed by: EMERGENCY MEDICINE

## 2025-06-29 PROCEDURE — 71046 X-RAY EXAM CHEST 2 VIEWS: CPT

## 2025-06-29 ASSESSMENT — COLUMBIA-SUICIDE SEVERITY RATING SCALE - C-SSRS
1. IN THE PAST MONTH, HAVE YOU WISHED YOU WERE DEAD OR WISHED YOU COULD GO TO SLEEP AND NOT WAKE UP?: NO
6. HAVE YOU EVER DONE ANYTHING, STARTED TO DO ANYTHING, OR PREPARED TO DO ANYTHING TO END YOUR LIFE?: NO
2. HAVE YOU ACTUALLY HAD ANY THOUGHTS OF KILLING YOURSELF IN THE PAST MONTH?: NO

## 2025-06-29 ASSESSMENT — ACTIVITIES OF DAILY LIVING (ADL)
ADLS_ACUITY_SCORE: 42
ADLS_ACUITY_SCORE: 42

## 2025-06-29 NOTE — ED PROVIDER NOTES
Emergency Department Note      History of Present Illness     Chief Complaint   Chest Pain and Numbness    HPI   The patient's  provides Italian interpretation.   Ramona Hill is a 44 year old female with past medical history significant for cardiac murmurs who presents via car from home accompanied by  and children with chief complaint of chest pain and numbness. The patient was previously seen on 06/16/25 by her PCP for a heart murmur and had an echocardiogram scheduled for 07/01/25. At midnight, she began experiencing right sided chest discomfort and numbness in her hands bilaterally. She describes the chest discomfort as a light pressure. Her  states the patient had an irregular heart rate ranging from . She also notes some shortness of breath that has resolved at present.This is the first time she has experienced symptoms like these. She denies lower extremity swelling. No nausea, vomiting, or diaphoresis. She does not smoke.     Independent Historian    as detailed above.    Review of External Notes   I reviewed the primary care note from 06/16/25 the patient had a murmur and an echo was ordered.     Past Medical History     Medical History and Problem List   Cardiac murmurs  Postpartum anxiety  Enlarged thyroid    Medications   Levonorgestrel    Surgical History   G-section x3    Physical Exam     Patient Vitals for the past 24 hrs:   BP Temp Pulse Resp SpO2 Weight   06/29/25 1124 125/80 -- -- -- -- --   06/29/25 1123 -- 98  F (36.7  C) 96 16 98 % 52 kg (114 lb 10.2 oz)     Physical Exam  Vitals and nursing note reviewed.   Constitutional:       General: She is not in acute distress.     Appearance: She is not ill-appearing.   HENT:      Head: Normocephalic and atraumatic.      Right Ear: External ear normal.      Left Ear: External ear normal.      Nose: Nose normal.      Mouth/Throat:      Mouth: Mucous membranes are moist.   Eyes:      Extraocular Movements:  Extraocular movements intact.      Conjunctiva/sclera: Conjunctivae normal.   Cardiovascular:      Rate and Rhythm: Normal rate and regular rhythm.      Pulses: Normal pulses.      Heart sounds: Murmur heard.   Pulmonary:      Effort: Pulmonary effort is normal. No respiratory distress.      Breath sounds: Normal breath sounds. No wheezing, rhonchi or rales.   Abdominal:      General: Abdomen is flat. Bowel sounds are normal. There is no distension.      Palpations: Abdomen is soft.      Tenderness: There is no abdominal tenderness. There is no guarding or rebound.   Musculoskeletal:         General: No swelling, deformity or signs of injury.      Cervical back: Normal range of motion and neck supple.   Skin:     General: Skin is warm and dry.      Findings: No rash.   Neurological:      Mental Status: She is alert and oriented to person, place, and time.   Psychiatric:         Behavior: Behavior normal.           Diagnostics     Lab Results   Labs Ordered and Resulted from Time of ED Arrival to Time of ED Departure   COMPREHENSIVE METABOLIC PANEL - Abnormal       Result Value    Sodium 139      Potassium 3.7      Carbon Dioxide (CO2) 20 (*)     Anion Gap 13      Urea Nitrogen 5.7 (*)     Creatinine 0.57      GFR Estimate >90      Calcium 9.1      Chloride 106      Glucose 102 (*)     Alkaline Phosphatase 68      AST 22      ALT 16      Protein Total 7.7      Albumin 4.5      Bilirubin Total 0.3     TROPONIN T, HIGH SENSITIVITY - Normal    Troponin T, High Sensitivity <6     HCG QUALITATIVE PREGNANCY - Normal    hCG Serum Qualitative Negative     CBC WITH PLATELETS AND DIFFERENTIAL    WBC Count 9.2      RBC Count 3.94      Hemoglobin 12.3      Hematocrit 35.9      MCV 91      MCH 31.2      MCHC 34.3      RDW 12.5      Platelet Count 180      % Neutrophils 69      % Lymphocytes 26      % Monocytes 4      % Eosinophils 1      % Basophils 0      % Immature Granulocytes 0      NRBCs per 100 WBC 0      Absolute  Neutrophils 6.3      Absolute Lymphocytes 2.4      Absolute Monocytes 0.4      Absolute Eosinophils 0.1      Absolute Basophils 0.0      Absolute Immature Granulocytes 0.0      Absolute NRBCs 0.0         Imaging   XR Chest 2 Views   Final Result   IMPRESSION: Negative chest.          EKG   ECG results from 06/29/25   EKG 12-lead, tracing only     Value    Systolic Blood Pressure     Diastolic Blood Pressure     Ventricular Rate 89    Atrial Rate 89    SC Interval 144    QRS Duration 80        QTc 440    P Axis 85    R AXIS 79    T Axis 70    Interpretation ECG      Sinus rhythm  Right atrial enlargement  Nonspecific ST abnormality  Abnormal ECG  No significant change as compared to prior, dated 10/22/24  I reviewed at 1142          Independent Interpretation   CXR: No pneumothorax, infiltrate, or mediastinal widening.    ED Course      Medications Administered   Medications - No data to display    Procedures   Procedures     Discussion of Management   None    ED Course   ED Course as of 06/29/25 1530   Sun Jun 29, 2025   1140 I obtained history and examined the patient as noted above.    1316 I rechecked and updated the patient. The patient is feeling better. We discussed plan for discharge and the patient is comfortable with this.       Additional Documentation  None    Medical Decision Making / Diagnosis     CMS Diagnoses: None    MIPS   None               MDM   Ramona Hill is a 44 year old female who presents with an episode of chest heaviness with some tingling in both of her upper extremities.  Symptoms have mostly resolved with only some slight chest pressure in the ED.  It is unclear what the etiology of this is.  She was recently told that she has a heart murmur and is scheduled for an echo and she does seem to be somewhat anxious about this.  I have a fairly low suspicion for ACS given that she is low risk, has a nonischemic EKG, and does not seem to have any other signs or symptoms of angina.   Her troponin is undetectable.  Also have low suspicion for PE given that she is low risk and PERC negative.  Doubt dissection given that pain is mild and nonradiating, she has intact pulses in all of her extremities, and she has no mediastinal widening on her chest x-ray.  I updated her on results and they are comfortable being discharged at this time.  She does have a echo scheduled for Tuesday and I recommended close follow-up with her primary care provider following this.  We discussed return precautions.    Disposition   The patient was discharged.     Diagnosis     ICD-10-CM    1. Nonspecific chest pain  R07.9       2. Paresthesias  R20.2            Discharge Medications   Discharge Medication List as of 6/29/2025  1:21 PM        Scribe Disclosure:  I, Toi Maldonado, am serving as a scribe at 11:43 AM on 6/29/2025 to document services personally performed by Darren Carney MD based on my observations and the provider's statements to me.        Darren Carney MD  06/29/25 [id number]

## 2025-06-29 NOTE — ED TRIAGE NOTES
Right sided chest pain and intermittent arm numbness for the past several months, if not years per patient report.

## 2025-06-30 LAB
ATRIAL RATE - MUSE: 89 BPM
DIASTOLIC BLOOD PRESSURE - MUSE: NORMAL MMHG
INTERPRETATION ECG - MUSE: NORMAL
P AXIS - MUSE: 85 DEGREES
PR INTERVAL - MUSE: 144 MS
QRS DURATION - MUSE: 80 MS
QT - MUSE: 362 MS
QTC - MUSE: 440 MS
R AXIS - MUSE: 79 DEGREES
SYSTOLIC BLOOD PRESSURE - MUSE: NORMAL MMHG
T AXIS - MUSE: 70 DEGREES
VENTRICULAR RATE- MUSE: 89 BPM

## 2025-07-01 ENCOUNTER — HOSPITAL ENCOUNTER (OUTPATIENT)
Dept: CARDIOLOGY | Facility: CLINIC | Age: 45
Discharge: HOME | End: 2025-07-01
Attending: PHYSICIAN ASSISTANT
Payer: COMMERCIAL

## 2025-07-01 DIAGNOSIS — R01.1 UNDIAGNOSED CARDIAC MURMURS: ICD-10-CM

## 2025-07-01 LAB — LVEF ECHO: NORMAL

## 2025-07-01 PROCEDURE — 93306 TTE W/DOPPLER COMPLETE: CPT

## 2025-07-02 NOTE — TELEPHONE ENCOUNTER
Please see message below regarding work flow and call pt with normal results. Transfer phone call to RN's if pt has any questions you cannot answer.    Zenaida Moss RN   [facility]

## 2025-07-02 NOTE — TELEPHONE ENCOUNTER
Per review, labs are normal which can be relayed by TC's.    Routing to primary care. Please call pt and relay lab results, if pt has more questions that you cannot answer, please transfer phone call to nurses and we can help then.     Zenaida Moss RN   [facility]

## 2025-08-25 ENCOUNTER — OFFICE VISIT (OUTPATIENT)
Dept: FAMILY MEDICINE | Facility: CLINIC | Age: 45
End: 2025-08-25
Payer: COMMERCIAL

## 2025-08-25 VITALS
RESPIRATION RATE: 16 BRPM | HEIGHT: 60 IN | OXYGEN SATURATION: 100 % | DIASTOLIC BLOOD PRESSURE: 80 MMHG | WEIGHT: 114 LBS | SYSTOLIC BLOOD PRESSURE: 126 MMHG | TEMPERATURE: 97.4 F | HEART RATE: 86 BPM | BODY MASS INDEX: 22.38 KG/M2

## 2025-08-25 DIAGNOSIS — R00.2 PALPITATIONS: Primary | ICD-10-CM

## 2025-08-25 DIAGNOSIS — F41.9 ANXIETY: ICD-10-CM

## 2025-08-25 LAB
ANION GAP SERPL CALCULATED.3IONS-SCNC: 14 MMOL/L (ref 7–15)
BUN SERPL-MCNC: 11.2 MG/DL (ref 6–20)
CALCIUM SERPL-MCNC: 9.2 MG/DL (ref 8.8–10.4)
CHLORIDE SERPL-SCNC: 103 MMOL/L (ref 98–107)
CREAT SERPL-MCNC: 0.55 MG/DL (ref 0.51–0.95)
EGFRCR SERPLBLD CKD-EPI 2021: >90 ML/MIN/1.73M2
ERYTHROCYTE [DISTWIDTH] IN BLOOD BY AUTOMATED COUNT: 12.2 % (ref 10–15)
GLUCOSE SERPL-MCNC: 100 MG/DL (ref 70–99)
HCO3 SERPL-SCNC: 21 MMOL/L (ref 22–29)
HCT VFR BLD AUTO: 36.6 % (ref 35–47)
HGB BLD-MCNC: 12.4 G/DL (ref 11.7–15.7)
MCH RBC QN AUTO: 31.6 PG (ref 26.5–33)
MCHC RBC AUTO-ENTMCNC: 33.9 G/DL (ref 31.5–36.5)
MCV RBC AUTO: 93.4 FL (ref 78–100)
PLATELET # BLD AUTO: 162 10E3/UL (ref 150–450)
POTASSIUM SERPL-SCNC: 4.1 MMOL/L (ref 3.4–5.3)
RBC # BLD AUTO: 3.92 10E6/UL (ref 3.8–5.2)
SODIUM SERPL-SCNC: 138 MMOL/L (ref 135–145)
TSH SERPL DL<=0.005 MIU/L-ACNC: 1.86 UIU/ML (ref 0.3–4.2)
WBC # BLD AUTO: 6.43 10E3/UL (ref 4–11)

## 2025-08-25 PROCEDURE — 1126F AMNT PAIN NOTED NONE PRSNT: CPT | Performed by: PHYSICIAN ASSISTANT

## 2025-08-25 PROCEDURE — G2211 COMPLEX E/M VISIT ADD ON: HCPCS | Performed by: PHYSICIAN ASSISTANT

## 2025-08-25 PROCEDURE — 3074F SYST BP LT 130 MM HG: CPT | Performed by: PHYSICIAN ASSISTANT

## 2025-08-25 PROCEDURE — 84443 ASSAY THYROID STIM HORMONE: CPT | Performed by: PHYSICIAN ASSISTANT

## 2025-08-25 PROCEDURE — 99215 OFFICE O/P EST HI 40 MIN: CPT | Performed by: PHYSICIAN ASSISTANT

## 2025-08-25 PROCEDURE — 85027 COMPLETE CBC AUTOMATED: CPT | Performed by: PHYSICIAN ASSISTANT

## 2025-08-25 PROCEDURE — 3079F DIAST BP 80-89 MM HG: CPT | Performed by: PHYSICIAN ASSISTANT

## 2025-08-25 PROCEDURE — 36415 COLL VENOUS BLD VENIPUNCTURE: CPT | Performed by: PHYSICIAN ASSISTANT

## 2025-08-25 PROCEDURE — 80048 BASIC METABOLIC PNL TOTAL CA: CPT | Performed by: PHYSICIAN ASSISTANT

## 2025-08-25 RX ORDER — HYDROXYZINE HYDROCHLORIDE 25 MG/1
12.5-25 TABLET, FILM COATED ORAL EVERY 6 HOURS PRN
Qty: 30 TABLET | Refills: 0 | Status: SHIPPED | OUTPATIENT
Start: 2025-08-25

## 2025-08-25 ASSESSMENT — PAIN SCALES - GENERAL: PAINLEVEL_OUTOF10: NO PAIN (0)

## 2025-09-02 ENCOUNTER — OFFICE VISIT (OUTPATIENT)
Dept: URGENT CARE | Facility: URGENT CARE | Age: 45
End: 2025-09-02
Payer: COMMERCIAL

## 2025-09-02 VITALS
HEART RATE: 78 BPM | SYSTOLIC BLOOD PRESSURE: 106 MMHG | OXYGEN SATURATION: 100 % | WEIGHT: 117.7 LBS | DIASTOLIC BLOOD PRESSURE: 71 MMHG | BODY MASS INDEX: 23.37 KG/M2 | TEMPERATURE: 98 F | RESPIRATION RATE: 19 BRPM

## 2025-09-02 DIAGNOSIS — Z76.89 SLEEP CONCERN: ICD-10-CM

## 2025-09-02 DIAGNOSIS — F41.9 ANXIETY: ICD-10-CM

## 2025-09-02 DIAGNOSIS — L30.9 DERMATITIS: Primary | ICD-10-CM

## 2025-09-02 DIAGNOSIS — R00.2 PALPITATIONS: ICD-10-CM

## 2025-09-02 PROCEDURE — 3074F SYST BP LT 130 MM HG: CPT | Performed by: PHYSICIAN ASSISTANT

## 2025-09-02 PROCEDURE — 99213 OFFICE O/P EST LOW 20 MIN: CPT | Performed by: PHYSICIAN ASSISTANT

## 2025-09-02 PROCEDURE — 3078F DIAST BP <80 MM HG: CPT | Performed by: PHYSICIAN ASSISTANT

## 2025-09-02 RX ORDER — HYDROCORTISONE 25 MG/G
CREAM TOPICAL 2 TIMES DAILY
Qty: 30 G | Refills: 0 | Status: SHIPPED | OUTPATIENT
Start: 2025-09-02

## 2025-09-04 ENCOUNTER — HOSPITAL ENCOUNTER (EMERGENCY)
Facility: CLINIC | Age: 45
Discharge: HOME | End: 2025-09-04
Attending: EMERGENCY MEDICINE
Payer: COMMERCIAL

## 2025-09-04 ENCOUNTER — NURSE TRIAGE (OUTPATIENT)
Dept: FAMILY MEDICINE | Facility: CLINIC | Age: 45
End: 2025-09-04
Payer: COMMERCIAL

## 2025-09-04 VITALS
OXYGEN SATURATION: 100 % | SYSTOLIC BLOOD PRESSURE: 105 MMHG | HEART RATE: 78 BPM | TEMPERATURE: 97.5 F | RESPIRATION RATE: 18 BRPM | DIASTOLIC BLOOD PRESSURE: 76 MMHG

## 2025-09-04 DIAGNOSIS — R07.9 CHEST PAIN, UNSPECIFIED TYPE: ICD-10-CM

## 2025-09-04 DIAGNOSIS — R00.2 PALPITATIONS: Primary | ICD-10-CM

## 2025-09-04 LAB
ANION GAP SERPL CALCULATED.3IONS-SCNC: 10 MMOL/L (ref 7–15)
ATRIAL RATE - MUSE: 85 BPM
BUN SERPL-MCNC: 9.7 MG/DL (ref 6–20)
CALCIUM SERPL-MCNC: 9.2 MG/DL (ref 8.8–10.4)
CHLORIDE SERPL-SCNC: 103 MMOL/L (ref 98–107)
CREAT SERPL-MCNC: 0.62 MG/DL (ref 0.51–0.95)
DIASTOLIC BLOOD PRESSURE - MUSE: NORMAL MMHG
EGFRCR SERPLBLD CKD-EPI 2021: >90 ML/MIN/1.73M2
ERYTHROCYTE [DISTWIDTH] IN BLOOD BY AUTOMATED COUNT: 12.3 % (ref 10–15)
GLUCOSE SERPL-MCNC: 102 MG/DL (ref 70–99)
HCO3 SERPL-SCNC: 23 MMOL/L (ref 22–29)
HCT VFR BLD AUTO: 36.8 % (ref 35–47)
HGB BLD-MCNC: 12.3 G/DL (ref 11.7–15.7)
HOLD SPECIMEN: NORMAL
HOLD SPECIMEN: NORMAL
INTERPRETATION ECG - MUSE: NORMAL
MCH RBC QN AUTO: 31.2 PG (ref 26.5–33)
MCHC RBC AUTO-ENTMCNC: 33.4 G/DL (ref 31.5–36.5)
MCV RBC AUTO: 93.4 FL (ref 78–100)
P AXIS - MUSE: 77 DEGREES
PLATELET # BLD AUTO: 204 10E3/UL (ref 150–450)
POTASSIUM SERPL-SCNC: 4.3 MMOL/L (ref 3.4–5.3)
PR INTERVAL - MUSE: 144 MS
QRS DURATION - MUSE: 80 MS
QT - MUSE: 372 MS
QTC - MUSE: 442 MS
R AXIS - MUSE: 71 DEGREES
RBC # BLD AUTO: 3.94 10E6/UL (ref 3.8–5.2)
SODIUM SERPL-SCNC: 136 MMOL/L (ref 135–145)
SYSTOLIC BLOOD PRESSURE - MUSE: NORMAL MMHG
T AXIS - MUSE: 66 DEGREES
TROPONIN T SERPL HS-MCNC: <6 NG/L
VENTRICULAR RATE- MUSE: 85 BPM
WBC # BLD AUTO: 9.42 10E3/UL (ref 4–11)

## 2025-09-04 PROCEDURE — 82374 ASSAY BLOOD CARBON DIOXIDE: CPT | Performed by: EMERGENCY MEDICINE

## 2025-09-04 PROCEDURE — 85048 AUTOMATED LEUKOCYTE COUNT: CPT | Performed by: EMERGENCY MEDICINE

## 2025-09-04 PROCEDURE — 84484 ASSAY OF TROPONIN QUANT: CPT | Performed by: EMERGENCY MEDICINE

## 2025-09-04 PROCEDURE — 36415 COLL VENOUS BLD VENIPUNCTURE: CPT | Performed by: EMERGENCY MEDICINE

## 2025-09-04 ASSESSMENT — COLUMBIA-SUICIDE SEVERITY RATING SCALE - C-SSRS
6. HAVE YOU EVER DONE ANYTHING, STARTED TO DO ANYTHING, OR PREPARED TO DO ANYTHING TO END YOUR LIFE?: NO
1. IN THE PAST MONTH, HAVE YOU WISHED YOU WERE DEAD OR WISHED YOU COULD GO TO SLEEP AND NOT WAKE UP?: NO
2. HAVE YOU ACTUALLY HAD ANY THOUGHTS OF KILLING YOURSELF IN THE PAST MONTH?: NO

## 2025-09-04 ASSESSMENT — ACTIVITIES OF DAILY LIVING (ADL)
ADLS_ACUITY_SCORE: 42

## (undated) DEVICE — SOL NACL 0.9% IRRIG 1000ML BOTTLE 07138-09

## (undated) DEVICE — SUCTION CANISTER MEDIVAC LINER 1500ML W/LID 65651-515

## (undated) DEVICE — BNDG ABDOMINAL BINDER 10X26-50" 08140145

## (undated) DEVICE — DRSG ADAPTIC 3X8" 6113

## (undated) DEVICE — ESU GROUND PAD UNIVERSAL W/O CORD

## (undated) DEVICE — BARRIER SEPRAFILM 5X6" SINGLE SHEET 4301-02

## (undated) DEVICE — STRAP KNEE/BODY 31143004

## (undated) DEVICE — STOCKING SLEEVE COMPRESSION CALF LG

## (undated) DEVICE — PREP CHLORAPREP 26ML TINTED ORANGE  260815

## (undated) DEVICE — SU VICRYL 0 CT-1 36" J346H

## (undated) DEVICE — CATH TRAY FOLEY 16FR SILICONE 907416

## (undated) DEVICE — SOL WATER IRRIG 1000ML BOTTLE 07139-09

## (undated) DEVICE — GLOVE ESTEEM POWDER FREE SMT 7.0  2D72PT70

## (undated) DEVICE — GLOVE SENSICARE PI POWDER FREE 7.5 LATEX FREE MSG9075

## (undated) DEVICE — SU MONOCRYL 0 CT-1 36" Y346H

## (undated) DEVICE — PACK C-SECTION LF PL15OTA83B

## (undated) DEVICE — STPL SKIN 35W 059037

## (undated) DEVICE — BASIN SET MAJOR

## (undated) RX ORDER — BUPIVACAINE HYDROCHLORIDE 7.5 MG/ML
INJECTION, SOLUTION INTRASPINAL
Status: DISPENSED
Start: 2019-05-20

## (undated) RX ORDER — PHENYLEPHRINE HCL IN 0.9% NACL 1 MG/10 ML
SYRINGE (ML) INTRAVENOUS
Status: DISPENSED
Start: 2019-05-21

## (undated) RX ORDER — FENTANYL CITRATE 50 UG/ML
INJECTION, SOLUTION INTRAMUSCULAR; INTRAVENOUS
Status: DISPENSED
Start: 2019-05-20

## (undated) RX ORDER — KETOROLAC TROMETHAMINE 30 MG/ML
INJECTION, SOLUTION INTRAMUSCULAR; INTRAVENOUS
Status: DISPENSED
Start: 2019-05-20

## (undated) RX ORDER — EPHEDRINE SULFATE 50 MG/ML
INJECTION, SOLUTION INTRAMUSCULAR; INTRAVENOUS; SUBCUTANEOUS
Status: DISPENSED
Start: 2019-05-20

## (undated) RX ORDER — MORPHINE SULFATE 1 MG/ML
INJECTION, SOLUTION EPIDURAL; INTRATHECAL; INTRAVENOUS
Status: DISPENSED
Start: 2019-05-20

## (undated) RX ORDER — PHENYLEPHRINE HCL IN 0.9% NACL 1 MG/10 ML
SYRINGE (ML) INTRAVENOUS
Status: DISPENSED
Start: 2019-05-20

## (undated) RX ORDER — MORPHINE SULFATE 1 MG/ML
INJECTION, SOLUTION EPIDURAL; INTRATHECAL; INTRAVENOUS
Status: DISPENSED
Start: 2017-12-22